# Patient Record
Sex: FEMALE | Race: BLACK OR AFRICAN AMERICAN | NOT HISPANIC OR LATINO | Employment: FULL TIME | URBAN - METROPOLITAN AREA
[De-identification: names, ages, dates, MRNs, and addresses within clinical notes are randomized per-mention and may not be internally consistent; named-entity substitution may affect disease eponyms.]

---

## 2018-07-24 ENCOUNTER — OFFICE VISIT (OUTPATIENT)
Dept: UROLOGY | Facility: CLINIC | Age: 58
End: 2018-07-24
Payer: COMMERCIAL

## 2018-07-24 VITALS — HEIGHT: 65 IN | RESPIRATION RATE: 19 BRPM | OXYGEN SATURATION: 99 % | WEIGHT: 281 LBS | BODY MASS INDEX: 46.82 KG/M2

## 2018-07-24 DIAGNOSIS — N23 RENAL COLIC: Primary | ICD-10-CM

## 2018-07-24 DIAGNOSIS — N20.0 NEPHROLITHIASIS: ICD-10-CM

## 2018-07-24 DIAGNOSIS — D17.79 MYELOLIPOMA OF RIGHT ADRENAL GLAND: ICD-10-CM

## 2018-07-24 DIAGNOSIS — D17.79 MYELOLIPOMA OF LEFT ADRENAL GLAND: ICD-10-CM

## 2018-07-24 PROCEDURE — 3008F BODY MASS INDEX DOCD: CPT | Mod: CPTII,S$GLB,, | Performed by: UROLOGY

## 2018-07-24 PROCEDURE — 99214 OFFICE O/P EST MOD 30 MIN: CPT | Mod: S$GLB,,, | Performed by: UROLOGY

## 2018-07-24 PROCEDURE — 99999 PR PBB SHADOW E&M-EST. PATIENT-LVL III: CPT | Mod: PBBFAC,,, | Performed by: UROLOGY

## 2018-07-24 RX ORDER — VALACYCLOVIR HYDROCHLORIDE 1 G/1
1000 TABLET, FILM COATED ORAL DAILY
COMMUNITY
Start: 2018-07-24 | End: 2024-03-26

## 2018-07-24 RX ORDER — METFORMIN HYDROCHLORIDE 500 MG/1
1000 TABLET, EXTENDED RELEASE ORAL DAILY
COMMUNITY
Start: 2018-07-10 | End: 2020-10-19 | Stop reason: SDUPTHER

## 2018-07-24 NOTE — PATIENT INSTRUCTIONS
Obtain XRAY.  If stone is found and amenable to ESWL, then Plan ESWL.   If KUB is normal than plan CT Scan

## 2018-07-24 NOTE — PROGRESS NOTES
Subjective:       Patient ID: Aminta Schreiber is a 58 y.o. female.    Chief Complaint: Follow-up    57 yo AAF with history of Nephrolithiasis. Developed Right flank pain 2 mo ago that was severe and it has been waxing and waning since that time. Presents for evaluation. Pt also found to have bilateral myelolipoma in her adrenal glands. Last imaged in 12/17.      Flank Pain   This is a new problem. The current episode started more than 1 month ago. The problem occurs daily. The problem has been waxing and waning since onset. The pain is present in the costovertebral angle. The quality of the pain is described as aching. The pain does not radiate. The pain is at a severity of 3/10. The pain is mild. The pain is worse during the day. The symptoms are aggravated by bending, twisting, standing and position. Pertinent negatives include no abdominal pain, bladder incontinence, bowel incontinence, chest pain, dysuria, fever, headaches, leg pain, numbness, paresis, paresthesias, pelvic pain, perianal numbness, tingling, weakness or weight loss. Risk factors include renal stones, lack of exercise, obesity and sedentary lifestyle. She has tried nothing for the symptoms. The treatment provided significant relief.     Review of Systems   Constitutional: Negative for activity change, appetite change, chills, fatigue, fever and weight loss.   HENT: Negative for congestion, ear pain, hearing loss, nosebleeds, sinus pressure, sore throat and trouble swallowing.    Eyes: Negative for pain and visual disturbance.   Respiratory: Negative for apnea, cough and shortness of breath.    Cardiovascular: Negative for chest pain and leg swelling.   Gastrointestinal: Negative for abdominal distention, abdominal pain, anal bleeding, blood in stool, bowel incontinence, constipation, diarrhea, nausea, rectal pain and vomiting.   Endocrine: Negative for cold intolerance, heat intolerance, polydipsia, polyphagia and polyuria.   Genitourinary: Positive for  flank pain. Negative for bladder incontinence, decreased urine volume, difficulty urinating, dyspareunia, dysuria, enuresis, frequency, genital sores, hematuria, menstrual problem, pelvic pain, urgency, vaginal bleeding, vaginal discharge and vaginal pain.   Musculoskeletal: Negative for arthralgias and back pain.   Skin: Negative for color change, pallor and rash.   Allergic/Immunologic: Negative for environmental allergies, food allergies and immunocompromised state.   Neurological: Negative for dizziness, tingling, speech difficulty, weakness, numbness, headaches and paresthesias.   Hematological: Negative for adenopathy. Does not bruise/bleed easily.   Psychiatric/Behavioral: Negative.        Objective:      Physical Exam   Nursing note and vitals reviewed.  Constitutional: She is oriented to person, place, and time. She appears well-developed and well-nourished.   HENT:   Head: Normocephalic.   Nose: Nose normal.   Mouth/Throat: Oropharynx is clear and moist.   Eyes: Conjunctivae and EOM are normal. Pupils are equal, round, and reactive to light.   Neck: Normal range of motion. Neck supple.   Cardiovascular: Normal rate, regular rhythm, normal heart sounds and intact distal pulses.    Pulmonary/Chest: Effort normal and breath sounds normal.   Abdominal: Soft. Bowel sounds are normal.   Genitourinary: Vagina normal.   Genitourinary Comments: Right CVAT   Musculoskeletal: Normal range of motion.   Neurological: She is alert and oriented to person, place, and time. She has normal reflexes.   Skin: Skin is warm and dry.     Psychiatric: She has a normal mood and affect. Her behavior is normal. Judgment and thought content normal.       Assessment:       1. Renal colic    2. Myelolipoma of left adrenal gland    3. Myelolipoma of right adrenal gland    4. Nephrolithiasis        Plan:       Patient Instructions   Obtain XRAY.  If stone is found and amenable to ESWL, then Plan ESWL.   If KUB is normal than plan CT  Scan

## 2018-07-26 ENCOUNTER — PATIENT MESSAGE (OUTPATIENT)
Dept: UROLOGY | Facility: CLINIC | Age: 58
End: 2018-07-26

## 2018-07-27 DIAGNOSIS — N20.0 RENAL CALCULUS, RIGHT: Primary | ICD-10-CM

## 2018-07-27 RX ORDER — SODIUM CHLORIDE 9 MG/ML
INJECTION, SOLUTION INTRAVENOUS CONTINUOUS
Status: CANCELLED | OUTPATIENT
Start: 2018-07-27

## 2018-07-27 RX ORDER — CIPROFLOXACIN 2 MG/ML
400 INJECTION, SOLUTION INTRAVENOUS
Status: CANCELLED | OUTPATIENT
Start: 2018-07-27

## 2018-08-01 ENCOUNTER — TELEPHONE (OUTPATIENT)
Dept: UROLOGY | Facility: CLINIC | Age: 58
End: 2018-08-01

## 2018-08-01 RX ORDER — OXYCODONE AND ACETAMINOPHEN 10; 325 MG/1; MG/1
1 TABLET ORAL EVERY 6 HOURS PRN
Qty: 28 TABLET | Refills: 0 | Status: SHIPPED | OUTPATIENT
Start: 2018-08-01 | End: 2018-08-08

## 2018-08-01 NOTE — TELEPHONE ENCOUNTER
----- Message from Dominga Abel sent at 8/1/2018  3:52 PM CDT -----  Contact: [phone]/self  Patient requesting to speak with you regarding medication. Please advise.

## 2018-08-06 ENCOUNTER — TELEPHONE (OUTPATIENT)
Dept: UROLOGY | Facility: CLINIC | Age: 58
End: 2018-08-06

## 2018-08-06 NOTE — TELEPHONE ENCOUNTER
----- Message from Megan Webb sent at 8/6/2018  3:24 PM CDT -----  Contact: [place] ([facility]) [phone]  [place] would like to speak with you about the patient's urine culture results. Please advise

## 2018-08-07 ENCOUNTER — CLINICAL SUPPORT (OUTPATIENT)
Dept: UROLOGY | Facility: CLINIC | Age: 58
End: 2018-08-07
Payer: COMMERCIAL

## 2018-08-07 VITALS — BODY MASS INDEX: 45.98 KG/M2 | WEIGHT: 276 LBS | HEIGHT: 65 IN

## 2018-08-07 DIAGNOSIS — N39.0 URINARY TRACT INFECTION WITHOUT HEMATURIA, SITE UNSPECIFIED: Primary | ICD-10-CM

## 2018-08-07 PROCEDURE — 87086 URINE CULTURE/COLONY COUNT: CPT

## 2018-08-07 PROCEDURE — 99999 PR PBB SHADOW E&M-EST. PATIENT-LVL I: CPT | Mod: PBBFAC,,,

## 2018-08-08 LAB
BACTERIA UR CULT: NORMAL
BACTERIA UR CULT: NORMAL

## 2018-08-09 DIAGNOSIS — N20.0 KIDNEY STONE: Primary | ICD-10-CM

## 2018-08-14 ENCOUNTER — PATIENT MESSAGE (OUTPATIENT)
Dept: UROLOGY | Facility: CLINIC | Age: 58
End: 2018-08-14

## 2018-08-20 ENCOUNTER — PATIENT MESSAGE (OUTPATIENT)
Dept: UROLOGY | Facility: CLINIC | Age: 58
End: 2018-08-20

## 2018-08-21 DIAGNOSIS — N20.0 KIDNEY STONE: Primary | ICD-10-CM

## 2018-08-23 ENCOUNTER — PATIENT MESSAGE (OUTPATIENT)
Dept: UROLOGY | Facility: CLINIC | Age: 58
End: 2018-08-23

## 2018-08-25 ENCOUNTER — PATIENT MESSAGE (OUTPATIENT)
Dept: UROLOGY | Facility: CLINIC | Age: 58
End: 2018-08-25

## 2018-08-29 ENCOUNTER — OFFICE VISIT (OUTPATIENT)
Dept: UROLOGY | Facility: CLINIC | Age: 58
End: 2018-08-29
Payer: COMMERCIAL

## 2018-08-29 VITALS — WEIGHT: 276 LBS | BODY MASS INDEX: 45.98 KG/M2 | HEIGHT: 65 IN

## 2018-08-29 DIAGNOSIS — N23 RENAL COLIC: ICD-10-CM

## 2018-08-29 DIAGNOSIS — Z98.890 POST-OPERATIVE STATE: Primary | ICD-10-CM

## 2018-08-29 DIAGNOSIS — N32.81 OVERACTIVE BLADDER: ICD-10-CM

## 2018-08-29 DIAGNOSIS — R35.0 URINARY FREQUENCY: ICD-10-CM

## 2018-08-29 DIAGNOSIS — N20.0 RENAL CALCULUS, RIGHT: ICD-10-CM

## 2018-08-29 PROCEDURE — 99024 POSTOP FOLLOW-UP VISIT: CPT | Mod: S$GLB,,, | Performed by: UROLOGY

## 2018-08-29 PROCEDURE — 99999 PR PBB SHADOW E&M-EST. PATIENT-LVL III: CPT | Mod: PBBFAC,,, | Performed by: UROLOGY

## 2018-08-29 RX ORDER — ROSUVASTATIN CALCIUM 10 MG/1
TABLET, COATED ORAL
COMMUNITY
Start: 2017-03-13 | End: 2021-03-29 | Stop reason: SDUPTHER

## 2018-08-29 RX ORDER — LOSARTAN POTASSIUM AND HYDROCHLOROTHIAZIDE 12.5; 5 MG/1; MG/1
TABLET ORAL
COMMUNITY
Start: 2017-03-13 | End: 2021-02-26 | Stop reason: SDUPTHER

## 2018-08-29 RX ORDER — OXYCODONE AND ACETAMINOPHEN 5; 325 MG/1; MG/1
TABLET ORAL
COMMUNITY
End: 2019-04-29

## 2018-08-29 NOTE — PROGRESS NOTES
"Aminta Schreiber is a 58 y.o. female patient.   1. Post-operative state    2. Renal colic    3. Renal calculus, right    4. Urinary frequency    5. Overactive bladder      Past Medical History:   Diagnosis Date    Diabetes mellitus, type 2     GERD (gastroesophageal reflux disease)     H/O left breast biopsy     Hyperlipidemia     Hypertension     Kidney stone     Migraines     Urinary tract infection     Vaginal infection      Past Surgical History Pertinent Negatives:   Procedure Date Noted    CYSTOSCOPY 07/24/2018     Scheduled Meds:  Continuous Infusions:  PRN Meds:    Review of patient's allergies indicates:  No Known Allergies  There are no hospital problems to display for this patient.    Height 5' 5" (1.651 m), weight 125.2 kg (276 lb).    Subjective:   Diet: Adequate intake.  Patient reports no nausea or vomiting.    Activity level: Normal.    Pain control: Well controlled.      Objective:  Vital signs (most recent): Height 5' 5" (1.651 m), weight 125.2 kg (276 lb).  General appearance: Comfortable, well-appearing, in no acute distress and not in pain.    Lungs:  Normal respiratory rate and normal effort.  Breath sounds normal.    Heart: Normal rate.  Regular rhythm.  S1 normal.    Chest: Symmetric chest wall expansion.    Abdomen: Abdomen is soft.    Bowel sounds:  Bowel sounds are normal.    Tenderness: There is no abdominal tenderness tenderness.    Extremities: There is normal range of motion.    Neurological: Pupils are equal, round, and reactive to light.       Assessment & Plan       Milton Garland MD  8/29/2018  "

## 2018-08-29 NOTE — PATIENT INSTRUCTIONS
Continue Toradol while working or driving  Hydrocodone when at home as needed.  Strain urine  F/U 3 weeks with KUB  Myrbetriq

## 2018-09-21 ENCOUNTER — OFFICE VISIT (OUTPATIENT)
Dept: UROLOGY | Facility: CLINIC | Age: 58
End: 2018-09-21
Payer: COMMERCIAL

## 2018-09-21 VITALS — HEIGHT: 65 IN | BODY MASS INDEX: 45.98 KG/M2 | WEIGHT: 276 LBS

## 2018-09-21 DIAGNOSIS — Z53.20 PROCEDURE NOT CARRIED OUT BECAUSE OF PATIENT'S DECISION: Primary | ICD-10-CM

## 2018-09-21 PROCEDURE — 99499 UNLISTED E&M SERVICE: CPT | Mod: S$GLB,,, | Performed by: UROLOGY

## 2018-09-21 PROCEDURE — 99999 PR PBB SHADOW E&M-EST. PATIENT-LVL III: CPT | Mod: PBBFAC,,, | Performed by: UROLOGY

## 2018-09-21 RX ORDER — KETOROLAC TROMETHAMINE 10 MG/1
TABLET, FILM COATED ORAL
COMMUNITY
Start: 2018-08-30 | End: 2019-04-30 | Stop reason: ALTCHOICE

## 2018-09-24 ENCOUNTER — OFFICE VISIT (OUTPATIENT)
Dept: UROLOGY | Facility: CLINIC | Age: 58
End: 2018-09-24
Payer: COMMERCIAL

## 2018-09-24 VITALS
HEART RATE: 73 BPM | SYSTOLIC BLOOD PRESSURE: 117 MMHG | WEIGHT: 276 LBS | HEIGHT: 65 IN | OXYGEN SATURATION: 98 % | DIASTOLIC BLOOD PRESSURE: 65 MMHG | RESPIRATION RATE: 19 BRPM | BODY MASS INDEX: 45.98 KG/M2

## 2018-09-24 DIAGNOSIS — N20.0 RENAL CALCULUS, RIGHT: ICD-10-CM

## 2018-09-24 DIAGNOSIS — Z98.890 POST-OPERATIVE STATE: Primary | ICD-10-CM

## 2018-09-24 DIAGNOSIS — N23 RENAL COLIC: ICD-10-CM

## 2018-09-24 PROCEDURE — 99024 POSTOP FOLLOW-UP VISIT: CPT | Mod: S$GLB,,, | Performed by: NURSE PRACTITIONER

## 2018-09-24 PROCEDURE — 99999 PR PBB SHADOW E&M-EST. PATIENT-LVL V: CPT | Mod: PBBFAC,,, | Performed by: NURSE PRACTITIONER

## 2018-09-24 NOTE — PROGRESS NOTES
Subjective:       Patient ID: Aminta Schreiber is a 58 y.o. female.    Chief Complaint: Post-op Evaluation    Patient is new to me. She is a 57 yo AAF who is here today for post-op evaluation. She is S/P right lithotripsy on 8/9/2018 by Dr. Garland. Patient's post-op KUB was done last week on 9/17/2018. Results discussed with patient. She reports her right flank pain has greatly improved. Denies pain at this time or passing any stone particles. She is currently taking mirabegron 25 mg daily and tamsulosin 0.4 mg daily.       Other   Chronicity: S/P right lithotripsy. Episode onset: 8/9/2018. Associated symptoms include fatigue, headaches and nausea. Pertinent negatives include no abdominal pain, anorexia, arthralgias, change in bowel habit, chest pain, chills, congestion, coughing, diaphoresis, fever, joint swelling, myalgias, neck pain, numbness, rash, sore throat, swollen glands, urinary symptoms, vertigo, visual change, vomiting or weakness. Nothing aggravates the symptoms. She has tried NSAIDs for the symptoms. The treatment provided moderate relief.     Review of Systems   Constitutional: Positive for fatigue. Negative for appetite change, chills, diaphoresis and fever.   HENT: Negative for congestion and sore throat.    Respiratory: Negative for cough.    Cardiovascular: Negative for chest pain.   Gastrointestinal: Positive for constipation (taking colace) and nausea. Negative for abdominal pain, anorexia, change in bowel habit, diarrhea and vomiting.   Genitourinary: Positive for flank pain. Negative for decreased urine volume, difficulty urinating, dysuria, frequency, hematuria, urgency, vaginal bleeding, vaginal discharge and vaginal pain. Genital sores: right side sometimes.        Urinary stress and urge incontinence.   Musculoskeletal: Negative for arthralgias, joint swelling, myalgias and neck pain.   Skin: Negative for rash.   Neurological: Positive for headaches. Negative for dizziness, vertigo, weakness and  numbness.   Psychiatric/Behavioral: Negative.        Objective:      Physical Exam   Constitutional: She is oriented to person, place, and time. She appears well-developed and well-nourished.   HENT:   Head: Normocephalic and atraumatic.   Eyes: EOM are normal. Pupils are equal, round, and reactive to light.   Neck: Normal range of motion.   Cardiovascular: Normal rate.   Pulmonary/Chest: Effort normal. No respiratory distress.   Abdominal: Soft. There is no tenderness.   Musculoskeletal: Normal range of motion.   No bilateral CVAT   Neurological: She is alert and oriented to person, place, and time. Coordination normal.   Skin: Skin is warm and dry.   Psychiatric: She has a normal mood and affect. Her behavior is normal. Judgment and thought content normal.   Nursing note and vitals reviewed.      Assessment:       1. Post-operative state    2. Renal calculus, right    3. Renal colic        Plan:       Aminta was seen today for post-op evaluation.    Diagnoses and all orders for this visit:    Post-operative state    Renal calculus, right    Renal colic    Other orders  1. Continue mirabegron 25 mg for urinary leakage  2. Continue tamsulosin 0.4 mg until done.     Follow-up in 1 year and as needed.     Kendra Batista NP

## 2018-09-24 NOTE — PATIENT INSTRUCTIONS
Continue mirabegron 25 mg for urinary leakage  Continue tamsulosin 0.4 mg until done.   Follow-up in 1 year and as needed.

## 2018-09-24 NOTE — PROGRESS NOTES
The patient left the office before the visit was finished.  The patient left the office before the visit was finished.

## 2018-09-25 RX ORDER — TAMSULOSIN HYDROCHLORIDE 0.4 MG/1
0.4 CAPSULE ORAL NIGHTLY
Qty: 30 CAPSULE | Refills: 1 | Status: SHIPPED | OUTPATIENT
Start: 2018-09-25 | End: 2019-04-30

## 2019-01-18 ENCOUNTER — TELEPHONE (OUTPATIENT)
Dept: UROLOGY | Facility: CLINIC | Age: 59
End: 2019-01-18

## 2019-01-21 ENCOUNTER — OFFICE VISIT (OUTPATIENT)
Dept: UROLOGY | Facility: CLINIC | Age: 59
End: 2019-01-21
Payer: COMMERCIAL

## 2019-01-21 VITALS
HEIGHT: 65 IN | SYSTOLIC BLOOD PRESSURE: 128 MMHG | BODY MASS INDEX: 47.83 KG/M2 | WEIGHT: 287.06 LBS | DIASTOLIC BLOOD PRESSURE: 76 MMHG | HEART RATE: 80 BPM

## 2019-01-21 DIAGNOSIS — N32.81 OVERACTIVE BLADDER: Primary | ICD-10-CM

## 2019-01-21 DIAGNOSIS — R35.1 NOCTURIA: ICD-10-CM

## 2019-01-21 PROCEDURE — 3008F BODY MASS INDEX DOCD: CPT | Mod: CPTII,S$GLB,, | Performed by: NURSE PRACTITIONER

## 2019-01-21 PROCEDURE — 99999 PR PBB SHADOW E&M-EST. PATIENT-LVL IV: CPT | Mod: PBBFAC,,, | Performed by: NURSE PRACTITIONER

## 2019-01-21 PROCEDURE — 3078F PR MOST RECENT DIASTOLIC BLOOD PRESSURE < 80 MM HG: ICD-10-PCS | Mod: CPTII,S$GLB,, | Performed by: NURSE PRACTITIONER

## 2019-01-21 PROCEDURE — 99999 PR PBB SHADOW E&M-EST. PATIENT-LVL IV: ICD-10-PCS | Mod: PBBFAC,,, | Performed by: NURSE PRACTITIONER

## 2019-01-21 PROCEDURE — 3078F DIAST BP <80 MM HG: CPT | Mod: CPTII,S$GLB,, | Performed by: NURSE PRACTITIONER

## 2019-01-21 PROCEDURE — 99213 PR OFFICE/OUTPT VISIT, EST, LEVL III, 20-29 MIN: ICD-10-PCS | Mod: S$GLB,,, | Performed by: NURSE PRACTITIONER

## 2019-01-21 PROCEDURE — 3074F PR MOST RECENT SYSTOLIC BLOOD PRESSURE < 130 MM HG: ICD-10-PCS | Mod: CPTII,S$GLB,, | Performed by: NURSE PRACTITIONER

## 2019-01-21 PROCEDURE — 3008F PR BODY MASS INDEX (BMI) DOCUMENTED: ICD-10-PCS | Mod: CPTII,S$GLB,, | Performed by: NURSE PRACTITIONER

## 2019-01-21 PROCEDURE — 99213 OFFICE O/P EST LOW 20 MIN: CPT | Mod: S$GLB,,, | Performed by: NURSE PRACTITIONER

## 2019-01-21 PROCEDURE — 3074F SYST BP LT 130 MM HG: CPT | Mod: CPTII,S$GLB,, | Performed by: NURSE PRACTITIONER

## 2019-01-21 RX ORDER — ALBUTEROL SULFATE 90 UG/1
AEROSOL, METERED RESPIRATORY (INHALATION)
Refills: 0 | COMMUNITY
Start: 2019-01-19 | End: 2019-04-30

## 2019-01-21 RX ORDER — AMOXICILLIN AND CLAVULANATE POTASSIUM 875; 125 MG/1; MG/1
1 TABLET, FILM COATED ORAL 2 TIMES DAILY
Refills: 0 | COMMUNITY
Start: 2019-01-19 | End: 2019-04-30 | Stop reason: ALTCHOICE

## 2019-01-21 RX ORDER — BENZONATATE 100 MG/1
100 CAPSULE ORAL 3 TIMES DAILY
Refills: 0 | COMMUNITY
Start: 2019-01-19 | End: 2019-04-30 | Stop reason: ALTCHOICE

## 2019-01-21 RX ORDER — TROSPIUM CHLORIDE 20 MG/1
20 TABLET, FILM COATED ORAL 2 TIMES DAILY
Qty: 60 TABLET | Refills: 1 | Status: SHIPPED | OUTPATIENT
Start: 2019-01-21 | End: 2019-03-22 | Stop reason: SDUPTHER

## 2019-01-21 NOTE — PROGRESS NOTES
Subjective:       Patient ID: Aminta Schreiber is a 58 y.o. female.    Chief Complaint: Medication Refill    Patient is here today for an alternative medication for her OAB. She was taking mirabegron, but it's no longer covered by her insurance company and she can not afford the medication.       Medication Refill   Chronicity: OAB. Episode onset: Approximately 5 months. The problem occurs daily. The problem has been gradually improving. Associated symptoms include chills, congestion, coughing, fatigue, headaches and urinary symptoms. Pertinent negatives include no abdominal pain, change in bowel habit, fever, nausea, swollen glands or vomiting. Nothing aggravates the symptoms. Treatments tried: Mirabegron. The treatment provided significant relief.     Review of Systems   Constitutional: Positive for chills and fatigue. Negative for appetite change and fever.   HENT: Positive for congestion.    Respiratory: Positive for cough.    Gastrointestinal: Negative for abdominal pain, change in bowel habit, constipation, diarrhea, nausea and vomiting.   Genitourinary: Positive for urgency. Negative for decreased urine volume, difficulty urinating, dysuria, flank pain, frequency, hematuria, vaginal bleeding, vaginal discharge and vaginal pain.        Nocturia x2-3  Urinary urge incontinence   Neurological: Positive for headaches. Negative for dizziness.   Psychiatric/Behavioral: Negative.        Objective:      Physical Exam   Constitutional: She is oriented to person, place, and time. No distress.   Morbid obese   HENT:   Head: Normocephalic and atraumatic.   Eyes: EOM are normal. Pupils are equal, round, and reactive to light.   Neck: Normal range of motion.   Cardiovascular: Normal rate.   Pulmonary/Chest: Effort normal. No respiratory distress.   Abdominal: Soft. There is no tenderness.   Musculoskeletal: Normal range of motion.   Neurological: She is alert and oriented to person, place, and time. Coordination normal.   Skin:  Skin is warm and dry.   Psychiatric: She has a normal mood and affect. Her behavior is normal. Judgment and thought content normal.   Nursing note and vitals reviewed.      Assessment:       1. Overactive bladder    2. Nocturia        Plan:       Aminta was seen today for medication refill.    Diagnoses and all orders for this visit:    Overactive bladder  -     trospium (SANCTURA) 20 mg Tab tablet; Take 1 tablet (20 mg total) by mouth 2 (two) times daily.    Follow-up in 6 weeks and as needed.    Kendra Batista NP

## 2019-01-22 ENCOUNTER — PATIENT MESSAGE (OUTPATIENT)
Dept: UROLOGY | Facility: CLINIC | Age: 59
End: 2019-01-22

## 2019-02-10 ENCOUNTER — PATIENT MESSAGE (OUTPATIENT)
Dept: UROLOGY | Facility: CLINIC | Age: 59
End: 2019-02-10

## 2019-03-22 ENCOUNTER — PATIENT MESSAGE (OUTPATIENT)
Dept: UROLOGY | Facility: CLINIC | Age: 59
End: 2019-03-22

## 2019-03-22 DIAGNOSIS — N32.81 OVERACTIVE BLADDER: ICD-10-CM

## 2019-03-22 RX ORDER — TROSPIUM CHLORIDE 20 MG/1
20 TABLET, FILM COATED ORAL 2 TIMES DAILY
Qty: 60 TABLET | Refills: 5 | Status: SHIPPED | OUTPATIENT
Start: 2019-03-22 | End: 2019-03-25 | Stop reason: SDUPTHER

## 2019-03-25 DIAGNOSIS — N32.81 OVERACTIVE BLADDER: ICD-10-CM

## 2019-03-25 RX ORDER — TROSPIUM CHLORIDE 20 MG/1
20 TABLET, FILM COATED ORAL 2 TIMES DAILY
Qty: 180 TABLET | Refills: 1 | Status: SHIPPED | OUTPATIENT
Start: 2019-03-25 | End: 2020-04-13

## 2019-04-08 LAB
LEFT EYE DM RETINOPATHY: NEGATIVE
RIGHT EYE DM RETINOPATHY: NEGATIVE

## 2019-04-28 ENCOUNTER — PATIENT MESSAGE (OUTPATIENT)
Dept: UROLOGY | Facility: CLINIC | Age: 59
End: 2019-04-28

## 2019-04-29 ENCOUNTER — PATIENT MESSAGE (OUTPATIENT)
Dept: UROLOGY | Facility: CLINIC | Age: 59
End: 2019-04-29

## 2019-04-30 ENCOUNTER — OFFICE VISIT (OUTPATIENT)
Dept: UROLOGY | Facility: CLINIC | Age: 59
End: 2019-04-30
Payer: COMMERCIAL

## 2019-04-30 VITALS — HEIGHT: 65 IN | WEIGHT: 272.25 LBS | BODY MASS INDEX: 45.36 KG/M2

## 2019-04-30 DIAGNOSIS — N20.0 KIDNEY STONE: Primary | ICD-10-CM

## 2019-04-30 DIAGNOSIS — N20.1 RIGHT URETERAL CALCULUS: ICD-10-CM

## 2019-04-30 DIAGNOSIS — R31.0 GROSS HEMATURIA: ICD-10-CM

## 2019-04-30 DIAGNOSIS — R11.2 NON-INTRACTABLE VOMITING WITH NAUSEA, UNSPECIFIED VOMITING TYPE: ICD-10-CM

## 2019-04-30 PROCEDURE — 3008F PR BODY MASS INDEX (BMI) DOCUMENTED: ICD-10-PCS | Mod: CPTII,S$GLB,, | Performed by: UROLOGY

## 2019-04-30 PROCEDURE — 99215 OFFICE O/P EST HI 40 MIN: CPT | Mod: S$GLB,,, | Performed by: UROLOGY

## 2019-04-30 PROCEDURE — 99215 PR OFFICE/OUTPT VISIT, EST, LEVL V, 40-54 MIN: ICD-10-PCS | Mod: S$GLB,,, | Performed by: UROLOGY

## 2019-04-30 PROCEDURE — 99999 PR PBB SHADOW E&M-EST. PATIENT-LVL II: ICD-10-PCS | Mod: PBBFAC,,, | Performed by: UROLOGY

## 2019-04-30 PROCEDURE — 99999 PR PBB SHADOW E&M-EST. PATIENT-LVL II: CPT | Mod: PBBFAC,,, | Performed by: UROLOGY

## 2019-04-30 PROCEDURE — 3008F BODY MASS INDEX DOCD: CPT | Mod: CPTII,S$GLB,, | Performed by: UROLOGY

## 2019-04-30 RX ORDER — SODIUM CHLORIDE 9 MG/ML
INJECTION, SOLUTION INTRAVENOUS CONTINUOUS
Status: CANCELLED | OUTPATIENT
Start: 2019-04-30

## 2019-04-30 RX ORDER — CIPROFLOXACIN 2 MG/ML
200 INJECTION, SOLUTION INTRAVENOUS
Status: CANCELLED | OUTPATIENT
Start: 2019-04-30

## 2019-04-30 NOTE — PROGRESS NOTES
Subjective:       Patient ID: Aminta Schreiber is a 58 y.o. female.    Chief Complaint: Nephrolithiasis    57 yo AAF was on cruise and returned 2 days ago with Right Flank Pain. Went to ED  With N, V and flank pain. CT showed Right Hydroneprosis with 7x7 mm distal ureteral calculus. And an 8x7 mm RLP non-obstructing renal calculus. Here requesting Tx.  Flank Pain   This is a new problem. The current episode started in the past 7 days. The problem occurs constantly. The problem is unchanged. The pain is present in the costovertebral angle. The quality of the pain is described as stabbing and aching. The pain does not radiate. The pain is at a severity of 9/10. The pain is severe. The pain is the same all the time. Pertinent negatives include no abdominal pain, bladder incontinence, bowel incontinence, chest pain, dysuria, fever, headaches, leg pain, numbness, paresis, paresthesias, pelvic pain, perianal numbness, tingling, weakness or weight loss. Risk factors include renal stones, sedentary lifestyle, lack of exercise and obesity. She has tried nothing for the symptoms. The treatment provided significant relief.     Review of Systems   Constitutional: Negative for activity change, appetite change, chills, fatigue, fever and weight loss.   HENT: Negative for congestion, ear pain, hearing loss, nosebleeds, sinus pressure, sore throat and trouble swallowing.    Eyes: Negative for pain and visual disturbance.   Respiratory: Negative for apnea, cough and shortness of breath.    Cardiovascular: Negative for chest pain and leg swelling.   Gastrointestinal: Positive for nausea and vomiting. Negative for abdominal distention, abdominal pain, anal bleeding, blood in stool, bowel incontinence, constipation, diarrhea and rectal pain.   Endocrine: Negative for cold intolerance, heat intolerance, polydipsia, polyphagia and polyuria.   Genitourinary: Positive for flank pain and hematuria. Negative for bladder incontinence, decreased urine  volume, difficulty urinating, dyspareunia, dysuria, enuresis, frequency, genital sores, menstrual problem, pelvic pain, urgency, vaginal bleeding, vaginal discharge and vaginal pain.   Musculoskeletal: Negative for arthralgias and back pain.   Skin: Negative for color change, pallor and rash.   Allergic/Immunologic: Negative for environmental allergies, food allergies and immunocompromised state.   Neurological: Negative for dizziness, tingling, speech difficulty, weakness, numbness, headaches and paresthesias.   Hematological: Negative for adenopathy. Does not bruise/bleed easily.   Psychiatric/Behavioral: Negative.        Objective:      Physical Exam   Nursing note and vitals reviewed.  Constitutional: She is oriented to person, place, and time. She appears well-developed and well-nourished.   HENT:   Head: Normocephalic.   Nose: Nose normal.   Mouth/Throat: Oropharynx is clear and moist.   Eyes: Conjunctivae and EOM are normal. Pupils are equal, round, and reactive to light.   Neck: Normal range of motion. Neck supple.   Cardiovascular: Normal rate, regular rhythm, normal heart sounds and intact distal pulses.    Pulmonary/Chest: Effort normal and breath sounds normal.   Abdominal: Soft. Bowel sounds are normal.   Genitourinary: Vagina normal.   Genitourinary Comments: Right CVAT   Musculoskeletal: Normal range of motion.   Neurological: She is alert and oriented to person, place, and time. She has normal reflexes.   Skin: Skin is warm and dry.     Psychiatric: She has a normal mood and affect. Her behavior is normal. Judgment and thought content normal.       Assessment:       1. Right ureteral calculus    2. Gross hematuria    3. Non-intractable vomiting with nausea, unspecified vomiting type        Plan:       Patient Instructions   Plan Right distal ureteral ESWL 5/2/19 at [facility] at 7 am

## 2019-05-02 PROBLEM — N20.0 KIDNEY STONE: Status: ACTIVE | Noted: 2019-05-02

## 2019-05-22 ENCOUNTER — OFFICE VISIT (OUTPATIENT)
Dept: UROLOGY | Facility: CLINIC | Age: 59
End: 2019-05-22
Payer: COMMERCIAL

## 2019-05-22 VITALS
HEART RATE: 99 BPM | BODY MASS INDEX: 46.82 KG/M2 | SYSTOLIC BLOOD PRESSURE: 135 MMHG | DIASTOLIC BLOOD PRESSURE: 63 MMHG | HEIGHT: 65 IN | RESPIRATION RATE: 18 BRPM | OXYGEN SATURATION: 98 % | WEIGHT: 281 LBS

## 2019-05-22 DIAGNOSIS — N20.0 RENAL CALCULUS, RIGHT: ICD-10-CM

## 2019-05-22 DIAGNOSIS — Z98.890 POST-OPERATIVE STATE: Primary | ICD-10-CM

## 2019-05-22 DIAGNOSIS — R30.0 DYSURIA: ICD-10-CM

## 2019-05-22 DIAGNOSIS — N23 RENAL COLIC ON RIGHT SIDE: ICD-10-CM

## 2019-05-22 DIAGNOSIS — R39.15 URINARY URGENCY: ICD-10-CM

## 2019-05-22 PROCEDURE — 99024 POSTOP FOLLOW-UP VISIT: CPT | Mod: S$GLB,,, | Performed by: NURSE PRACTITIONER

## 2019-05-22 PROCEDURE — 99999 PR PBB SHADOW E&M-EST. PATIENT-LVL V: ICD-10-PCS | Mod: PBBFAC,,, | Performed by: NURSE PRACTITIONER

## 2019-05-22 PROCEDURE — 99024 PR POST-OP FOLLOW-UP VISIT: ICD-10-PCS | Mod: S$GLB,,, | Performed by: NURSE PRACTITIONER

## 2019-05-22 PROCEDURE — 99999 PR PBB SHADOW E&M-EST. PATIENT-LVL V: CPT | Mod: PBBFAC,,, | Performed by: NURSE PRACTITIONER

## 2019-05-22 RX ORDER — ERGOCALCIFEROL 1.25 MG/1
CAPSULE ORAL
Refills: 0 | COMMUNITY
Start: 2019-05-03 | End: 2020-10-19

## 2019-05-22 NOTE — PATIENT INSTRUCTIONS
1. U/A and urine cx (home collect; will bring specimen to clinic).  2. Follow-up in 4 weeks and as needed.

## 2019-05-22 NOTE — PROGRESS NOTES
Subjective:       Patient ID: Aminta Schreiber is a 58 y.o. female.    Chief Complaint: Post-op Evaluation    Patient is here today for her post-op evaluation. She is S/P right lithotripsy by Dr. Garland on 5/2/2019. KUB performed on 5/21/19 and showed a 9 mm right lower pole renal calculus.Results discussed with patient. Patient reports she has intermittent right flank pain. She last experienced the pain 2 days ago and rated it a 6/10 at that time. She denies any pain today. Patient is here today with her .    Other   Chronicity: S/P right lithotripsy. Episode onset: 5/2/2019. The problem has been waxing and waning. Associated symptoms include fatigue and urinary symptoms. Pertinent negatives include no abdominal pain, change in bowel habit, chills, diaphoresis, fever, headaches, nausea, swollen glands, vomiting or weakness. Nothing aggravates the symptoms. Treatments tried: right lithotripsy.   Flank Pain   The problem occurs intermittently. The problem has been waxing and waning since onset. The pain is present in the costovertebral angle (right). The quality of the pain is described as aching (sharp). The pain does not radiate. The pain is at a severity of 0/10 (2 days ago pain level was a 6/10). The patient is experiencing no pain. Associated symptoms include dysuria. Pertinent negatives include no abdominal pain, bladder incontinence, bowel incontinence, fever, headaches, pelvic pain, weakness or weight loss. Risk factors include renal stones, sedentary lifestyle, obesity and lack of exercise. She has tried analgesics for the symptoms. The treatment provided moderate relief.     Review of Systems   Constitutional: Positive for fatigue. Negative for appetite change, chills, diaphoresis, fever and weight loss.   Gastrointestinal: Positive for constipation (stool softener). Negative for abdominal pain, bowel incontinence, change in bowel habit, diarrhea, nausea and vomiting.   Genitourinary: Positive for dysuria,  flank pain (right) and urgency. Negative for bladder incontinence, decreased urine volume, difficulty urinating, frequency, hematuria, pelvic pain, vaginal bleeding, vaginal discharge and vaginal pain.   Neurological: Negative for dizziness, weakness and headaches.   Psychiatric/Behavioral: Negative.        Objective:      Physical Exam   Constitutional: She is oriented to person, place, and time. No distress.   Morbid obese   HENT:   Head: Normocephalic and atraumatic.   Eyes: Pupils are equal, round, and reactive to light. EOM are normal.   Neck: Normal range of motion.   Cardiovascular: Normal rate.   Pulmonary/Chest: Effort normal. No respiratory distress.   Abdominal: Soft. There is no tenderness.   Musculoskeletal: Normal range of motion. She exhibits no edema.   Neurological: She is alert and oriented to person, place, and time. Coordination normal.   Skin: Skin is warm and dry.   Psychiatric: She has a normal mood and affect. Her behavior is normal. Judgment and thought content normal.   Nursing note and vitals reviewed.      Assessment:       1. Post-operative state    2. Renal calculus, right    3. Renal colic on right side    4. Urinary urgency    5. Dysuria        Plan:       Aminta was seen today for post-op evaluation.    Diagnoses and all orders for this visit:    Post-operative state    Renal calculus, right    Renal colic on right side    Urinary urgency    Dysuria    Other order  1. U/A and urine cx (home collect; will bring specimen to clinic).    Follow-up in 4 weeks and as needed.     Kendra Batista NP

## 2019-06-17 ENCOUNTER — OFFICE VISIT (OUTPATIENT)
Dept: UROLOGY | Facility: CLINIC | Age: 59
End: 2019-06-17
Payer: COMMERCIAL

## 2019-06-17 VITALS
BODY MASS INDEX: 46.82 KG/M2 | WEIGHT: 281 LBS | SYSTOLIC BLOOD PRESSURE: 126 MMHG | OXYGEN SATURATION: 98 % | HEART RATE: 77 BPM | DIASTOLIC BLOOD PRESSURE: 68 MMHG | RESPIRATION RATE: 18 BRPM | HEIGHT: 65 IN

## 2019-06-17 DIAGNOSIS — Z09 FOLLOW-UP EXAM: Primary | ICD-10-CM

## 2019-06-17 DIAGNOSIS — N20.0 KIDNEY STONE: ICD-10-CM

## 2019-06-17 DIAGNOSIS — R39.15 URINARY URGENCY: ICD-10-CM

## 2019-06-17 LAB
BILIRUB SERPL-MCNC: NORMAL MG/DL
BLOOD URINE, POC: NORMAL
COLOR, POC UA: YELLOW
GLUCOSE UR QL STRIP: NORMAL
KETONES UR QL STRIP: NORMAL
LEUKOCYTE ESTERASE URINE, POC: NORMAL
NITRITE, POC UA: NORMAL
PH, POC UA: 6.5
PROTEIN, POC: NORMAL
SPECIFIC GRAVITY, POC UA: 1.01
UROBILINOGEN, POC UA: 0.2

## 2019-06-17 PROCEDURE — 3074F PR MOST RECENT SYSTOLIC BLOOD PRESSURE < 130 MM HG: ICD-10-PCS | Mod: CPTII,S$GLB,, | Performed by: NURSE PRACTITIONER

## 2019-06-17 PROCEDURE — 3078F PR MOST RECENT DIASTOLIC BLOOD PRESSURE < 80 MM HG: ICD-10-PCS | Mod: CPTII,S$GLB,, | Performed by: NURSE PRACTITIONER

## 2019-06-17 PROCEDURE — 3008F BODY MASS INDEX DOCD: CPT | Mod: CPTII,S$GLB,, | Performed by: NURSE PRACTITIONER

## 2019-06-17 PROCEDURE — 99024 PR POST-OP FOLLOW-UP VISIT: ICD-10-PCS | Mod: S$GLB,,, | Performed by: NURSE PRACTITIONER

## 2019-06-17 PROCEDURE — 3008F PR BODY MASS INDEX (BMI) DOCUMENTED: ICD-10-PCS | Mod: CPTII,S$GLB,, | Performed by: NURSE PRACTITIONER

## 2019-06-17 PROCEDURE — 99024 POSTOP FOLLOW-UP VISIT: CPT | Mod: S$GLB,,, | Performed by: NURSE PRACTITIONER

## 2019-06-17 PROCEDURE — 87086 URINE CULTURE/COLONY COUNT: CPT

## 2019-06-17 PROCEDURE — 81002 URINALYSIS NONAUTO W/O SCOPE: CPT | Mod: S$GLB,,, | Performed by: NURSE PRACTITIONER

## 2019-06-17 PROCEDURE — 99999 PR PBB SHADOW E&M-EST. PATIENT-LVL V: CPT | Mod: PBBFAC,,, | Performed by: NURSE PRACTITIONER

## 2019-06-17 PROCEDURE — 3074F SYST BP LT 130 MM HG: CPT | Mod: CPTII,S$GLB,, | Performed by: NURSE PRACTITIONER

## 2019-06-17 PROCEDURE — 99999 PR PBB SHADOW E&M-EST. PATIENT-LVL V: ICD-10-PCS | Mod: PBBFAC,,, | Performed by: NURSE PRACTITIONER

## 2019-06-17 PROCEDURE — 3078F DIAST BP <80 MM HG: CPT | Mod: CPTII,S$GLB,, | Performed by: NURSE PRACTITIONER

## 2019-06-17 PROCEDURE — 81002 POCT URINE DIPSTICK WITHOUT MICROSCOPE: ICD-10-PCS | Mod: S$GLB,,, | Performed by: NURSE PRACTITIONER

## 2019-06-17 RX ORDER — POTASSIUM CITRATE 10 MEQ/1
10 TABLET, EXTENDED RELEASE ORAL
Qty: 90 TABLET | Refills: 2 | Status: SHIPPED | OUTPATIENT
Start: 2019-06-17 | End: 2019-09-03 | Stop reason: SDUPTHER

## 2019-06-17 NOTE — PATIENT INSTRUCTIONS
Urine dipstick  Urine cx  Start taking Urocit-K  KUB in 2 months (prior to follow-up).  Follow-up in 2 months and as needed.

## 2019-06-17 NOTE — PROGRESS NOTES
Subjective:       Patient ID: Aminta Schreiber is a 59 y.o. female.    Chief Complaint: Nephrolithiasis and Urinary Urgency    Patient is here today for a follow-up for right kidney stone, right flank pain, dysuria, and urgency. She reports all her symptoms have improved except her urinary urgency.     Other   This is a recurrent (Urinary urgency) problem. The current episode started more than 1 month ago. The problem occurs daily. The problem has been unchanged. Associated symptoms include fatigue, nausea and urinary symptoms. Pertinent negatives include no abdominal pain, anorexia, arthralgias, change in bowel habit, chest pain, chills, congestion, coughing, diaphoresis, fever, headaches, numbness, rash, sore throat, swollen glands, vertigo, visual change, vomiting or weakness. Nothing aggravates the symptoms. She has tried nothing for the symptoms.     Review of Systems   Constitutional: Positive for fatigue. Negative for appetite change, chills, diaphoresis and fever.   HENT: Negative for congestion and sore throat.    Respiratory: Negative for cough.    Cardiovascular: Negative for chest pain.   Gastrointestinal: Positive for nausea. Negative for abdominal pain, anorexia, change in bowel habit, constipation, diarrhea and vomiting.   Genitourinary: Positive for urgency. Negative for decreased urine volume, difficulty urinating, dysuria, flank pain, frequency, hematuria, vaginal bleeding, vaginal discharge and vaginal pain.   Musculoskeletal: Negative for arthralgias.   Skin: Negative for rash.   Neurological: Negative for dizziness, vertigo, weakness, numbness and headaches.   Psychiatric/Behavioral: Negative.        Objective:      Physical Exam   Constitutional: She is oriented to person, place, and time. No distress.   Morbidly obese   HENT:   Head: Normocephalic and atraumatic.   Eyes: Pupils are equal, round, and reactive to light. EOM are normal.   Neck: Normal range of motion.   Cardiovascular: Normal rate.    Pulmonary/Chest: Effort normal. No respiratory distress.   Abdominal: Soft. There is no tenderness.   Musculoskeletal: Normal range of motion. She exhibits no edema.   Neurological: She is alert and oriented to person, place, and time. Coordination normal.   Skin: Skin is warm and dry.   Psychiatric: She has a normal mood and affect. Her behavior is normal. Judgment and thought content normal.   Nursing note and vitals reviewed.      Assessment:       1. Follow-up exam    2. Kidney stone    3. Urinary urgency        Plan:       Aminta was seen today for nephrolithiasis and urinary urgency.    Diagnoses and all orders for this visit:    Follow-up exam  -     POCT URINE DIPSTICK WITHOUT MICROSCOPE  -     Urine culture    Kidney stone  -     POCT URINE DIPSTICK WITHOUT MICROSCOPE  -     Urine culture  -     X-Ray Abdomen AP 1 View; Future  -     potassium citrate (UROCIT-K) 10 mEq (1,080 mg) TbSR; Take 1 tablet (10 mEq total) by mouth 3 (three) times daily with meals.    Urinary urgency  -     POCT URINE DIPSTICK WITHOUT MICROSCOPE  -     Urine culture    Follow-up in 2 months with KUB.     Kendra Batista NP

## 2019-06-19 ENCOUNTER — TELEPHONE (OUTPATIENT)
Dept: UROLOGY | Facility: CLINIC | Age: 59
End: 2019-06-19

## 2019-06-19 LAB
BACTERIA UR CULT: NORMAL
BACTERIA UR CULT: NORMAL

## 2019-06-19 NOTE — TELEPHONE ENCOUNTER
----- Message from Kendra Batista NP sent at 6/19/2019  9:44 AM CDT -----  Please inform patient she does not have a UTI.

## 2019-07-23 ENCOUNTER — PATIENT MESSAGE (OUTPATIENT)
Dept: UROLOGY | Facility: CLINIC | Age: 59
End: 2019-07-23

## 2019-07-23 DIAGNOSIS — N20.0 RENAL CALCULUS, RIGHT: Primary | ICD-10-CM

## 2019-07-23 RX ORDER — SODIUM CHLORIDE 9 MG/ML
INJECTION, SOLUTION INTRAVENOUS CONTINUOUS
Status: CANCELLED | OUTPATIENT
Start: 2019-07-23

## 2019-07-23 RX ORDER — CIPROFLOXACIN 2 MG/ML
400 INJECTION, SOLUTION INTRAVENOUS
Status: CANCELLED | OUTPATIENT
Start: 2019-07-23

## 2019-07-24 ENCOUNTER — PATIENT MESSAGE (OUTPATIENT)
Dept: UROLOGY | Facility: CLINIC | Age: 59
End: 2019-07-24

## 2019-09-03 ENCOUNTER — PATIENT MESSAGE (OUTPATIENT)
Dept: UROLOGY | Facility: CLINIC | Age: 59
End: 2019-09-03

## 2019-09-03 ENCOUNTER — OFFICE VISIT (OUTPATIENT)
Dept: UROLOGY | Facility: CLINIC | Age: 59
End: 2019-09-03
Payer: COMMERCIAL

## 2019-09-03 VITALS
RESPIRATION RATE: 18 BRPM | OXYGEN SATURATION: 97 % | WEIGHT: 274 LBS | BODY MASS INDEX: 45.65 KG/M2 | HEART RATE: 86 BPM | HEIGHT: 65 IN | SYSTOLIC BLOOD PRESSURE: 142 MMHG | DIASTOLIC BLOOD PRESSURE: 80 MMHG

## 2019-09-03 DIAGNOSIS — Z98.890 POST-OPERATIVE STATE: Primary | ICD-10-CM

## 2019-09-03 DIAGNOSIS — N20.0 KIDNEY STONE: Primary | ICD-10-CM

## 2019-09-03 DIAGNOSIS — N20.0 RENAL CALCULUS, RIGHT: ICD-10-CM

## 2019-09-03 DIAGNOSIS — N23 RENAL COLIC ON RIGHT SIDE: ICD-10-CM

## 2019-09-03 PROCEDURE — 99999 PR PBB SHADOW E&M-EST. PATIENT-LVL V: CPT | Mod: PBBFAC,,, | Performed by: NURSE PRACTITIONER

## 2019-09-03 PROCEDURE — 99024 POSTOP FOLLOW-UP VISIT: CPT | Mod: S$GLB,,, | Performed by: NURSE PRACTITIONER

## 2019-09-03 PROCEDURE — 99999 PR PBB SHADOW E&M-EST. PATIENT-LVL V: ICD-10-PCS | Mod: PBBFAC,,, | Performed by: NURSE PRACTITIONER

## 2019-09-03 PROCEDURE — 99024 PR POST-OP FOLLOW-UP VISIT: ICD-10-PCS | Mod: S$GLB,,, | Performed by: NURSE PRACTITIONER

## 2019-09-03 RX ORDER — NYSTATIN 100000 U/G
CREAM TOPICAL
Refills: 0 | COMMUNITY
Start: 2019-08-05 | End: 2020-10-13

## 2019-09-03 RX ORDER — TAMSULOSIN HYDROCHLORIDE 0.4 MG/1
0.4 CAPSULE ORAL NIGHTLY
Qty: 30 CAPSULE | Refills: 2 | Status: SHIPPED | OUTPATIENT
Start: 2019-09-03 | End: 2020-02-13

## 2019-09-03 RX ORDER — POTASSIUM CITRATE 10 MEQ/1
10 TABLET, EXTENDED RELEASE ORAL
Qty: 90 TABLET | Refills: 2 | Status: SHIPPED | OUTPATIENT
Start: 2019-09-03 | End: 2020-07-20

## 2019-09-03 NOTE — PROGRESS NOTES
Subjective:       Patient ID: Aminta Schreiber is a 59 y.o. female.    Chief Complaint: Post-op Evaluation (litho) and Results (kub)    Patient is here today for her post-op evaluation. She is a S/P right renal lithotripsy by Dr. Garland on 7/25/19. Post-op KUB performed today and showed a 5 mm stone overlying the right kidney. KUB results discussed with patient. Patient denies any pain at this time. She is currently taking tamsulosin and Urocit-K.     Other   Chronicity: S/P right renal lithotripsy  Episode onset: 7/25/19. The problem has been resolved. Associated symptoms include headaches and urinary symptoms (urgency). Pertinent negatives include no abdominal pain, anorexia, arthralgias, change in bowel habit, chills, diaphoresis, fatigue, fever, nausea, swollen glands, vomiting or weakness. Nothing aggravates the symptoms. Treatments tried: right renal lithotripsy  The treatment provided significant relief.     Review of Systems   Constitutional: Negative for appetite change, chills, diaphoresis, fatigue and fever.   Gastrointestinal: Negative for abdominal pain, anorexia, change in bowel habit, constipation, diarrhea, nausea and vomiting.   Genitourinary: Positive for urgency. Negative for decreased urine volume, difficulty urinating, dysuria, flank pain, frequency, hematuria, vaginal bleeding, vaginal discharge and vaginal pain.   Musculoskeletal: Negative for arthralgias.   Neurological: Positive for headaches. Negative for dizziness and weakness.   Psychiatric/Behavioral: Negative.        Objective:      Physical Exam   Constitutional: She is oriented to person, place, and time. No distress.   Morbidly obese   HENT:   Head: Normocephalic and atraumatic.   Eyes: Pupils are equal, round, and reactive to light. EOM are normal.   Neck: Normal range of motion.   Cardiovascular: Normal rate.   Pulmonary/Chest: Effort normal. No respiratory distress.   Abdominal: Soft. There is no tenderness.   Musculoskeletal: Normal  range of motion. She exhibits no edema.   No bilateral CVAT.   Neurological: She is alert and oriented to person, place, and time. Coordination normal.   Skin: Skin is warm and dry.   Psychiatric: She has a normal mood and affect. Her behavior is normal. Judgment and thought content normal.   Nursing note and vitals reviewed.      Assessment:       1. Post-operative state    2. Renal calculus, right    3. Renal colic on right side        Plan:       Aminta was seen today for post-op evaluation and results.    Diagnoses and all orders for this visit:    Post-operative state    Renal calculus, right  -     tamsulosin (FLOMAX) 0.4 mg Cap; Take 1 capsule (0.4 mg total) by mouth every evening.  -     potassium citrate (UROCIT-K) 10 mEq (1,080 mg) TbSR; Take 1 tablet (10 mEq total) by mouth 3 (three) times daily with meals.  -     X-Ray Abdomen AP 1 View; Future    Renal colic on right side    Other orders  1. Continue tamsulosin 0.4 mg daily to help pass stone. REFILLED  2. Continue Urocit-K to help dissolve stone or stop stone from growing. REFILLED  3. Strain urine at home and bring in any collected stone particles to clinic for analysis.  4. Drink at least 2 liters of water daily.     Follow-up in 3 months with KUB (prior to F/U appt).     Kendra Batista NP

## 2019-09-03 NOTE — PATIENT INSTRUCTIONS
1. Continue tamsulosin 0.4 mg daily to help pass stone. REFILLED  2. Continue Urocit-K to help dissolve stone or stop stone from growing. REFILLED  3. Strain urine at home and bring in any collected stone particles to clinic for analysis.  4. Drink at least 2 liters of water daily.   5. Follow-up in 3 months with KUB (prior to F/U appt).

## 2019-09-08 ENCOUNTER — PATIENT MESSAGE (OUTPATIENT)
Dept: UROLOGY | Facility: CLINIC | Age: 59
End: 2019-09-08

## 2019-09-09 ENCOUNTER — TELEPHONE (OUTPATIENT)
Dept: UROLOGY | Facility: CLINIC | Age: 59
End: 2019-09-09

## 2019-09-12 ENCOUNTER — TELEPHONE (OUTPATIENT)
Dept: UROLOGY | Facility: CLINIC | Age: 59
End: 2019-09-12

## 2019-09-12 ENCOUNTER — PATIENT MESSAGE (OUTPATIENT)
Dept: UROLOGY | Facility: CLINIC | Age: 59
End: 2019-09-12

## 2019-09-12 NOTE — TELEPHONE ENCOUNTER
----- Message from Milton Garland MD sent at 9/12/2019  2:42 PM CDT -----  The ultrasound does not show any nephrolithiasis or obstruction or hydronephrosis.  If the patient still is having pain then recommend CT scan the abdomen and pelvis to see if there is any other causes of her pain.  Please notify patient thank you

## 2019-12-04 ENCOUNTER — OFFICE VISIT (OUTPATIENT)
Dept: UROLOGY | Facility: CLINIC | Age: 59
End: 2019-12-04
Payer: COMMERCIAL

## 2019-12-04 VITALS
DIASTOLIC BLOOD PRESSURE: 75 MMHG | SYSTOLIC BLOOD PRESSURE: 132 MMHG | WEIGHT: 274.06 LBS | TEMPERATURE: 98 F | BODY MASS INDEX: 45.66 KG/M2 | HEART RATE: 78 BPM | HEIGHT: 65 IN

## 2019-12-04 DIAGNOSIS — N20.0 NEPHROLITHIASIS: Primary | ICD-10-CM

## 2019-12-04 PROCEDURE — 3008F PR BODY MASS INDEX (BMI) DOCUMENTED: ICD-10-PCS | Mod: CPTII,S$GLB,, | Performed by: UROLOGY

## 2019-12-04 PROCEDURE — 3078F PR MOST RECENT DIASTOLIC BLOOD PRESSURE < 80 MM HG: ICD-10-PCS | Mod: CPTII,S$GLB,, | Performed by: UROLOGY

## 2019-12-04 PROCEDURE — 3078F DIAST BP <80 MM HG: CPT | Mod: CPTII,S$GLB,, | Performed by: UROLOGY

## 2019-12-04 PROCEDURE — 3008F BODY MASS INDEX DOCD: CPT | Mod: CPTII,S$GLB,, | Performed by: UROLOGY

## 2019-12-04 PROCEDURE — 3075F PR MOST RECENT SYSTOLIC BLOOD PRESS GE 130-139MM HG: ICD-10-PCS | Mod: CPTII,S$GLB,, | Performed by: UROLOGY

## 2019-12-04 PROCEDURE — 99999 PR PBB SHADOW E&M-EST. PATIENT-LVL III: ICD-10-PCS | Mod: PBBFAC,,, | Performed by: UROLOGY

## 2019-12-04 PROCEDURE — 3075F SYST BP GE 130 - 139MM HG: CPT | Mod: CPTII,S$GLB,, | Performed by: UROLOGY

## 2019-12-04 PROCEDURE — 99999 PR PBB SHADOW E&M-EST. PATIENT-LVL III: CPT | Mod: PBBFAC,,, | Performed by: UROLOGY

## 2019-12-04 PROCEDURE — 99214 OFFICE O/P EST MOD 30 MIN: CPT | Mod: S$GLB,,, | Performed by: UROLOGY

## 2019-12-04 PROCEDURE — 99214 PR OFFICE/OUTPT VISIT, EST, LEVL IV, 30-39 MIN: ICD-10-PCS | Mod: S$GLB,,, | Performed by: UROLOGY

## 2019-12-04 RX ORDER — FOLIC ACID 1 MG/1
1000 TABLET ORAL DAILY
Refills: 2 | COMMUNITY
Start: 2019-11-13 | End: 2020-10-19 | Stop reason: SDUPTHER

## 2019-12-04 RX ORDER — HYDROCHLOROTHIAZIDE 12.5 MG/1
12.5 TABLET ORAL 2 TIMES DAILY
Refills: 0 | COMMUNITY
Start: 2019-11-05 | End: 2020-07-20

## 2019-12-04 RX ORDER — LOSARTAN POTASSIUM 50 MG/1
50 TABLET ORAL 2 TIMES DAILY
Refills: 0 | COMMUNITY
Start: 2019-11-05 | End: 2020-07-20

## 2019-12-04 RX ORDER — FERROUS SULFATE 325(65) MG
1 TABLET ORAL DAILY
Refills: 2 | COMMUNITY
Start: 2019-11-13 | End: 2020-12-28 | Stop reason: SDUPTHER

## 2019-12-04 NOTE — PROGRESS NOTES
Subjective:       Patient ID: Aminta Schreiber is a 59 y.o. female.    Chief Complaint: Follow-up    59 y.o. AAF with history of nephrolithiasis presents for f/u. U/S 9/19 shows no residual calculi. No complaints. KUB with 3 mm lateral, mid pole Right kidney.          Other   This is a recurrent (Nephrolithiasis) problem. The current episode started more than 1 year ago. The problem occurs constantly. The problem has been unchanged. Pertinent negatives include no abdominal pain, anorexia, arthralgias, change in bowel habit, chest pain, chills, congestion, coughing, diaphoresis, fatigue, fever, headaches, joint swelling, myalgias, nausea, neck pain, numbness, rash, sore throat, swollen glands, urinary symptoms, vertigo, visual change, vomiting or weakness. Nothing aggravates the symptoms. Treatments tried: ESWL. The treatment provided significant relief.     Review of Systems   Constitutional: Negative for activity change, appetite change, chills, diaphoresis, fatigue and fever.   HENT: Negative for congestion, ear pain, hearing loss, nosebleeds, sinus pressure, sore throat and trouble swallowing.    Eyes: Negative for pain and visual disturbance.   Respiratory: Negative for apnea, cough and shortness of breath.    Cardiovascular: Negative for chest pain and leg swelling.   Gastrointestinal: Negative for abdominal distention, abdominal pain, anal bleeding, anorexia, blood in stool, change in bowel habit, constipation, diarrhea, nausea, rectal pain and vomiting.   Endocrine: Negative for cold intolerance, heat intolerance, polydipsia, polyphagia and polyuria.   Genitourinary: Negative for decreased urine volume, difficulty urinating, dyspareunia, dysuria, enuresis, flank pain, frequency, genital sores, hematuria, menstrual problem, pelvic pain, urgency, vaginal bleeding, vaginal discharge and vaginal pain.   Musculoskeletal: Negative for arthralgias, back pain, joint swelling, myalgias and neck pain.   Skin: Negative for  color change, pallor and rash.   Allergic/Immunologic: Negative for environmental allergies, food allergies and immunocompromised state.   Neurological: Negative for dizziness, vertigo, speech difficulty, weakness, numbness and headaches.   Hematological: Negative for adenopathy. Does not bruise/bleed easily.   Psychiatric/Behavioral: Negative.        Objective:      Physical Exam   Nursing note and vitals reviewed.  Constitutional: She is oriented to person, place, and time. She appears well-developed and well-nourished.   HENT:   Head: Normocephalic.   Nose: Nose normal.   Mouth/Throat: Oropharynx is clear and moist.   Eyes: Conjunctivae and EOM are normal. Pupils are equal, round, and reactive to light.   Neck: Normal range of motion. Neck supple.   Cardiovascular: Normal rate, regular rhythm, normal heart sounds and intact distal pulses.    Pulmonary/Chest: Effort normal and breath sounds normal.   Abdominal: Soft. Bowel sounds are normal.   Genitourinary: Vagina normal.   Musculoskeletal: Normal range of motion.   Neurological: She is alert and oriented to person, place, and time. She has normal reflexes.   Skin: Skin is warm and dry.     Psychiatric: She has a normal mood and affect. Her behavior is normal. Judgment and thought content normal.       Assessment:       1. Nephrolithiasis        Plan:       Patient Instructions   Continue IVF's and HCTZ.

## 2020-02-04 ENCOUNTER — PATIENT MESSAGE (OUTPATIENT)
Dept: UROLOGY | Facility: CLINIC | Age: 60
End: 2020-02-04

## 2020-02-04 DIAGNOSIS — N32.81 OVERACTIVE BLADDER: ICD-10-CM

## 2020-02-04 RX ORDER — TROSPIUM CHLORIDE 20 MG/1
20 TABLET, FILM COATED ORAL 2 TIMES DAILY
Qty: 180 TABLET | Refills: 1 | Status: CANCELLED | OUTPATIENT
Start: 2020-02-04 | End: 2020-08-02

## 2020-02-13 DIAGNOSIS — N20.0 RENAL CALCULUS, RIGHT: ICD-10-CM

## 2020-02-13 RX ORDER — TAMSULOSIN HYDROCHLORIDE 0.4 MG/1
0.4 CAPSULE ORAL DAILY
Qty: 30 CAPSULE | Refills: 2 | Status: SHIPPED | OUTPATIENT
Start: 2020-02-13 | End: 2020-07-20

## 2020-02-24 ENCOUNTER — PATIENT MESSAGE (OUTPATIENT)
Dept: UROLOGY | Facility: CLINIC | Age: 60
End: 2020-02-24

## 2020-03-11 ENCOUNTER — OFFICE VISIT (OUTPATIENT)
Dept: UROLOGY | Facility: CLINIC | Age: 60
End: 2020-03-11
Payer: COMMERCIAL

## 2020-03-11 VITALS
TEMPERATURE: 98 F | HEART RATE: 82 BPM | OXYGEN SATURATION: 98 % | RESPIRATION RATE: 17 BRPM | WEIGHT: 274 LBS | HEIGHT: 65 IN | DIASTOLIC BLOOD PRESSURE: 74 MMHG | BODY MASS INDEX: 45.65 KG/M2 | SYSTOLIC BLOOD PRESSURE: 126 MMHG

## 2020-03-11 DIAGNOSIS — M54.50 CHRONIC MIDLINE LOW BACK PAIN WITHOUT SCIATICA: ICD-10-CM

## 2020-03-11 DIAGNOSIS — G89.29 CHRONIC MIDLINE LOW BACK PAIN WITHOUT SCIATICA: ICD-10-CM

## 2020-03-11 DIAGNOSIS — N20.0 NEPHROLITHIASIS: Primary | ICD-10-CM

## 2020-03-11 PROCEDURE — 3078F DIAST BP <80 MM HG: CPT | Mod: CPTII,S$GLB,, | Performed by: UROLOGY

## 2020-03-11 PROCEDURE — 3008F BODY MASS INDEX DOCD: CPT | Mod: CPTII,S$GLB,, | Performed by: UROLOGY

## 2020-03-11 PROCEDURE — 3074F SYST BP LT 130 MM HG: CPT | Mod: CPTII,S$GLB,, | Performed by: UROLOGY

## 2020-03-11 PROCEDURE — 3008F PR BODY MASS INDEX (BMI) DOCUMENTED: ICD-10-PCS | Mod: CPTII,S$GLB,, | Performed by: UROLOGY

## 2020-03-11 PROCEDURE — 99213 PR OFFICE/OUTPT VISIT, EST, LEVL III, 20-29 MIN: ICD-10-PCS | Mod: S$GLB,,, | Performed by: UROLOGY

## 2020-03-11 PROCEDURE — 3074F PR MOST RECENT SYSTOLIC BLOOD PRESSURE < 130 MM HG: ICD-10-PCS | Mod: CPTII,S$GLB,, | Performed by: UROLOGY

## 2020-03-11 PROCEDURE — 3078F PR MOST RECENT DIASTOLIC BLOOD PRESSURE < 80 MM HG: ICD-10-PCS | Mod: CPTII,S$GLB,, | Performed by: UROLOGY

## 2020-03-11 PROCEDURE — 99213 OFFICE O/P EST LOW 20 MIN: CPT | Mod: S$GLB,,, | Performed by: UROLOGY

## 2020-03-11 PROCEDURE — 99999 PR PBB SHADOW E&M-EST. PATIENT-LVL IV: CPT | Mod: PBBFAC,,, | Performed by: UROLOGY

## 2020-03-11 PROCEDURE — 99999 PR PBB SHADOW E&M-EST. PATIENT-LVL IV: ICD-10-PCS | Mod: PBBFAC,,, | Performed by: UROLOGY

## 2020-03-11 RX ORDER — ETODOLAC 300 MG/1
300 CAPSULE ORAL 3 TIMES DAILY
Qty: 90 CAPSULE | Refills: 11 | Status: SHIPPED | OUTPATIENT
Start: 2020-03-11 | End: 2021-03-09

## 2020-03-11 NOTE — PROGRESS NOTES
Subjective:       Patient ID: Aminta Schreiber is a 59 y.o. female.    Chief Complaint: Nephrolithiasis    58 yo AAF with hx of recurrent nephrolithiasis. Recent U/S was negative. C/O of low back pain    Other   This is a chronic problem. The current episode started more than 1 year ago. The problem occurs intermittently. The problem has been resolved. Pertinent negatives include no abdominal pain, anorexia, arthralgias, change in bowel habit, chest pain, chills, congestion, coughing, diaphoresis, fatigue, fever, headaches, joint swelling, myalgias, nausea, neck pain, numbness, rash, sore throat, swollen glands, urinary symptoms, vertigo, visual change, vomiting or weakness. Nothing aggravates the symptoms. She has tried nothing for the symptoms. The treatment provided significant relief.     Review of Systems   Constitutional: Negative for activity change, appetite change, chills, diaphoresis, fatigue and fever.   HENT: Negative for congestion, ear pain, hearing loss, nosebleeds, sinus pressure, sore throat and trouble swallowing.    Eyes: Negative for pain and visual disturbance.   Respiratory: Negative for apnea, cough and shortness of breath.    Cardiovascular: Negative for chest pain and leg swelling.   Gastrointestinal: Negative for abdominal distention, abdominal pain, anal bleeding, anorexia, blood in stool, change in bowel habit, constipation, diarrhea, nausea, rectal pain and vomiting.   Endocrine: Negative for cold intolerance, heat intolerance, polydipsia, polyphagia and polyuria.   Genitourinary: Negative for decreased urine volume, difficulty urinating, dyspareunia, dysuria, enuresis, flank pain, frequency, genital sores, hematuria, menstrual problem, pelvic pain, urgency, vaginal bleeding, vaginal discharge and vaginal pain.   Musculoskeletal: Negative for arthralgias, back pain, joint swelling, myalgias and neck pain.   Skin: Negative for color change, pallor and rash.   Allergic/Immunologic: Negative for  environmental allergies, food allergies and immunocompromised state.   Neurological: Negative for dizziness, vertigo, speech difficulty, weakness, numbness and headaches.   Hematological: Negative for adenopathy. Does not bruise/bleed easily.   Psychiatric/Behavioral: Negative.        Objective:      Physical Exam   Nursing note and vitals reviewed.  Constitutional: She is oriented to person, place, and time. She appears well-developed and well-nourished.   HENT:   Head: Normocephalic.   Nose: Nose normal.   Mouth/Throat: Oropharynx is clear and moist.   Eyes: Conjunctivae and EOM are normal. Pupils are equal, round, and reactive to light.   Neck: Normal range of motion. Neck supple.   Cardiovascular: Normal rate, regular rhythm, normal heart sounds and intact distal pulses.    Pulmonary/Chest: Effort normal and breath sounds normal.   Abdominal: Soft. Bowel sounds are normal.   Musculoskeletal: Normal range of motion.   Neurological: She is alert and oriented to person, place, and time. She has normal reflexes.   Skin: Skin is warm and dry.     Psychiatric: She has a normal mood and affect. Her behavior is normal. Judgment and thought content normal.       Assessment:       1. Nephrolithiasis    2. Chronic midline low back pain without sciatica        Plan:       Patient Instructions   Lodine for back pain  F/U 6 months with Renal U/S

## 2020-04-13 DIAGNOSIS — N32.81 OVERACTIVE BLADDER: ICD-10-CM

## 2020-04-13 RX ORDER — TROSPIUM CHLORIDE 20 MG/1
TABLET, FILM COATED ORAL
Qty: 60 TABLET | Refills: 2 | Status: SHIPPED | OUTPATIENT
Start: 2020-04-13 | End: 2020-07-09

## 2020-05-11 ENCOUNTER — PATIENT MESSAGE (OUTPATIENT)
Dept: UROLOGY | Facility: CLINIC | Age: 60
End: 2020-05-11

## 2020-06-22 ENCOUNTER — PATIENT MESSAGE (OUTPATIENT)
Dept: UROLOGY | Facility: CLINIC | Age: 60
End: 2020-06-22

## 2020-06-23 ENCOUNTER — OFFICE VISIT (OUTPATIENT)
Dept: UROLOGY | Facility: CLINIC | Age: 60
End: 2020-06-23
Payer: COMMERCIAL

## 2020-06-23 VITALS
HEIGHT: 65 IN | WEIGHT: 262.19 LBS | BODY MASS INDEX: 43.68 KG/M2 | SYSTOLIC BLOOD PRESSURE: 109 MMHG | HEART RATE: 80 BPM | TEMPERATURE: 97 F | DIASTOLIC BLOOD PRESSURE: 69 MMHG

## 2020-06-23 DIAGNOSIS — R31.29 MICROSCOPIC HEMATURIA: ICD-10-CM

## 2020-06-23 DIAGNOSIS — N23 RENAL COLIC ON RIGHT SIDE: Primary | ICD-10-CM

## 2020-06-23 DIAGNOSIS — Z87.442 HISTORY OF NEPHROLITHIASIS: ICD-10-CM

## 2020-06-23 LAB
BILIRUB SERPL-MCNC: ABNORMAL MG/DL
BLOOD URINE, POC: ABNORMAL
CLARITY, POC UA: ABNORMAL
COLOR, POC UA: YELLOW
GLUCOSE UR QL STRIP: ABNORMAL
KETONES UR QL STRIP: ABNORMAL
LEUKOCYTE ESTERASE URINE, POC: ABNORMAL
NITRITE, POC UA: ABNORMAL
PH, POC UA: 5
PROTEIN, POC: ABNORMAL
SPECIFIC GRAVITY, POC UA: 1.02
UROBILINOGEN, POC UA: 0.2

## 2020-06-23 PROCEDURE — 3074F SYST BP LT 130 MM HG: CPT | Mod: CPTII,S$GLB,, | Performed by: NURSE PRACTITIONER

## 2020-06-23 PROCEDURE — 99999 PR PBB SHADOW E&M-EST. PATIENT-LVL V: ICD-10-PCS | Mod: PBBFAC,,, | Performed by: NURSE PRACTITIONER

## 2020-06-23 PROCEDURE — 81002 POCT URINE DIPSTICK WITHOUT MICROSCOPE: ICD-10-PCS | Mod: S$GLB,,, | Performed by: NURSE PRACTITIONER

## 2020-06-23 PROCEDURE — 3008F BODY MASS INDEX DOCD: CPT | Mod: CPTII,S$GLB,, | Performed by: NURSE PRACTITIONER

## 2020-06-23 PROCEDURE — 99213 PR OFFICE/OUTPT VISIT, EST, LEVL III, 20-29 MIN: ICD-10-PCS | Mod: 25,S$GLB,, | Performed by: NURSE PRACTITIONER

## 2020-06-23 PROCEDURE — 81002 URINALYSIS NONAUTO W/O SCOPE: CPT | Mod: S$GLB,,, | Performed by: NURSE PRACTITIONER

## 2020-06-23 PROCEDURE — 87086 URINE CULTURE/COLONY COUNT: CPT

## 2020-06-23 PROCEDURE — 99213 OFFICE O/P EST LOW 20 MIN: CPT | Mod: 25,S$GLB,, | Performed by: NURSE PRACTITIONER

## 2020-06-23 PROCEDURE — 3074F PR MOST RECENT SYSTOLIC BLOOD PRESSURE < 130 MM HG: ICD-10-PCS | Mod: CPTII,S$GLB,, | Performed by: NURSE PRACTITIONER

## 2020-06-23 PROCEDURE — 3008F PR BODY MASS INDEX (BMI) DOCUMENTED: ICD-10-PCS | Mod: CPTII,S$GLB,, | Performed by: NURSE PRACTITIONER

## 2020-06-23 PROCEDURE — 3078F PR MOST RECENT DIASTOLIC BLOOD PRESSURE < 80 MM HG: ICD-10-PCS | Mod: CPTII,S$GLB,, | Performed by: NURSE PRACTITIONER

## 2020-06-23 PROCEDURE — 3078F DIAST BP <80 MM HG: CPT | Mod: CPTII,S$GLB,, | Performed by: NURSE PRACTITIONER

## 2020-06-23 PROCEDURE — 99999 PR PBB SHADOW E&M-EST. PATIENT-LVL V: CPT | Mod: PBBFAC,,, | Performed by: NURSE PRACTITIONER

## 2020-06-23 RX ORDER — IBUPROFEN 100 MG/5ML
1000 SUSPENSION, ORAL (FINAL DOSE FORM) ORAL DAILY
COMMUNITY

## 2020-06-23 RX ORDER — DIOSMIN COMPLEX NO.1 630 MG
1 TABLET ORAL DAILY
COMMUNITY
Start: 2020-04-06 | End: 2020-09-27

## 2020-06-23 RX ORDER — UBIDECARENONE 30 MG
30 CAPSULE ORAL 3 TIMES DAILY
COMMUNITY
End: 2021-06-08

## 2020-06-23 NOTE — PROGRESS NOTES
Subjective:       Patient ID: Aminta Schreiber is a 60 y.o. female.    Chief Complaint: Nephrolithiasis (hx) and Flank Pain (right)    Patient is a 61 yo AAF who is here today with complaints of right flank pain. Patient has a hx of nephrolithiasis with last lithotripsy sx in 7/2019.     Flank Pain  This is a new problem. Episode onset: 2 weeks ago. The problem occurs daily. The problem has been gradually worsening (over the last 3 days) since onset. The pain is present in the costovertebral angle (right). Quality: dull. The pain does not radiate. The pain is at a severity of 5/10. The pain is moderate. Pertinent negatives include no abdominal pain, bladder incontinence, bowel incontinence, dysuria, fever, headaches, pelvic pain, weakness or weight loss. Risk factors include obesity, lack of exercise, sedentary lifestyle and renal stones. She has tried nothing for the symptoms.     Review of Systems   Constitutional: Positive for fatigue. Negative for appetite change, chills, fever and weight loss.   Gastrointestinal: Positive for nausea and vomiting (resolved). Negative for abdominal pain, bowel incontinence, change in bowel habit, constipation, diarrhea and change in bowel habit.   Genitourinary: Positive for flank pain (right) and urgency. Negative for bladder incontinence, decreased urine volume, difficulty urinating, dysuria, frequency, hematuria, pelvic pain, vaginal bleeding, vaginal discharge and vaginal pain.   Neurological: Negative for dizziness, weakness and headaches.         Objective:      Physical Exam  Vitals signs and nursing note reviewed.   Constitutional:       General: She is not in acute distress.     Appearance: Normal appearance. She is well-developed. She is obese.   HENT:      Head: Normocephalic and atraumatic.   Eyes:      Pupils: Pupils are equal, round, and reactive to light.   Neck:      Musculoskeletal: Normal range of motion.   Cardiovascular:      Rate and Rhythm: Normal rate.   Pulmonary:       Effort: Pulmonary effort is normal. No respiratory distress.   Abdominal:      Palpations: Abdomen is soft.      Tenderness: There is no abdominal tenderness.   Musculoskeletal: Normal range of motion.      Comments: Right CVAT present.   Skin:     General: Skin is warm and dry.   Neurological:      Mental Status: She is alert and oriented to person, place, and time.      Coordination: Coordination normal.   Psychiatric:         Behavior: Behavior normal.         Thought Content: Thought content normal.         Judgment: Judgment normal.         Assessment:       1. Renal colic on right side    2. Microscopic hematuria    3. History of nephrolithiasis        Plan:        Aminta was seen today for pain, nephrolithiasis and flank pain.    Diagnoses and all orders for this visit:    Renal colic on right side  -     POCT URINE DIPSTICK WITHOUT MICROSCOPE  -     Urine culture  -     CT Renal Stone Study ABD Pelvis WO; Future    Microscopic hematuria  -     POCT URINE DIPSTICK WITHOUT MICROSCOPE  -     Urine culture  -     CT Renal Stone Study ABD Pelvis WO; Future    History of nephrolithiasis  -     POCT URINE DIPSTICK WITHOUT MICROSCOPE  -     Urine culture  -     CT Renal Stone Study ABD Pelvis WO; Future    Other orders  1. Continue taking Urocit-K to help prevent renal stones.  2. Continue to take etodolac as previously prescribed for pain.     Follow-up pending urine cx and CT.    Kendra Batista NP

## 2020-06-23 NOTE — PATIENT INSTRUCTIONS
1. Urine dipstick  2. Urine cx  3. CT RSS  4. Continue taking Urocit-K to help prevent renal stones.  5. Continue to take etodolac as previously prescribed for pain.   6. Follow-up pending urine cx and CT.

## 2020-06-24 ENCOUNTER — TELEPHONE (OUTPATIENT)
Dept: UROLOGY | Facility: CLINIC | Age: 60
End: 2020-06-24

## 2020-06-24 LAB
BACTERIA UR CULT: NORMAL
BACTERIA UR CULT: NORMAL

## 2020-06-24 NOTE — TELEPHONE ENCOUNTER
----- Message from Kendra Batista NP sent at 6/24/2020 10:33 AM CDT -----  Please inform patient her CT RSS did not show any urolithiasis (stones). Urine cx still pending.

## 2020-06-26 ENCOUNTER — TELEPHONE (OUTPATIENT)
Dept: UROLOGY | Facility: CLINIC | Age: 60
End: 2020-06-26

## 2020-06-26 NOTE — TELEPHONE ENCOUNTER
----- Message from Kendra Batista NP sent at 6/25/2020  8:21 AM CDT -----  Please inform patient her urine cx showed some bacteria, but none in predominance to diagnose a UTI. If urinary symptoms still present, repeat urine cx (I&O cath).

## 2020-07-06 ENCOUNTER — PATIENT OUTREACH (OUTPATIENT)
Dept: ADMINISTRATIVE | Facility: HOSPITAL | Age: 60
End: 2020-07-06

## 2020-07-06 NOTE — PROGRESS NOTES
Updated immunizations.   Updated 2020 Mammogram from [facility].   Updated [facility] account.   Updated 2014 & 2017 Colonoscopies from .   Updated 2013 Pap Smear from .   Updated modifiers- pt not on Diabetic Registry.

## 2020-07-17 PROBLEM — N20.1 RIGHT URETERAL CALCULUS: Status: RESOLVED | Noted: 2019-04-30 | Resolved: 2020-07-17

## 2020-07-17 PROBLEM — R30.0 DYSURIA: Status: RESOLVED | Noted: 2019-05-22 | Resolved: 2020-07-17

## 2020-07-17 PROBLEM — R31.0 GROSS HEMATURIA: Status: RESOLVED | Noted: 2019-04-30 | Resolved: 2020-07-17

## 2020-07-17 PROBLEM — R35.1 NOCTURIA: Status: RESOLVED | Noted: 2019-01-21 | Resolved: 2020-07-17

## 2020-07-17 PROBLEM — Z87.442 HISTORY OF NEPHROLITHIASIS: Status: ACTIVE | Noted: 2020-07-17

## 2020-07-17 PROBLEM — R39.15 URINARY URGENCY: Status: RESOLVED | Noted: 2019-05-22 | Resolved: 2020-07-17

## 2020-07-17 PROBLEM — N20.0 KIDNEY STONE: Status: RESOLVED | Noted: 2019-05-02 | Resolved: 2020-07-17

## 2020-07-17 PROBLEM — N20.0 NEPHROLITHIASIS: Status: RESOLVED | Noted: 2018-07-24 | Resolved: 2020-07-17

## 2020-07-17 PROBLEM — R11.2 NAUSEA & VOMITING: Status: RESOLVED | Noted: 2019-04-30 | Resolved: 2020-07-17

## 2020-07-17 NOTE — PROGRESS NOTES
FAMILY MEDICINE  [facility]    Patient Active Problem List   Diagnosis    Diabetes mellitus, type 2    GERD (gastroesophageal reflux disease)    Hyperlipidemia    Hypertension    Raynaud's disease    Myelolipoma of left adrenal gland    Myelolipoma of right adrenal gland    Overactive bladder    Chronic midline low back pain without sciatica    History of nephrolithiasis    Benign lipomatous neoplasm of skin, subcu of right arm    History of benign breast tumor       CC:   Chief Complaint   Patient presents with    Establish Care       HPI: Aminta Schreiber is a 60 y.o. female  Has Obesity, Diabetes mellitus, type 2; GERD (gastroesophageal reflux disease); Hyperlipidemia; Hypertension; Raynaud's disease; Myelolipoma of left adrenal gland; Myelolipoma of right adrenal gland; Overactive bladder; Chronic midline low back pain without sciatica; and History of nephrolithiasis on their problem list.  - presents to establish care. Last PCP NP Rena with Dr. Lance (just saw PCP 1 month ago and got refills)     Urology Brogle (nephrolithiasis)  Rheumatology: Dr. Live WALSH  Endocrine: Dr. Scott Arevalo (monitor adrenals and no issues)  Gynecology Dr. Vero Sanz   CRISTI: signed    1. Diabetes Type 2    Age diagnosed: 50's    Current treatment regimen:   metFORMIN (GLUCOPHAGE-XR) 500 MG 24 hr tablet, Take 1,000 mg by mouth once daily. , Disp: , Rfl:     Side effects from treatment: denies  Complications of diabetes: none    Glucometer: yes  Glucose monitoring: fasting daily or s/p dinner  A. Fastin mg/dL  7day: NA 14 day: NA 30 day: NA    3/2020 (Dr. Lance) 6.5%    Last A1C:   No results found for: HGBA1C    Low dose statin: yes  Last eye exam: Dr. Mallorie Garcia [facility] (pending and reports pushed back due to Covid-19)    Last foot exam: due today 2020     Vaccines:   Influenza: due each fall  Pneumovax: 23 due today 2020  Prevnar 13: NA    2. Hypertension  - DM2, HLD  - BP goal <  140/90    Current medication treatment:   losartan-hydrochlorothiazide 50-12.5 mg (HYZAAR) 50-12.5 mg per tablet, losartan 50 mg-hydrochlorothiazide 12.5 mg tablet  twice daily, Disp: , Rfl:     Medication side effects: denies           HEALTH MAINTENANCE:   Health Maintenance   Topic Date Due    Hepatitis C Screening  1960    TETANUS VACCINE  06/14/1977    Pneumococcal Vaccine (Medium Risk) (1 of 1 - PPSV23) 06/14/1978    Mammogram  03/16/2022    Lipid Panel  11/27/2022     Health Maintenance Topics with due status: Not Due       Topic Last Completion Date    Colorectal Cancer Screening 04/14/2017    Lipid Panel 11/27/2017    Influenza Vaccine 10/21/2018    Mammogram 03/16/2020     Health Maintenance Due   Topic Date Due    Hepatitis C Screening  1960    HIV Screening  06/14/1974    TETANUS VACCINE  06/14/1977    Pneumococcal Vaccine (Medium Risk) (1 of 1 - PPSV23) 06/14/1978    Shingles Vaccine (1 of 2) 06/14/2009       ROS: Review of Systems   Constitutional: Negative.    HENT: Negative.    Eyes: Negative.    Respiratory: Negative.    Cardiovascular: Negative.    Gastrointestinal: Negative.    Endocrine: Negative.    Genitourinary: Negative.    Musculoskeletal: Positive for arthralgias and back pain.        Otherwise negative   Skin: Negative.    Allergic/Immunologic: Negative.    Neurological: Negative.    Hematological: Negative.    Psychiatric/Behavioral: Negative.        ALLERGIES:   Review of patient's allergies indicates:  No Known Allergies    MEDS:     Current Outpatient Medications:     ascorbic acid, vitamin C, (VITAMIN C) 1000 MG tablet, Take 1,000 mg by mouth once daily., Disp: , Rfl:     co-enzyme Q-10 30 mg capsule, Take 30 mg by mouth 3 (three) times daily., Disp: , Rfl:     cyanocobalamin (VITAMIN B-12) 1000 MCG tablet, Take 100 mcg by mouth once daily., Disp: , Rfl:     ferrous sulfate (FEOSOL) 325 mg (65 mg iron) Tab tablet, Take 1 tablet by mouth once daily., Disp: ,  Rfl: 2    folic acid (FOLVITE) 1 MG tablet, Take 1,000 mcg by mouth once daily., Disp: , Rfl: 2    losartan-hydrochlorothiazide 50-12.5 mg (HYZAAR) 50-12.5 mg per tablet, losartan 50 mg-hydrochlorothiazide 12.5 mg tablet   twice daily, Disp: , Rfl:     metFORMIN (GLUCOPHAGE-XR) 500 MG 24 hr tablet, Take 1,000 mg by mouth once daily. , Disp: , Rfl:     nystatin (MYCOSTATIN) cream, APPLY CREAM TOPICALLY TO AFFECTED AREA THREE TIMES DAILY, Disp: , Rfl: 0    omeprazole (PRILOSEC) 40 MG capsule, Take 1 capsule (40 mg total) by mouth every morning., Disp: 30 capsule, Rfl: 6    rosuvastatin (CRESTOR) 10 MG tablet, rosuvastatin 10 mg tablet   1 tablet every day by oral route., Disp: , Rfl:     trospium (SANCTURA) 20 mg Tab tablet, TAKE 1 TABLET BY MOUTH TWICE A DAY, Disp: 180 tablet, Rfl: 0    valACYclovir (VALTREX) 1000 MG tablet, Take 1,000 mg by mouth once daily. , Disp: , Rfl:     VASCULERA 630 mg Tab, Take 1 tablet by mouth once daily., Disp: , Rfl:     VITAMIN D2 50,000 unit capsule, , Disp: , Rfl: 0    acetaminophen (TYLENOL) 325 MG tablet, Take 1 tablet (325 mg total) by mouth every 6 (six) hours as needed for Pain., Disp: , Rfl:     etodolac (LODINE) 300 MG Cap, Take 1 capsule (300 mg total) by mouth 3 (three) times daily., Disp: 90 capsule, Rfl: 11  No current facility-administered medications for this visit.     Past Medical History:   Diagnosis Date    Diabetes mellitus, type 2     GERD (gastroesophageal reflux disease)     Gross hematuria 4/30/2019    H/O left breast biopsy     Hyperlipidemia     Hypertension     Kidney stone     Migraines     Nephrolithiasis 7/24/2018    Raynaud's disease     Renal calculus, right 11/23/2015    Renal colic on right side 11/23/2015    Right ureteral calculus 4/30/2019    Urinary tract infection     Vaginal infection        Past Surgical History:   Procedure Laterality Date    EXTRACORPOREAL SHOCK WAVE LITHOTRIPSY Right 8/9/2018    Procedure:  "LITHOTRIPSY, ESWL;  Surgeon: Milton Garland MD;  Location: [facility] OR;  Service: Urology;  Laterality: Right;    EXTRACORPOREAL SHOCK WAVE LITHOTRIPSY Right 5/2/2019    Procedure: LITHOTRIPSY, ESWL;  Surgeon: Milton Garland MD;  Location: [facility] OR;  Service: Urology;  Laterality: Right;    EXTRACORPOREAL SHOCK WAVE LITHOTRIPSY Right 7/25/2019    Procedure: LITHOTRIPSY, ESWL;  Surgeon: Milton Garland MD;  Location: [facility] OR;  Service: Urology;  Laterality: Right;    HYSTERECTOMY      Partial    LITHOTRIPSY      x 3 or 4 per pt       Family History   Problem Relation Age of Onset    Diabetes Mother     Hypertension Mother     Cancer Father     No Known Problems Brother     Migraines Daughter     No Known Problems Son     Hypertension Son     Kidney disease Neg Hx        Social History     Tobacco Use    Smoking status: Current Every Day Smoker     Packs/day: 0.00     Types: Cigarettes    Smokeless tobacco: Never Used    Tobacco comment: 4-5 cigarettes a day   Substance Use Topics    Alcohol use: Yes     Alcohol/week: 1.0 standard drinks     Types: 1 Standard drinks or equivalent per week     Comment: 'on occasion"    Drug use: No       Social History     Social History Narrative    Not on file       OBJECTIVE:   Vitals:    07/20/20 1303   BP: 128/82   BP Location: Left arm   Patient Position: Sitting   BP Method: Large (Manual)   Pulse: 84   Temp: 98.4 °F (36.9 °C)   TempSrc: Oral   SpO2: 97%   Weight: 120.2 kg (265 lb 1.6 oz)   Height: 5' 5" (1.651 m)     Body mass index is 44.11 kg/m².    Physical Exam  Constitutional:       General: She is not in acute distress.  Eyes:      General: No scleral icterus.     Extraocular Movements: Extraocular movements intact.      Conjunctiva/sclera: Conjunctivae normal.      Pupils: Pupils are equal, round, and reactive to light.   Neck:      Musculoskeletal: Neck supple.      Thyroid: No thyromegaly.      Vascular: No carotid bruit.   Cardiovascular:      Rate " and Rhythm: Normal rate and regular rhythm.      Pulses: Normal pulses.           Dorsalis pedis pulses are 2+ on the right side and 2+ on the left side.        Posterior tibial pulses are 2+ on the right side and 2+ on the left side.      Heart sounds: Normal heart sounds. No murmur. No friction rub. No gallop.    Pulmonary:      Effort: Pulmonary effort is normal.      Breath sounds: Normal breath sounds. No wheezing, rhonchi or rales.   Abdominal:      General: Bowel sounds are normal. There is no distension.      Palpations: Abdomen is soft.      Tenderness: There is no abdominal tenderness.   Musculoskeletal:        Arms:       Right lower leg: No edema.      Left lower leg: No edema.   Feet:      Right foot:      Protective Sensation: 5 sites tested. 5 sites sensed.      Skin integrity: No ulcer, blister or callus.      Left foot:      Protective Sensation: 5 sites tested. 5 sites sensed.      Skin integrity: No ulcer or callus.   Neurological:      Mental Status: She is alert.           Depression Patient Health Questionnaire 7/20/2020 3/11/2020 9/3/2019 6/17/2019 5/22/2019 9/24/2018 9/21/2018   Over the last two weeks how often have you been bothered by little interest or pleasure in doing things 0 0 0 0 0 0 0   Over the last two weeks how often have you been bothered by feeling down, depressed or hopeless 0 0 0 0 0 0 0   PHQ-2 Total Score 0 0 0 0 0 0 0       PERTINENT RESULTS:   BMP  Lab Results   Component Value Date     07/24/2019    K 4.0 07/24/2019     07/24/2019    CO2 32 (H) 07/24/2019    BUN 8 07/24/2019    CREATININE 0.74 07/24/2019    CALCIUM 9.5 07/24/2019    ANIONGAP 7 (L) 07/24/2019    ESTGFRAFRICA >60.0 07/24/2019    EGFRNONAA >60.0 07/24/2019     Lab Results   Component Value Date    ALT 16 04/28/2019    AST 16 04/28/2019    ALKPHOS 104 04/28/2019    BILITOT 0.1 04/28/2019     No results found for: CHOL  No results found for: HDL  No results found for: LDLCALC  No results found  for: TRIG  No results found for: CHOLHDL  Lab Results   Component Value Date    WBC 6.09 07/24/2019    HGB 12.0 07/24/2019    HCT 38.1 07/24/2019    MCV 85 07/24/2019     07/24/2019       No results found for: TSH, V1RWLUU, I2PQCWG, THYROIDAB, FREET4      ASSESSMENT/PLAN:  Problem List Items Addressed This Visit        Cardiac/Vascular    Hyperlipidemia    Current Assessment & Plan     No results found for: LDLCALC  - check lipids  - continue statin at this time         Hypertension    Current Assessment & Plan     - well controlled  - continue current medications            Renal/    History of benign breast tumor    Overview     - reports followed at [facility]  - s/p benign lesion removed left breast 2019         History of nephrolithiasis       Oncology    Benign lipomatous neoplasm of skin, subcu of right arm    Overview     - followed by Dr. Lacey  - US done in the past         Myelolipoma of left adrenal gland    Overview     - benign and followed by Dr. Arevalo         Myelolipoma of right adrenal gland    Overview     - benign and followed by Dr. Arevalo            Endocrine    Diabetes mellitus, type 2 - Primary    Current Assessment & Plan     - reports last A1c 6.5% 3/2020  - well controlled  - continue current medication         Relevant Orders    Comprehensive metabolic panel    Lipid Panel    Hemoglobin A1C    Microalbumin/creatinine urine ratio     DIABETES FOOT EXAM (Completed)    Hepatitis C Antibody    HIV 1/2 Ag/Ab (4th Gen)          ORDERS:   Orders Placed This Encounter    Pneumococcal Polysaccharide Vaccine (23 Valent) (SQ/IM)    Comprehensive metabolic panel    Lipid Panel    Hemoglobin A1C    Microalbumin/creatinine urine ratio    Hepatitis C Antibody    HIV 1/2 Ag/Ab (4th Gen)     DIABETES FOOT EXAM     HIV screening: discussed recommendations for HIV screening. Pt agreeable  Vaccines recommended: PPSV 23 today   - Tdap and Shingles pt reports that she will wait    Follow-up  in 2 months with labs or sooner if any concerns.    Dr. Verenice Chase D.O.   [facility]

## 2020-07-20 ENCOUNTER — OFFICE VISIT (OUTPATIENT)
Dept: FAMILY MEDICINE | Facility: CLINIC | Age: 60
End: 2020-07-20
Payer: COMMERCIAL

## 2020-07-20 VITALS
WEIGHT: 265.13 LBS | HEART RATE: 84 BPM | HEIGHT: 65 IN | DIASTOLIC BLOOD PRESSURE: 82 MMHG | BODY MASS INDEX: 44.17 KG/M2 | SYSTOLIC BLOOD PRESSURE: 128 MMHG | TEMPERATURE: 98 F | OXYGEN SATURATION: 97 %

## 2020-07-20 DIAGNOSIS — Z87.442 HISTORY OF NEPHROLITHIASIS: ICD-10-CM

## 2020-07-20 DIAGNOSIS — D17.21 BENIGN LIPOMATOUS NEOPLASM OF SKIN, SUBCU OF RIGHT ARM: ICD-10-CM

## 2020-07-20 DIAGNOSIS — D17.79 MYELOLIPOMA OF LEFT ADRENAL GLAND: ICD-10-CM

## 2020-07-20 DIAGNOSIS — I10 ESSENTIAL HYPERTENSION: ICD-10-CM

## 2020-07-20 DIAGNOSIS — D17.79 MYELOLIPOMA OF RIGHT ADRENAL GLAND: ICD-10-CM

## 2020-07-20 DIAGNOSIS — Z86.018 HISTORY OF BENIGN BREAST TUMOR: ICD-10-CM

## 2020-07-20 DIAGNOSIS — E11.9 TYPE 2 DIABETES MELLITUS WITHOUT COMPLICATION, WITHOUT LONG-TERM CURRENT USE OF INSULIN: Primary | ICD-10-CM

## 2020-07-20 DIAGNOSIS — E78.00 PURE HYPERCHOLESTEROLEMIA: ICD-10-CM

## 2020-07-20 PROCEDURE — 90471 IMMUNIZATION ADMIN: CPT | Mod: S$GLB,,, | Performed by: FAMILY MEDICINE

## 2020-07-20 PROCEDURE — 3079F PR MOST RECENT DIASTOLIC BLOOD PRESSURE 80-89 MM HG: ICD-10-PCS | Mod: CPTII,S$GLB,, | Performed by: FAMILY MEDICINE

## 2020-07-20 PROCEDURE — 3074F PR MOST RECENT SYSTOLIC BLOOD PRESSURE < 130 MM HG: ICD-10-PCS | Mod: CPTII,S$GLB,, | Performed by: FAMILY MEDICINE

## 2020-07-20 PROCEDURE — 99999 PR PBB SHADOW E&M-EST. PATIENT-LVL V: CPT | Mod: PBBFAC,,, | Performed by: FAMILY MEDICINE

## 2020-07-20 PROCEDURE — 99203 PR OFFICE/OUTPT VISIT, NEW, LEVL III, 30-44 MIN: ICD-10-PCS | Mod: 25,S$GLB,, | Performed by: FAMILY MEDICINE

## 2020-07-20 PROCEDURE — 90471 PNEUMOCOCCAL POLYSACCHARIDE VACCINE 23-VALENT =>2YO SQ IM: ICD-10-PCS | Mod: S$GLB,,, | Performed by: FAMILY MEDICINE

## 2020-07-20 PROCEDURE — 99203 OFFICE O/P NEW LOW 30 MIN: CPT | Mod: 25,S$GLB,, | Performed by: FAMILY MEDICINE

## 2020-07-20 PROCEDURE — 3074F SYST BP LT 130 MM HG: CPT | Mod: CPTII,S$GLB,, | Performed by: FAMILY MEDICINE

## 2020-07-20 PROCEDURE — 3008F BODY MASS INDEX DOCD: CPT | Mod: CPTII,S$GLB,, | Performed by: FAMILY MEDICINE

## 2020-07-20 PROCEDURE — 90732 PNEUMOCOCCAL POLYSACCHARIDE VACCINE 23-VALENT =>2YO SQ IM: ICD-10-PCS | Mod: S$GLB,,, | Performed by: FAMILY MEDICINE

## 2020-07-20 PROCEDURE — 90732 PPSV23 VACC 2 YRS+ SUBQ/IM: CPT | Mod: S$GLB,,, | Performed by: FAMILY MEDICINE

## 2020-07-20 PROCEDURE — 3079F DIAST BP 80-89 MM HG: CPT | Mod: CPTII,S$GLB,, | Performed by: FAMILY MEDICINE

## 2020-07-20 PROCEDURE — 99999 PR PBB SHADOW E&M-EST. PATIENT-LVL V: ICD-10-PCS | Mod: PBBFAC,,, | Performed by: FAMILY MEDICINE

## 2020-07-20 PROCEDURE — 3008F PR BODY MASS INDEX (BMI) DOCUMENTED: ICD-10-PCS | Mod: CPTII,S$GLB,, | Performed by: FAMILY MEDICINE

## 2020-07-20 RX ORDER — LANOLIN ALCOHOL/MO/W.PET/CERES
100 CREAM (GRAM) TOPICAL DAILY
COMMUNITY

## 2020-07-20 RX ORDER — ZOSTER VACCINE RECOMBINANT, ADJUVANTED 50 MCG/0.5
KIT INTRAMUSCULAR
Qty: 0.5 ML | Refills: 1 | Status: CANCELLED | OUTPATIENT
Start: 2020-07-20

## 2020-07-20 NOTE — PATIENT INSTRUCTIONS
1. I recommend the following vaccines that are given by your pharmacist:   - Shingles and Tetanus  - please ask for vaccine the next time your are at your pharmacy

## 2020-07-21 ENCOUNTER — PATIENT OUTREACH (OUTPATIENT)
Dept: ADMINISTRATIVE | Facility: HOSPITAL | Age: 60
End: 2020-07-21

## 2020-07-21 NOTE — LETTER
AUTHORIZATION FOR RELEASE OF   CONFIDENTIAL INFORMATION    Dear Dr. Garcia,    We are seeing Aminta Schreiber, date of birth 1960, in the clinic at [facility]. Verenice Chase DO is the patient's PCP. Aminta Schreiber has an outstanding lab/procedure at the time we reviewed her chart. In order to help keep her health information updated, she has authorized us to request the following medical record(s):              ( X )  EYE EXAM- Diabetic               Please fax records to us at [phone].     Attention: Sarah Alejandro     If you have any questions, please contact me at [phone].          Patient Name: Aminta Schreiber  : 1960  Patient Phone #: [phone]

## 2020-07-22 ENCOUNTER — PATIENT OUTREACH (OUTPATIENT)
Dept: ADMINISTRATIVE | Facility: HOSPITAL | Age: 60
End: 2020-07-22

## 2020-09-01 ENCOUNTER — TELEPHONE (OUTPATIENT)
Dept: UROLOGY | Facility: CLINIC | Age: 60
End: 2020-09-01

## 2020-09-01 NOTE — TELEPHONE ENCOUNTER
----- Message from Milton Garland MD sent at 8/31/2020  8:39 PM CDT -----  Ultrasound is normal reveals no stones.  Right adrenal myelolipoma appears to be stable.  Will repeat ultrasound 1 year.

## 2020-09-09 ENCOUNTER — OFFICE VISIT (OUTPATIENT)
Dept: UROLOGY | Facility: CLINIC | Age: 60
End: 2020-09-09
Payer: COMMERCIAL

## 2020-09-09 VITALS
TEMPERATURE: 98 F | DIASTOLIC BLOOD PRESSURE: 82 MMHG | SYSTOLIC BLOOD PRESSURE: 128 MMHG | OXYGEN SATURATION: 98 % | HEART RATE: 74 BPM | HEIGHT: 65 IN | BODY MASS INDEX: 44.15 KG/M2 | WEIGHT: 265 LBS

## 2020-09-09 DIAGNOSIS — S39.012A STRAIN OF LUMBAR REGION, INITIAL ENCOUNTER: ICD-10-CM

## 2020-09-09 DIAGNOSIS — M54.50 ACUTE BILATERAL LOW BACK PAIN WITHOUT SCIATICA: ICD-10-CM

## 2020-09-09 DIAGNOSIS — M47.817 SPONDYLOSIS WITHOUT MYELOPATHY OR RADICULOPATHY, LUMBOSACRAL REGION: ICD-10-CM

## 2020-09-09 PROCEDURE — 99999 PR PBB SHADOW E&M-EST. PATIENT-LVL III: ICD-10-PCS | Mod: PBBFAC,,, | Performed by: UROLOGY

## 2020-09-09 PROCEDURE — 3008F BODY MASS INDEX DOCD: CPT | Mod: CPTII,S$GLB,, | Performed by: UROLOGY

## 2020-09-09 PROCEDURE — 99214 PR OFFICE/OUTPT VISIT, EST, LEVL IV, 30-39 MIN: ICD-10-PCS | Mod: S$GLB,,, | Performed by: UROLOGY

## 2020-09-09 PROCEDURE — 3008F PR BODY MASS INDEX (BMI) DOCUMENTED: ICD-10-PCS | Mod: CPTII,S$GLB,, | Performed by: UROLOGY

## 2020-09-09 PROCEDURE — 99999 PR PBB SHADOW E&M-EST. PATIENT-LVL III: CPT | Mod: PBBFAC,,, | Performed by: UROLOGY

## 2020-09-09 PROCEDURE — 99214 OFFICE O/P EST MOD 30 MIN: CPT | Mod: S$GLB,,, | Performed by: UROLOGY

## 2020-09-09 RX ORDER — CYCLOBENZAPRINE HCL 10 MG
10 TABLET ORAL 3 TIMES DAILY PRN
Qty: 30 TABLET | Refills: 2 | Status: SHIPPED | OUTPATIENT
Start: 2020-09-09 | End: 2020-09-19

## 2020-09-09 NOTE — PROGRESS NOTES
Subjective:       Patient ID: Aminta Schreiber is a 60 y.o. female.    Chief Complaint: No chief complaint on file.    61 yo AAF with 1-2 week  History of Bilateral Lumbar Pain in low back.    Other  This is a new (Back Pain) problem. The current episode started 1 to 4 weeks ago. The problem occurs constantly. The problem has been waxing and waning. Pertinent negatives include no abdominal pain, anorexia, arthralgias, change in bowel habit, chest pain, chills, congestion, coughing, diaphoresis, fatigue, fever, headaches, joint swelling, myalgias, nausea, neck pain, numbness, rash, sore throat, swollen glands, urinary symptoms, vertigo, visual change, vomiting or weakness. Nothing aggravates the symptoms. She has tried nothing for the symptoms. The treatment provided significant relief.     Review of Systems   Constitutional: Negative for activity change, appetite change, chills, diaphoresis, fatigue and fever.   HENT: Negative for congestion, ear pain, hearing loss, nosebleeds, sinus pressure, sore throat and trouble swallowing.    Eyes: Negative for pain and visual disturbance.   Respiratory: Negative for apnea, cough and shortness of breath.    Cardiovascular: Negative for chest pain and leg swelling.   Gastrointestinal: Negative for abdominal distention, abdominal pain, anal bleeding, anorexia, blood in stool, change in bowel habit, constipation, diarrhea, nausea, rectal pain and vomiting.   Endocrine: Negative for cold intolerance, heat intolerance, polydipsia, polyphagia and polyuria.   Genitourinary: Negative for decreased urine volume, difficulty urinating, dyspareunia, dysuria, enuresis, flank pain, frequency, genital sores, hematuria, menstrual problem, pelvic pain, urgency, vaginal bleeding, vaginal discharge and vaginal pain.   Musculoskeletal: Negative for arthralgias, back pain, joint swelling, myalgias and neck pain.   Skin: Negative for color change, pallor and rash.   Allergic/Immunologic: Negative for  environmental allergies, food allergies and immunocompromised state.   Neurological: Negative for dizziness, vertigo, speech difficulty, weakness, numbness and headaches.   Hematological: Negative for adenopathy. Does not bruise/bleed easily.   Psychiatric/Behavioral: Negative.        Objective:      Physical Exam   Nursing note and vitals reviewed.  Constitutional: She is oriented to person, place, and time. She appears well-developed.  Non-toxic appearance. She does not appear ill. No distress.   HENT:   Head: Normocephalic and atraumatic.   Right Ear: External ear normal.   Left Ear: External ear normal.   Nose: Nose normal. No rhinorrhea or congestion.   Mouth/Throat: Oropharynx is clear.   Eyes: Conjunctivae are normal. Pupils are equal, round, and reactive to light. Right eye exhibits no discharge. Left eye exhibits no discharge. No scleral icterus.   Neck: Normal range of motion. Neck supple. No muscular tenderness present. Carotid bruit is not present. No neck rigidity.   Cardiovascular: Normal rate, regular rhythm, normal heart sounds and normal pulses.    Pulmonary/Chest: Effort normal and breath sounds normal. No stridor. No respiratory distress. She has no wheezes. She has no rhonchi. She has no rales. She exhibits no tenderness.   Abdominal: Soft. Normal appearance and bowel sounds are normal. She exhibits no distension and no mass. There is no abdominal tenderness. There is no rebound and no guarding. No hernia.   Musculoskeletal: Normal range of motion. No swelling, tenderness, deformity or signs of injury.      Right lower leg: No edema.      Left lower leg: No edema.   Lymphadenopathy:     She has no cervical adenopathy.   Neurological: She is alert and oriented to person, place, and time. She has normal reflexes. She displays no weakness and normal reflexes. No cranial nerve deficit or sensory deficit. Coordination and gait normal.   Skin: Skin is warm and dry. Capillary refill takes less than 2  seconds. No bruising, no lesion and no rash noted. She is not diaphoretic. No erythema. No jaundice or pallor.     Psychiatric: Her behavior is normal. Mood, judgment and thought content normal.       Assessment:       1. Spondylosis without myelopathy or radiculopathy, lumbosacral region        Plan:       Flexeril 2-3 times daily prn  Lodine 300 mg  Three times daily  Lumbar Series

## 2020-09-23 ENCOUNTER — PATIENT MESSAGE (OUTPATIENT)
Dept: UROLOGY | Facility: CLINIC | Age: 60
End: 2020-09-23

## 2020-09-24 ENCOUNTER — PATIENT MESSAGE (OUTPATIENT)
Dept: UROLOGY | Facility: CLINIC | Age: 60
End: 2020-09-24

## 2020-09-24 ENCOUNTER — TELEPHONE (OUTPATIENT)
Dept: UROLOGY | Facility: CLINIC | Age: 60
End: 2020-09-24

## 2020-09-24 NOTE — TELEPHONE ENCOUNTER
----- Message from Milton Garland MD sent at 9/24/2020  1:07 PM CDT -----  Regarding: RE: lumbar xray results  Five lumbar type vertebral bodies.  Multilevel marginal spurring.  Mild uniform loss of disc height L3-4 through L5-S1 levels.  Prominent facet arthropathy mildly accentuated secondary rotation on the lateral view.  Numerous calcifications identified in the left paraspinous location lower L-spine.  No acute fracture or subluxation.  No pars defects.  No subluxation or fracture.  Pedicles and SI joints intact.  Multiple calcifications within the lower pelvis.    Importantly there are no kidney stones or ureteral stones noted.  There is loss of disc height  ----- Message -----  From: Sofiya Melton MA  Sent: 9/24/2020  11:10 AM CDT  To: Milton Garland MD  Subject: lumbar xray results                              Patient would like to get the results of her lumbar disc xray.

## 2020-09-25 ENCOUNTER — PATIENT MESSAGE (OUTPATIENT)
Dept: UROLOGY | Facility: CLINIC | Age: 60
End: 2020-09-25

## 2020-09-25 DIAGNOSIS — M54.50 ACUTE BILATERAL LOW BACK PAIN WITHOUT SCIATICA: Primary | ICD-10-CM

## 2020-09-27 ENCOUNTER — PATIENT MESSAGE (OUTPATIENT)
Dept: FAMILY MEDICINE | Facility: CLINIC | Age: 60
End: 2020-09-27

## 2020-09-27 PROBLEM — S39.012A LUMBAR STRAIN: Status: RESOLVED | Noted: 2020-09-09 | Resolved: 2020-09-27

## 2020-09-27 PROBLEM — M47.816 LUMBAR SPONDYLOSIS: Status: ACTIVE | Noted: 2020-09-27

## 2020-09-28 ENCOUNTER — PATIENT MESSAGE (OUTPATIENT)
Dept: UROLOGY | Facility: CLINIC | Age: 60
End: 2020-09-28

## 2020-09-28 NOTE — TELEPHONE ENCOUNTER
Did she want to wait that long? If so, yes okay to use any 10:30 AM spots  Dr. Verenice Chase D.O.   Family Medicine

## 2020-10-13 ENCOUNTER — OFFICE VISIT (OUTPATIENT)
Dept: PAIN MEDICINE | Facility: CLINIC | Age: 60
End: 2020-10-13
Payer: COMMERCIAL

## 2020-10-13 VITALS
BODY MASS INDEX: 44.15 KG/M2 | HEIGHT: 65 IN | SYSTOLIC BLOOD PRESSURE: 133 MMHG | WEIGHT: 265 LBS | DIASTOLIC BLOOD PRESSURE: 73 MMHG | HEART RATE: 84 BPM

## 2020-10-13 DIAGNOSIS — M47.816 LUMBAR SPONDYLOSIS: ICD-10-CM

## 2020-10-13 DIAGNOSIS — M54.50 MIDLINE LOW BACK PAIN WITHOUT SCIATICA, UNSPECIFIED CHRONICITY: Primary | ICD-10-CM

## 2020-10-13 DIAGNOSIS — M51.36 DDD (DEGENERATIVE DISC DISEASE), LUMBAR: ICD-10-CM

## 2020-10-13 PROCEDURE — 99243 PR OFFICE CONSULTATION,LEVEL III: ICD-10-PCS | Mod: S$GLB,,, | Performed by: ANESTHESIOLOGY

## 2020-10-13 PROCEDURE — 99243 OFF/OP CNSLTJ NEW/EST LOW 30: CPT | Mod: S$GLB,,, | Performed by: ANESTHESIOLOGY

## 2020-10-13 PROCEDURE — 99999 PR PBB SHADOW E&M-EST. PATIENT-LVL V: CPT | Mod: PBBFAC,,, | Performed by: ANESTHESIOLOGY

## 2020-10-13 PROCEDURE — 99999 PR PBB SHADOW E&M-EST. PATIENT-LVL V: ICD-10-PCS | Mod: PBBFAC,,, | Performed by: ANESTHESIOLOGY

## 2020-10-13 NOTE — LETTER
October 13, 2020      Milton Garland MD  [address] [place] [address]  [address]  [facility] [address]           [facility]  [address]  [facility] [address]  Phone: [phone]  Fax: [phone]          Patient: Aminta Schreiber   MR Number: [id number]   YOB: 1960   Date of Visit: 10/13/2020       Dear Dr. Milton Garland:    Thank you for referring Aminta Schreiber to me for evaluation. Attached you will find relevant portions of my assessment and plan of care.    If you have questions, please do not hesitate to call me. I look forward to following Aminta Schreiber along with you.    Sincerely,    Jey Pina III, MD    Enclosure  CC:  No Recipients    If you would like to receive this communication electronically, please contact [email or web address] or [phone] to request more information on [facility] access.    For providers and/or their staff who would like to refer a patient to [facility], please contact us through our one-stop-shop provider referral line, [facility], [place], at [phone].    If you feel you have received this communication in error or would no longer like to receive these types of communications, please e-mail [email or web address]

## 2020-10-13 NOTE — PROGRESS NOTES
Chronic Pain - New Consult    Referring Physician: Milton Garland MD       Chief Complaint   Patient presents with    Back Pain        SUBJECTIVE:    Aminta Schreiber is a 61 y/o female with hx of kidney stones, HTN and diabetes who presents to the clinic for the evaluation of low back pain. The pain started approximately 7-8 weeks ago and symptoms have been persistent. No recent trauma or inciting event. The pain is located in the lower lumbar spine at the midline. There is no radiation.  The pain is described as aching, dull and throbbing and is rated as 5/10.  Symptoms interfere with daily activity and work. The pain is exacerbated by prolonged standing.  The pain is mitigated by massage and stretching. She has never tried physical therapy, chiropractic care or acupuncture.     Patient denies urinary incontinence, bowel incontinence, significant motor weakness and loss of sensations.    Physical Therapy/Home Exercise: no      Pain Disability Index Review:  No flowsheet data found.    Pain Medications:    - Lodine 300 mg as needed     report: Reviewed    Imaging:     XR LUMBAR SPINE COMPLETE 5 VIEW (9/17/2020):     CLINICAL HISTORY:  Lumbosacral osteoarthritis;Spondylosis without myelopathy or radiculopathy, lumbosacral region     TECHNIQUE:  AP, lateral, spot and bilateral oblique views of the lumbar spine were performed.     COMPARISON:  February 12 2008     FINDINGS:  Five lumbar type vertebral bodies.  Multilevel marginal spurring.  Mild uniform loss of disc height L3-4 through L5-S1 levels.  Prominent facet arthropathy mildly accentuated secondary rotation on the lateral view.  Numerous calcifications identified in the left paraspinous location lower L-spine.  No acute fracture or subluxation.  No pars defects.  No subluxation or fracture.  Pedicles and SI joints intact.  Multiple calcifications within the lower pelvis.    Past Medical History:   Diagnosis Date    Diabetes mellitus, type 2     GERD  "(gastroesophageal reflux disease)     Gross hematuria 4/30/2019    H/O left breast biopsy     Hyperlipidemia     Hypertension     Kidney stone     Migraines     Nephrolithiasis 7/24/2018    Raynaud's disease     Renal calculus, right 11/23/2015    Renal colic on right side 11/23/2015    Right ureteral calculus 4/30/2019    Urinary tract infection     Vaginal infection      Past Surgical History:   Procedure Laterality Date    EXTRACORPOREAL SHOCK WAVE LITHOTRIPSY Right 8/9/2018    Procedure: LITHOTRIPSY, ESWL;  Surgeon: Milton Garland MD;  Location: [facility];  Service: Urology;  Laterality: Right;    EXTRACORPOREAL SHOCK WAVE LITHOTRIPSY Right 5/2/2019    Procedure: LITHOTRIPSY, ESWL;  Surgeon: Milton Garland MD;  Location: [facility] OR;  Service: Urology;  Laterality: Right;    EXTRACORPOREAL SHOCK WAVE LITHOTRIPSY Right 7/25/2019    Procedure: LITHOTRIPSY, ESWL;  Surgeon: Milton Graland MD;  Location: [facility] OR;  Service: Urology;  Laterality: Right;    HYSTERECTOMY      Partial    LITHOTRIPSY      x 3 or 4 per pt     Social History     Socioeconomic History    Marital status:      Spouse name: Not on file    Number of children: 3    Years of education: Not on file    Highest education level: Not on file   Occupational History    Not on file   Social Needs    Financial resource strain: Not on file    Food insecurity     Worry: Not on file     Inability: Not on file    Transportation needs     Medical: Not on file     Non-medical: Not on file   Tobacco Use    Smoking status: Current Every Day Smoker     Packs/day: 0.00     Types: Cigarettes    Smokeless tobacco: Never Used    Tobacco comment: 4-5 cigarettes a day   Substance and Sexual Activity    Alcohol use: Yes     Alcohol/week: 1.0 standard drinks     Types: 1 Standard drinks or equivalent per week     Comment: 'on occasion"    Drug use: No    Sexual activity: Yes     Partners: Male     Birth control/protection: Surgical "   Lifestyle    Physical activity     Days per week: Not on file     Minutes per session: Not on file    Stress: Not on file   Relationships    Social connections     Talks on phone: Not on file     Gets together: Not on file     Attends Tenriism service: Not on file     Active member of club or organization: Not on file     Attends meetings of clubs or organizations: Not on file     Relationship status: Not on file   Other Topics Concern    Not on file   Social History Narrative    Not on file     Family History   Problem Relation Age of Onset    Diabetes Mother     Hypertension Mother     Cancer Father     No Known Problems Brother     Migraines Daughter     No Known Problems Son     Hypertension Son     Kidney disease Neg Hx        Review of patient's allergies indicates:  No Known Allergies    Current Outpatient Medications   Medication Sig    ascorbic acid, vitamin C, (VITAMIN C) 1000 MG tablet Take 1,000 mg by mouth once daily.    co-enzyme Q-10 30 mg capsule Take 30 mg by mouth 3 (three) times daily.    cyanocobalamin (VITAMIN B-12) 1000 MCG tablet Take 100 mcg by mouth once daily.    etodolac (LODINE) 300 MG Cap Take 1 capsule (300 mg total) by mouth 3 (three) times daily.    ferrous sulfate (FEOSOL) 325 mg (65 mg iron) Tab tablet Take 1 tablet by mouth once daily.    folic acid (FOLVITE) 1 MG tablet Take 1,000 mcg by mouth once daily.    losartan-hydrochlorothiazide 50-12.5 mg (HYZAAR) 50-12.5 mg per tablet losartan 50 mg-hydrochlorothiazide 12.5 mg tablet    twice daily    metFORMIN (GLUCOPHAGE-XR) 500 MG 24 hr tablet Take 1,000 mg by mouth once daily.     omeprazole (PRILOSEC) 40 MG capsule Take 1 capsule (40 mg total) by mouth every morning.    rosuvastatin (CRESTOR) 10 MG tablet rosuvastatin 10 mg tablet    1 tablet every day by oral route.    trospium (SANCTURA) 20 mg Tab tablet TAKE 1 TABLET BY MOUTH TWICE A DAY    valACYclovir (VALTREX) 1000 MG tablet Take 1,000 mg by mouth  "once daily.     VITAMIN D2 50,000 unit capsule      No current facility-administered medications for this visit.        REVIEW OF SYSTEMS:  GENERAL: No weight loss, malaise or fevers.  HEENT: Negative for frequent or significant headaches.  RESPIRATORY: Negative for wheezing or shortness of breath.  CARDIOVASCULAR: Negative for chest pain or palpitations.  GI: No blood in stools or black stools or change in bowel habits.  : Negative for urinary tract infections or incontinence. + hx of kidney stones.  MUSCULOSKELETAL: See HPI  SKIN: Negative for rash or itching.  PSYCH: Negative for sleep disturbance, mood disorder and recent psychosocial stressors.    HEMATOLOGY/LYMPHOLOGY Negative for prolonged bleeding, bruising easily or swollen nodes.  NEURO:  No history of seizures or tremors.    OBJECTIVE:    /73   Pulse 84   Ht 5' 5" (1.651 m)   Wt 120.2 kg (265 lb)   BMI 44.10 kg/m²     PHYSICAL EXAMINATION:  GENERAL: Well appearing, in no acute distress.  PSYCH:  Mood and affect is appropriate.  Awake, alert, and oriented x 3.  SKIN: Skin color, texture, turgor normal, no rashes or lesions  HEENT: Normocephalic, atraumatic.  EOM intact.  CV: Radial pulses are 2+.  RESP:  Respirations are unlabored.  GI: Abdomen soft and non-tender.  MSK:  No atrophy or tone abnormalities are noted.      Neck: No pain with neck flexion, extension, or lateral rotation.  No obvious deformity or signs of trauma.  Normal cervical spine range of motion.    Back: ROM is mildly limited on flexion and extension. Straight leg raising is negative for radicular pain. Tenderness to palpation over the lumbar spine at the midline.  Negative for pain with facet loading and back extension/rotation.     Buttocks:  No pain to palpation over the PSIS. Sacroiliac joint maneuvers are negative for pain.     Extremities:  Peripheral joint ROM is full and pain free without obvious instability or laxity in all four extremities. No edema or skin " discolorations noted.     Gait:  Gait is normal    NEUR:  Bilateral lower extremity coordination and muscle stretch reflexes are physiologic and symmetric.  No loss of sensation is noted.     Strength testing:    Right hip flexion: 5/5  Left hip flexion: 5/5  Right knee extension: 5/5  Left knee extension: 5/5  Right knee flexion: 5/5  Left knee flexion: 5/5  Right ankle dorsiflexion: 5/5  Left ankle dorsiflexion: 5/5        ASSESSMENT:     1. Midline low back pain without sciatica, unspecified chronicity    2. Lumbar spondylosis    3. DDD (degenerative disc disease), lumbar          PLAN:     - I have stressed the importance of physical activity and a home exercise plan to help with pain and improve health.  - Referral to Physical therapy for lumbar stabilization, core strengthening, and a home exercise program.  - Continue Lodine as needed for pain.  - Will obtain MRI of lumbar spine if no improvement with above.  - Counseled patient regarding the importance of smoking cessation.  - RTC in 5 weeks.      The above plan and management options were discussed at length with patient. Patient is in agreement with the above and verbalized understanding. It will be communicated with the referring physician via electronic record, fax, or mail.    Jey Pina III  10/13/2020

## 2020-10-15 NOTE — TELEPHONE ENCOUNTER
----- Message from Chiqui Bee sent at 1/18/2019 12:47 PM CST -----  Contact: [phone]/ self  Patient requesting to speak with you as she has questions regarding out of pocket cost for new medication that was prescribed. Pt stated insurance company provider 2 alternatives. Please call to further discuss and advise.     1. trospium er 60MG  2. tolterodine er 4MG    Chief complaint:   Chief Complaint   Patient presents with   • Diabetes       Vitals:  Visit Vitals  /70   Pulse 64   Resp 18   Ht 5' 8\" (1.727 m)   Wt (!) 155 kg   SpO2 98%   BMI 51.96 kg/m²       HISTORY OF PRESENT ILLNESS     HPI  Very pleasant patient comes in for follow-up recently diagnosed with lung nodule status post robotic surgery found to have localized nonsquamous cell cancer stage I nodes negative per reports.  He has my monitored closely by his hematologist oncologist.  Overall doing well has follow-up visit scheduled.    With his days well controlled will check blood pressure looks good continue to monitor cholesterol will check with his weight diet exercise courage he is not yet at goal strongly recommend exercise and weight loss.  Other significant problems:  Patient Active Problem List    Diagnosis Date Noted   • S/P robotic right upper lobectomy of lung 09/22/2020     Priority: Medium   • Anticoagulation management encounter 05/04/2019     Priority: Medium   • Non-small cell cancer of right lung (CMS/HCC) 09/01/2020     Priority: Low     Indication for Admission:  Patient underwent CT lung screening on 7/27/2020 which revealed a growing lobulated 14 mm right upper lobe nodule as compared to previous 6 mm. Moderate coronary calcification. Follow up PET scan on 8/11/2020 demonstrated an enlarging right upper lobe pulmonary nodule is hypermetabolic, highly suspicious for primary pulmonary malignancy. No hypermetabolic lymphadenopathy or evidence for distant FDG avid disease. Patient went on to have an CT guided needle biopsy on 8/25/2020 which confirmed squamous cell carcinoma of the right upper lobe. PFTs adequate for resection.  PET also demonstrated 1.8 cm right renal lesion with density greater than would be expected for simple cyst and with possible uptake versus misregistered uptake from adjacent excreted renal activity. Renal US completed showing no acute renal abnormality with 2.0 cm  right renal cyst.         • Chest pain 06/10/2020     Priority: Low   • Morbid obesity with BMI of 45.0-49.9, adult (CMS/[facility]) 11/19/2019     Priority: Low   • History of cardiovascular stress test 11/19/2019     Priority: Low     2019: [place]  Stress Test 2/18/2019:  CONCLUSION:  This stress test is within normal limits and is negative for ischemia.  At the time of this dictation, the Cardiolite portion is still pending.  Correlation with nuclear scan is recommended.  Nuclear Portion:  CONCLUSION:  1.  No evidence of Regadenoson-induced ischemia.  2.  No evidence of previous myocardial injury pattern.  3.  Normal left ventricular size with satisfactory resting systolic performance.     • Kidney stones 11/19/2019     Priority: Low     Gaitan 2019, [email or web address]     • Acute gastric ulcer with hemorrhage 08/31/2017     Priority: Low   • History of tobacco use 08/02/2017     Priority: Low     Quit 2016       • Bilateral pulmonary embolism (CMS/[facility]) 09/22/2016     Priority: Low     2016:  Dyspnea worsened and he returned to the emergency room on September 22, 2016. That, he had a CT angiogram of the chest per PE protocol.   imaging studies with CT angiogram per PE protocol noted extensive acute bilateral pulmonary emboli, thrombus burden is moderate to high most notable within the right upper lobe. Small foci of ischemia suspected within the right upper lobe with tiny groundglass attenuation. Wedge-shaped infiltrate extending to the pleura.  2019 see [email or web address]     • Pain of right heel 08/09/2016     Priority: Low   • Acute back pain 11/03/2015     Priority: Low   • Lump in armpit 11/03/2015     Priority: Low   • Type II or unspecified type diabetes mellitus without mention of complication, not stated as uncontrolled 02/11/2015     Priority: Low   • Left knee pain 06/26/2014     Priority: Low   • Sleep apnea syndrome      Priority: Low   • Hyperlipidemia      Priority: Low   • Bronchial asthma      Priority: Low       PAST MEDICAL,  FAMILY AND SOCIAL HISTORY     Medications:  Current Outpatient Medications   Medication   • pravastatin (PRAVACHOL) 20 MG tablet   • pantoprazole (PROTONIX) 40 MG tablet   • Cholecalciferol (D3 ADULT PO)   • acetaminophen (TYLENOL) 500 MG tablet   • senna (Senokot) 8.6 MG tablet   • tiotropium (SPIRIVA HANDIHALER) 18 MCG capsule for inhaler   • glycerin Liquid   • polyethylene glycol (MIRALAX) 17 g packet   • metFORMIN (GLUCOPHAGE) 500 MG tablet   • valsartan-hydroCHLOROthiazide (DIOVAN-HCT) 160-12.5 MG per tablet   • blood glucose test strip   • amLODIPine (NORVASC) 10 MG tablet   • Blood Glucose Monitoring Suppl w/Device Kit   • rivaroxaban (XARELTO) 10 MG Tab   • sucralfate (CARAFATE) 1 g tablet   • beclomethasone diprop HFA (QVAR REDIHALER) 80 MCG/ACT inhaler   • albuterol (VENTOLIN) (2.5 MG/3ML) 0.083% nebulizer solution   • docusate sodium (COLACE) 100 MG capsule   • Respiratory Therapy Supplies (NEBULIZER) Device   • PROAIR  (90 BASE) MCG/ACT inhaler   • Multiple Vitamins-Minerals (MULTIVITAMIN ADULT PO)   • Blood Pressure Monitor KIT   • tiotropium (SPIRIVA HANDIHALER) 18 MCG capsule for inhaler     No current facility-administered medications for this visit.        Allergies:  ALLERGIES:  No Known Allergies    Past Medical  History/Surgeries:  Past Medical History:   Diagnosis Date   • Arthritis    • Blood clot associated with vein wall inflammation    • Bronchial asthma    • Chronic kidney disease     cyst    • Diabetes mellitus (CMS/HCC)    • Essential (primary) hypertension    • Gastroesophageal reflux disease    • History of stomach ulcers    • Hyperlipidemia    • Malignant neoplasm (CMS/HCC)     Small cell Lung cancer   • Obesity    • S/P robotic right upper lobectomy of lung 9/22/2020   • Sinusitis, chronic    • Sleep apnea syndrome    • Wears glasses        Past Surgical History:   Procedure Laterality Date   • Esophagogastroduodenoscopy     • [place] lung biopsy Right 08/25/2020   • Lung lobectomy  Right 2020    s/p robotic right upper lobectomy   • [place] tooth extraction         Family History:  Family History   Problem Relation Age of Onset   • Heart disease Father    • COPD Brother    • COPD Brother    • Cancer Mother        Social History:  Social History     Tobacco Use   • Smoking status: Former Smoker     Packs/day: 1.00     Years: 30.00     Pack years: 30.00     Types: Cigars     Quit date: 2016     Years since quittin.0   • Smokeless tobacco: Never Used   • Tobacco comment: Quit smoking 16   Substance Use Topics   • Alcohol use: Yes     Alcohol/week: 0.0 standard drinks     Frequency: Never     Drinks per session: 1 or 2     Binge frequency: Never     Comment: socially       REVIEW OF SYSTEMS     Review of Systems   Constitutional: Negative for chills, fever and unexpected weight change.   Respiratory: Negative for shortness of breath.    Cardiovascular: Negative for chest pain.   Gastrointestinal: Negative for abdominal pain.       PHYSICAL EXAM     Physical Exam  Constitutional:       Appearance: He is well-developed.   Cardiovascular:      Rate and Rhythm: Normal rate and regular rhythm.      Heart sounds: Normal heart sounds. No murmur. No friction rub. No gallop.    Pulmonary:      Effort: Pulmonary effort is normal. No respiratory distress.      Breath sounds: Normal breath sounds. No wheezing or rales.   Chest:      Chest wall: No tenderness.   Abdominal:      General: Bowel sounds are normal. There is no distension.      Palpations: Abdomen is soft.      Tenderness: There is no abdominal tenderness. There is no guarding or rebound.         ASSESSMENT/PLAN     Please note that all pertinent pmh/psh/allergies/meds/and family history were reviewed as pertinent to this visit.  Also, reviewed patient's last labs and prior notes.    Health maintenance was also updated and reviewed, with the following items listed as DUE and offered to the patient:    Health Maintenance Due    Topic Date Due   • Shingles Vaccine (1 of 2) 05/07/2008   • Diabetes Eye Exam  05/01/2020   • Influenza Vaccine (1) 09/01/2020   • Diabetes A1C  09/06/2020   • Diabetes Foot Exam  11/19/2020       Information for our patients: Pertinent diagnosis that effect your health, are being monitored, and/or were reviewed or addressed today are listed below. (These are not necessarily listed in order of importance) Any active issues are usually listed with updates/monitoring. Often chronic issues that didn't need to be fully discussed may also listed to assist in appropriate monitoring and follow up. Dr. Valdes carefully follows all your medical conditions and reviews notes and communications from your specialists!    1. Non-small cell cancer of right lung (CMS/HCC)  Close follow-up with specialist recommended will monitor    2. Type 2 diabetes mellitus without complication, without long-term current use of insulin (CMS/HCC)  Check labs adjust as appropriate  - CBC WITH DIFFERENTIAL; Future  - COMPREHENSIVE METABOLIC PANEL; Future  - GLYCOHEMOGLOBIN; Future  - LIPID PANEL WITH REFLEX; Future  - MICROALBUMIN URINE RANDOM; Future  - CBC WITH DIFFERENTIAL; Future  - COMPREHENSIVE METABOLIC PANEL; Future  - GLYCOHEMOGLOBIN; Future  - LIPID PANEL WITH REFLEX; Future  - MICROALBUMIN URINE RANDOM; Future    3. Influenza vaccine needed  Check labs  - INFLUENZA QUADRIVALENT SPLIT PRES FREE 0.5 ML VACC, IM (FLULAVAL,FLUARIX,FLUZONE)    4. S/P robotic right upper lobectomy of lung  Continue to monitor lungs sound good    5. Anticoagulation management encounter  Remains anticoagulated on long-term medications    6. Bilateral pulmonary embolism (CMS/HCC)  History of bilateral PE again on long-term anticoagulation does complains of some mild lower extremity edema offered lower extremity Dopplers he declines and again is anticoagulated at this time    Recommended verify Xarelto dosing with his hematologist oncologist looks like he is on  10 mg a day typical dosing would be 20 mg a day.    7. Morbid obesity with BMI of 45.0-49.9, adult (CMS/[facility])  Exercise and weight loss encouraged  - CBC WITH DIFFERENTIAL; Future  - COMPREHENSIVE METABOLIC PANEL; Future  - GLYCOHEMOGLOBIN; Future  - LIPID PANEL WITH REFLEX; Future  - MICROALBUMIN URINE RANDOM; Future  - CBC WITH DIFFERENTIAL; Future  - COMPREHENSIVE METABOLIC PANEL; Future  - GLYCOHEMOGLOBIN; Future  - LIPID PANEL WITH REFLEX; Future  - MICROALBUMIN URINE RANDOM; Future    8. Hyperlipidemia, unspecified hyperlipidemia type  History of high cholesterol check  - CBC WITH DIFFERENTIAL; Future  - COMPREHENSIVE METABOLIC PANEL; Future  - GLYCOHEMOGLOBIN; Future  - LIPID PANEL WITH REFLEX; Future  - MICROALBUMIN URINE RANDOM; Future  - CBC WITH DIFFERENTIAL; Future  - COMPREHENSIVE METABOLIC PANEL; Future  - GLYCOHEMOGLOBIN; Future  - LIPID PANEL WITH REFLEX; Future  - MICROALBUMIN URINE RANDOM; Future    9. Obstructive sleep apnea syndrome  Exercise and weight loss encouraged      Follow-up in 6 months

## 2020-10-16 PROBLEM — Z78.9 IMPAIRED MOBILITY AND ACTIVITIES OF DAILY LIVING: Status: ACTIVE | Noted: 2020-10-16

## 2020-10-16 PROBLEM — Z74.09 IMPAIRED MOBILITY AND ACTIVITIES OF DAILY LIVING: Status: ACTIVE | Noted: 2020-10-16

## 2020-10-19 ENCOUNTER — OFFICE VISIT (OUTPATIENT)
Dept: FAMILY MEDICINE | Facility: CLINIC | Age: 60
End: 2020-10-19
Payer: COMMERCIAL

## 2020-10-19 VITALS
HEART RATE: 84 BPM | DIASTOLIC BLOOD PRESSURE: 80 MMHG | HEIGHT: 65 IN | TEMPERATURE: 98 F | SYSTOLIC BLOOD PRESSURE: 122 MMHG | WEIGHT: 272.38 LBS | RESPIRATION RATE: 18 BRPM | BODY MASS INDEX: 45.38 KG/M2 | OXYGEN SATURATION: 98 %

## 2020-10-19 DIAGNOSIS — E11.9 TYPE 2 DIABETES MELLITUS WITHOUT COMPLICATION, WITHOUT LONG-TERM CURRENT USE OF INSULIN: Primary | ICD-10-CM

## 2020-10-19 DIAGNOSIS — I10 ESSENTIAL HYPERTENSION: ICD-10-CM

## 2020-10-19 DIAGNOSIS — E55.9 VITAMIN D DEFICIENCY: ICD-10-CM

## 2020-10-19 DIAGNOSIS — E78.00 PURE HYPERCHOLESTEROLEMIA: ICD-10-CM

## 2020-10-19 DIAGNOSIS — B34.9 VIRAL ILLNESS: ICD-10-CM

## 2020-10-19 PROBLEM — M54.50 ACUTE BILATERAL LOW BACK PAIN WITHOUT SCIATICA: Status: RESOLVED | Noted: 2020-09-09 | Resolved: 2020-10-19

## 2020-10-19 PROCEDURE — 3008F PR BODY MASS INDEX (BMI) DOCUMENTED: ICD-10-PCS | Mod: CPTII,S$GLB,, | Performed by: FAMILY MEDICINE

## 2020-10-19 PROCEDURE — 99214 PR OFFICE/OUTPT VISIT, EST, LEVL IV, 30-39 MIN: ICD-10-PCS | Mod: S$GLB,,, | Performed by: FAMILY MEDICINE

## 2020-10-19 PROCEDURE — 99999 PR PBB SHADOW E&M-EST. PATIENT-LVL V: ICD-10-PCS | Mod: PBBFAC,,, | Performed by: FAMILY MEDICINE

## 2020-10-19 PROCEDURE — 3044F PR MOST RECENT HEMOGLOBIN A1C LEVEL <7.0%: ICD-10-PCS | Mod: CPTII,S$GLB,, | Performed by: FAMILY MEDICINE

## 2020-10-19 PROCEDURE — 3079F PR MOST RECENT DIASTOLIC BLOOD PRESSURE 80-89 MM HG: ICD-10-PCS | Mod: CPTII,S$GLB,, | Performed by: FAMILY MEDICINE

## 2020-10-19 PROCEDURE — U0003 INFECTIOUS AGENT DETECTION BY NUCLEIC ACID (DNA OR RNA); SEVERE ACUTE RESPIRATORY SYNDROME CORONAVIRUS 2 (SARS-COV-2) (CORONAVIRUS DISEASE [COVID-19]), AMPLIFIED PROBE TECHNIQUE, MAKING USE OF HIGH THROUGHPUT TECHNOLOGIES AS DESCRIBED BY CMS-2020-01-R: HCPCS

## 2020-10-19 PROCEDURE — 3044F HG A1C LEVEL LT 7.0%: CPT | Mod: CPTII,S$GLB,, | Performed by: FAMILY MEDICINE

## 2020-10-19 PROCEDURE — 3074F PR MOST RECENT SYSTOLIC BLOOD PRESSURE < 130 MM HG: ICD-10-PCS | Mod: CPTII,S$GLB,, | Performed by: FAMILY MEDICINE

## 2020-10-19 PROCEDURE — 3079F DIAST BP 80-89 MM HG: CPT | Mod: CPTII,S$GLB,, | Performed by: FAMILY MEDICINE

## 2020-10-19 PROCEDURE — 3074F SYST BP LT 130 MM HG: CPT | Mod: CPTII,S$GLB,, | Performed by: FAMILY MEDICINE

## 2020-10-19 PROCEDURE — 3008F BODY MASS INDEX DOCD: CPT | Mod: CPTII,S$GLB,, | Performed by: FAMILY MEDICINE

## 2020-10-19 PROCEDURE — 99999 PR PBB SHADOW E&M-EST. PATIENT-LVL V: CPT | Mod: PBBFAC,,, | Performed by: FAMILY MEDICINE

## 2020-10-19 PROCEDURE — 99214 OFFICE O/P EST MOD 30 MIN: CPT | Mod: S$GLB,,, | Performed by: FAMILY MEDICINE

## 2020-10-19 RX ORDER — METFORMIN HYDROCHLORIDE 500 MG/1
1000 TABLET, EXTENDED RELEASE ORAL DAILY
Qty: 180 TABLET | Refills: 2 | Status: SHIPPED | OUTPATIENT
Start: 2020-10-19 | End: 2021-03-29 | Stop reason: SDUPTHER

## 2020-10-19 RX ORDER — VIT C/E/ZN/COPPR/LUTEIN/ZEAXAN 250MG-90MG
1000 CAPSULE ORAL DAILY
Qty: 90 CAPSULE | Refills: 11
Start: 2020-10-19 | End: 2021-03-29

## 2020-10-19 RX ORDER — ZOSTER VACCINE RECOMBINANT, ADJUVANTED 50 MCG/0.5
KIT INTRAMUSCULAR
Qty: 1 EACH | Refills: 1 | Status: SHIPPED | OUTPATIENT
Start: 2020-10-19 | End: 2021-06-09

## 2020-10-19 RX ORDER — FOLIC ACID 1 MG/1
1000 TABLET ORAL DAILY
Qty: 90 TABLET | Refills: 2 | Status: SHIPPED | OUTPATIENT
Start: 2020-10-19 | End: 2021-03-29 | Stop reason: SDUPTHER

## 2020-10-19 NOTE — PROGRESS NOTES
FAMILY MEDICINE  [facility]    Patient Active Problem List   Diagnosis    Diabetes mellitus, type 2    GERD (gastroesophageal reflux disease)    Hyperlipidemia    Hypertension    Raynaud's disease    Myelolipoma of left adrenal gland    Myelolipoma of right adrenal gland    Overactive bladder    Chronic midline low back pain without sciatica    History of nephrolithiasis    Benign lipomatous neoplasm of skin, subcu of right arm    History of benign breast tumor    Lumbar spondylosis    Impaired mobility and activities of daily living    Vitamin D deficiency    Viral illness       CC:   Chief Complaint   Patient presents with    Follow-up    Medication Refill       HPI: Aminta Schreiber is a 60 y.o. female  Has Obesity, Diabetes mellitus, type 2; GERD (gastroesophageal reflux disease); Hyperlipidemia; Hypertension; Raynaud's disease; Myelolipoma of left adrenal gland; Myelolipoma of right adrenal gland; Overactive bladder; Chronic midline low back pain without sciatica; and History of nephrolithiasis on their problem list.  - presents to follow-up labs    Urology Dada (nephrolithiasis)  Rheumatology: Dr. Live Lacey [facility]  Endocrine: Dr. Scott Arevalo (monitor adrenals and no issues)  Gynecology Dr. Vero Sanz     Reports that 10/16/2020 had a fever 100.7F and 1 day of fever and last 10/17/2020 12PM 100 F with some cough and post-nasal drip. She has not been tested for covid-19. She is feeling better and will need excuse to return to work but still reports cough and thinks it is just her sinuses     1. Diabetes Type 2    Age diagnosed: 50's    Current treatment regimen:   metFORMIN (GLUCOPHAGE-XR) 500 MG 24 hr tablet, Take 1,000 mg by mouth once daily. , Disp: , Rfl:     Side effects from treatment: denies  Complications of diabetes: none    Glucometer: yes  Glucose monitoring: fasting daily or s/p dinner  A. Fastin mg/dL  7day: NA 14 day: NA 30 day: NA    Lab Results       Component                 Value               Date                       HGBA1C                   6.2 (H)             09/25/2020              3/2020 (Dr. Lance) 6.5%    Low dose statin: yes  Last eye exam: Dr. Mallorie Garcia [facility] (pending and reports pushed back due to Covid-19)    Last foot exam: 7/20/2020     Vaccines:   Influenza: due each fall and wait until illness resolved  Pneumovax: 23 7/20/2020  Prevnar 13: NA    2. Hypertension  - DM2, HLD  - BP goal < 140/90    Current medication treatment:   losartan-hydrochlorothiazide 50-12.5 mg (HYZAAR) 50-12.5 mg per tablet, losartan 50 mg-hydrochlorothiazide 12.5 mg tablet  twice daily, Disp: , Rfl:     Medication side effects: denies           HEALTH MAINTENANCE:   Health Maintenance   Topic Date Due    TETANUS VACCINE  06[birth year]14/1978    Eye Exam  04/08/2020    Hemoglobin A1c  03/25/2021    Lipid Panel  09/25/2021    Low Dose Statin  10/19/2021    Foot Exam  10/19/2021    Mammogram  03/16/2022    Hepatitis C Screening  Completed    Pneumococcal Vaccine (Medium Risk)  Completed     Health Maintenance Topics with due status: Not Due       Topic Last Completion Date    Colorectal Cancer Screening 04/14/2017    Mammogram 03/16/2020    Lipid Panel 09/25/2020    Hemoglobin A1c 09/25/2020    Low Dose Statin 10/19/2020    Foot Exam 10/19/2020     Health Maintenance Due   Topic Date Due    TETANUS VACCINE  06[birth year]14/1977    Shingles Vaccine (1 of 2) 06/14/2009    Eye Exam  04/08/2020    Influenza Vaccine (1) 08/01/2020       ROS: Review of Systems   Constitutional: Positive for fever.        Otherwise negative   HENT: Positive for congestion and postnasal drip.         Otherwise negative   Eyes: Negative.    Respiratory: Positive for cough.         Otherwise negative   Cardiovascular: Negative.    Gastrointestinal: Negative.    Endocrine: Negative.    Genitourinary: Negative.    Musculoskeletal: Positive for arthralgias.        Otherwise negative   Skin: Negative.     Allergic/Immunologic: Negative.    Neurological: Negative.    Hematological: Negative.    Psychiatric/Behavioral: Negative.        ALLERGIES:   Review of patient's allergies indicates:  No Known Allergies    MEDS:     Current Outpatient Medications:     ascorbic acid, vitamin C, (VITAMIN C) 1000 MG tablet, Take 1,000 mg by mouth once daily., Disp: , Rfl:     co-enzyme Q-10 30 mg capsule, Take 30 mg by mouth 3 (three) times daily., Disp: , Rfl:     cyanocobalamin (VITAMIN B-12) 1000 MCG tablet, Take 100 mcg by mouth once daily., Disp: , Rfl:     etodolac (LODINE) 300 MG Cap, Take 1 capsule (300 mg total) by mouth 3 (three) times daily., Disp: 90 capsule, Rfl: 11    ferrous sulfate (FEOSOL) 325 mg (65 mg iron) Tab tablet, Take 1 tablet by mouth once daily., Disp: , Rfl: 2    folic acid (FOLVITE) 1 MG tablet, Take 1 tablet (1,000 mcg total) by mouth once daily., Disp: 90 tablet, Rfl: 2    losartan-hydrochlorothiazide 50-12.5 mg (HYZAAR) 50-12.5 mg per tablet, losartan 50 mg-hydrochlorothiazide 12.5 mg tablet   twice daily, Disp: , Rfl:     metFORMIN (GLUCOPHAGE-XR) 500 MG ER 24hr tablet, Take 2 tablets (1,000 mg total) by mouth once daily., Disp: 180 tablet, Rfl: 2    omeprazole (PRILOSEC) 40 MG capsule, Take 1 capsule (40 mg total) by mouth every morning., Disp: 30 capsule, Rfl: 6    rosuvastatin (CRESTOR) 10 MG tablet, rosuvastatin 10 mg tablet   1 tablet every day by oral route., Disp: , Rfl:     trospium (SANCTURA) 20 mg Tab tablet, TAKE 1 TABLET BY MOUTH TWICE A DAY, Disp: 180 tablet, Rfl: 0    valACYclovir (VALTREX) 1000 MG tablet, Take 1,000 mg by mouth once daily. , Disp: , Rfl:     cholecalciferol, vitamin D3, (VITAMIN D3) 25 mcg (1,000 unit) capsule, Take 1 capsule (1,000 Units total) by mouth once daily., Disp: 90 capsule, Rfl: 11    DIPH,PERTUSS,ACEL,,TET VAC,PF, ADULT (ADACEL) 2 Lf-(2.5-5-3-5 mcg)-5Lf/0.5 mL Syrg, Inject 0.5 mLs into the muscle once. for 1 dose, Disp: 0.5 mL, Rfl: 0     "varicella-zoster gE-AS01B, PF, (SHINGRIX, PF,) 50 mcg/0.5 mL injection, Inject 0.5mL IM at 0 and 2-6 months, Disp: 1 each, Rfl: 1    Past Medical History:   Diagnosis Date    Diabetes mellitus, type 2     GERD (gastroesophageal reflux disease)     Gross hematuria 4/30/2019    H/O left breast biopsy     Hyperlipidemia     Hypertension     Kidney stone     Migraines     Nephrolithiasis 7/24/2018    Raynaud's disease     Renal calculus, right 11/23/2015    Renal colic on right side 11/23/2015    Right ureteral calculus 4/30/2019    Urinary tract infection     Vaginal infection        Past Surgical History:   Procedure Laterality Date    EXTRACORPOREAL SHOCK WAVE LITHOTRIPSY Right 8/9/2018    Procedure: LITHOTRIPSY, ESWL;  Surgeon: Milton Garland MD;  Location: [facility] OR;  Service: Urology;  Laterality: Right;    EXTRACORPOREAL SHOCK WAVE LITHOTRIPSY Right 5/2/2019    Procedure: LITHOTRIPSY, ESWL;  Surgeon: Milton Garland MD;  Location: [facility] OR;  Service: Urology;  Laterality: Right;    EXTRACORPOREAL SHOCK WAVE LITHOTRIPSY Right 7/25/2019    Procedure: LITHOTRIPSY, ESWL;  Surgeon: Milton Garland MD;  Location: [facility] OR;  Service: Urology;  Laterality: Right;    HYSTERECTOMY      Partial    LITHOTRIPSY      x 3 or 4 per pt       Family History   Problem Relation Age of Onset    Diabetes Mother     Hypertension Mother     Cancer Father     No Known Problems Brother     Migraines Daughter     No Known Problems Son     Hypertension Son     Kidney disease Neg Hx        Social History     Tobacco Use    Smoking status: Current Every Day Smoker     Packs/day: 0.00     Types: Cigarettes    Smokeless tobacco: Never Used    Tobacco comment: 4-5 cigarettes a day   Substance Use Topics    Alcohol use: Yes     Alcohol/week: 1.0 standard drinks     Types: 1 Standard drinks or equivalent per week     Comment: 'on occasion"    Drug use: No       Social History     Social History Narrative    Not on " "file       OBJECTIVE:   Vitals:    10/19/20 1037   BP: 122/80   BP Location: Left arm   Patient Position: Sitting   BP Method: X-Large (Manual)   Pulse: 84   Resp: 18   Temp: 97.5 °F (36.4 °C)   TempSrc: Oral   SpO2: 98%   Weight: 123.6 kg (272 lb 6.4 oz)   Height: 5' 5" (1.651 m)     Body mass index is 45.33 kg/m².    Physical Exam  Constitutional:       General: She is not in acute distress.  HENT:      Right Ear: Tympanic membrane and ear canal normal.      Left Ear: Tympanic membrane and ear canal normal.   Neck:      Musculoskeletal: Neck supple.   Cardiovascular:      Rate and Rhythm: Normal rate and regular rhythm.      Pulses: Normal pulses.      Heart sounds: Normal heart sounds. No murmur. No friction rub. No gallop.    Pulmonary:      Effort: Pulmonary effort is normal.      Breath sounds: Normal breath sounds. No wheezing, rhonchi or rales.   Musculoskeletal:      Right lower leg: No edema.      Left lower leg: No edema.   Neurological:      Mental Status: She is alert.           Depression Patient Health Questionnaire 10/19/2020 7/20/2020 3/11/2020 9/3/2019 6/17/2019 5/22/2019 9/24/2018   Over the last two weeks how often have you been bothered by little interest or pleasure in doing things 0 0 0 0 0 0 0   Over the last two weeks how often have you been bothered by feeling down, depressed or hopeless 0 0 0 0 0 0 0   PHQ-2 Total Score 0 0 0 0 0 0 0       PERTINENT RESULTS:   Lab Visit on 09/25/2020   Component Date Value Ref Range Status    Microalbum.,U,Random 09/25/2020 11.0  ug/mL Final    Creatinine, Random Ur 09/25/2020 214.0  15.0 - 325.0 mg/dL Final    Comment: The random urine reference ranges provided were established   for 24 hour urine collections.  No reference ranges exist for  random urine specimens.  Correlate clinically.      Microalb Creat Ratio 09/25/2020 5.1  0.0 - 30.0 ug/mg Final   Lab Visit on 09/25/2020   Component Date Value Ref Range Status    Sodium 09/25/2020 146* 136 - 145 " mmol/L Final    Potassium 09/25/2020 4.0  3.5 - 5.1 mmol/L Final    Chloride 09/25/2020 104  95 - 110 mmol/L Final    CO2 09/25/2020 34* 23 - 29 mmol/L Final    Glucose 09/25/2020 123* 70 - 110 mg/dL Final    BUN, Bld 09/25/2020 11  7 - 17 mg/dL Final    Creatinine 09/25/2020 0.80  0.50 - 1.40 mg/dL Final    Calcium 09/25/2020 9.2  8.7 - 10.5 mg/dL Final    Total Protein 09/25/2020 7.2  6.0 - 8.4 g/dL Final    Albumin 09/25/2020 4.3  3.5 - 5.2 g/dL Final    Total Bilirubin 09/25/2020 0.5  0.1 - 1.0 mg/dL Final    Comment: For infants and newborns, interpretation of results should be based  on gestational age, weight and in agreement with clinical  observations.  Premature Infant recommended reference ranges:  Up to 24 hours.............<8.0 mg/dL  Up to 48 hours............<12.0 mg/dL  3-5 days..................<15.0 mg/dL  6-29 days.................<15.0 mg/dL      Alkaline Phosphatase 09/25/2020 85  38 - 126 U/L Final    AST 09/25/2020 20  15 - 46 U/L Final    ALT 09/25/2020 14  10 - 44 U/L Final    Anion Gap 09/25/2020 8  8 - 16 mmol/L Final    eGFR if African American 09/25/2020 >60.0  >60 mL/min/1.73 m^2 Final    eGFR if non African American 09/25/2020 >60.0  >60 mL/min/1.73 m^2 Final    Comment: Calculation used to obtain the estimated glomerular filtration  rate (eGFR) is the CKD-EPI equation.       Cholesterol 09/25/2020 136  120 - 199 mg/dL Final    Comment: The National Cholesterol Education Program (NCEP) has set the  following guidelines (reference ranges) for Cholesterol:  Optimal.....................<200 mg/dL  Borderline High.............200-239 mg/dL  High........................> or = 240 mg/dL      Triglycerides 09/25/2020 46  30 - 150 mg/dL Final    Comment: The National Cholesterol Education Program (NCEP) has set the  following guidelines (reference values) for triglycerides:  Normal......................<150 mg/dL  Borderline High.............150-199  mg/dL  High........................200-499 mg/dL      HDL 09/25/2020 47  40 - 75 mg/dL Final    Comment: The National Cholesterol Education Program (NCEP) has set the  following guidelines (reference values) for HDL Cholesterol:  Low...............<40 mg/dL  Optimal...........>60 mg/dL      LDL Cholesterol 09/25/2020 79.8  63.0 - 159.0 mg/dL Final    Comment: The National Cholesterol Education Program (NCEP) has set the  following guidelines (reference values) for LDL Cholesterol:  Optimal.......................<130 mg/dL  Borderline High...............130-159 mg/dL  High..........................160-189 mg/dL  Very High.....................>190 mg/dL      Hdl/Cholesterol Ratio 09/25/2020 34.6  20.0 - 50.0 % Final    Total Cholesterol/HDL Ratio 09/25/2020 2.9  2.0 - 5.0 Final    Non-HDL Cholesterol 09/25/2020 89  mg/dL Final    Comment: Risk category and Non-HDL cholesterol goals:  Coronary heart disease (CHD)or equivalent (10-year risk of CHD >20%):  Non-HDL cholesterol goal     <130 mg/dL  Two or more CHD risk factors and 10-year risk of CHD <= 20%:  Non-HDL cholesterol goal     <160 mg/dL  0 to 1 CHD risk factor:  Non-HDL cholesterol goal     <190 mg/dL      Hemoglobin A1C 09/25/2020 6.2* 4.0 - 5.6 % Final    Comment: ADA Screening Guidelines:  5.7-6.4%  Consistent with prediabetes  >or=6.5%  Consistent with diabetes  High levels of fetal hemoglobin interfere with the HbA1C  assay. Heterozygous hemoglobin variants (HbS, HgC, etc)do  not significantly interfere with this assay.   However, presence of multiple variants may affect accuracy.      Estimated Avg Glucose 09/25/2020 131  68 - 131 mg/dL Final    Hepatitis C Ab 09/25/2020 Negative  Negative Final    HIV 1/2 Ag/Ab 09/25/2020 Negative  Negative Final       ASSESSMENT/PLAN:  Problem List Items Addressed This Visit        Cardiac/Vascular    Hyperlipidemia    Overview     Lab Results   Component Value Date    LDLCALC 79.8 09/25/2020     - well  controlled  - continue current medication         Relevant Medications    folic acid (FOLVITE) 1 MG tablet    Hypertension    Overview     - well controlled  - continue current medications         Relevant Medications    folic acid (FOLVITE) 1 MG tablet    Other Relevant Orders    Basic Metabolic Panel       ID    Viral illness    Overview     - recommend Covid-19 PCR testing  - if negative, okay to return to work on 10/20-21/2020 since >72  hours since fever and symptoms improved  - discussed with patient         Relevant Orders    COVID-19 Routine Screening       Endocrine    Diabetes mellitus, type 2 - Primary    Overview     Lab Results   Component Value Date    HGBA1C 6.2 (H) 09/25/2020     - well controlled  - continue current medication  - discussed recommendation for diet, exercise and weight loss  - counseling on lifestyle modifications for risk factor reduction         Relevant Medications    metFORMIN (GLUCOPHAGE-XR) 500 MG ER 24hr tablet    Other Relevant Orders    Basic Metabolic Panel    Hemoglobin A1C    Vitamin D deficiency    Overview     - completed D2 50,000 units weekly  - currently on D3 1000 units daily  - check D next labs         Relevant Medications    cholecalciferol, vitamin D3, (VITAMIN D3) 25 mcg (1,000 unit) capsule    Other Relevant Orders    Vitamin D          ORDERS:   Orders Placed This Encounter    Basic Metabolic Panel    Hemoglobin A1C    Vitamin D    COVID-19 Routine Screening    DIPH,PERTUSS,ACEL,,TET VAC,PF, ADULT (ADACEL) 2 Lf-(2.5-5-3-5 mcg)-5Lf/0.5 mL Syrg    varicella-zoster gE-AS01B, PF, (SHINGRIX, PF,) 50 mcg/0.5 mL injection    metFORMIN (GLUCOPHAGE-XR) 500 MG ER 24hr tablet    folic acid (FOLVITE) 1 MG tablet    cholecalciferol, vitamin D3, (VITAMIN D3) 25 mcg (1,000 unit) capsule     Vaccines recommended: flu vaccine once symptoms resolved  - Tdap and Shingles     Follow-up in 5 months with labs or sooner if any concerns.    Dr. Verenice Chase D.O.   Family  Medicine

## 2020-10-20 ENCOUNTER — PATIENT MESSAGE (OUTPATIENT)
Dept: FAMILY MEDICINE | Facility: CLINIC | Age: 60
End: 2020-10-20

## 2020-10-20 ENCOUNTER — TELEPHONE (OUTPATIENT)
Dept: FAMILY MEDICINE | Facility: CLINIC | Age: 60
End: 2020-10-20

## 2020-10-20 LAB — SARS-COV-2 RNA RESP QL NAA+PROBE: NOT DETECTED

## 2020-10-20 NOTE — TELEPHONE ENCOUNTER
----- Message from Verenice Chase DO sent at 10/20/2020  2:12 PM CDT -----  Please call patient Covid-19 testing negative. I completed return to work letter

## 2020-11-16 ENCOUNTER — PATIENT OUTREACH (OUTPATIENT)
Dept: ADMINISTRATIVE | Facility: OTHER | Age: 60
End: 2020-11-16

## 2020-11-16 NOTE — PROGRESS NOTES
Health Maintenance Due   Topic Date Due    TETANUS VACCINE  06[birth year]14/1978    Shingles Vaccine (1 of 2) 06/14/2009    Eye Exam  04/08/2020     Updates were requested from care everywhere.  Chart was reviewed for overdue Proactive [facility] Encounters ([facility]) topics (CRS, Breast Cancer Screening, Eye exam)  Health Maintenance has been updated.  [place] immunization registry triggered.  Immunizations were reconciled.

## 2020-11-17 ENCOUNTER — OFFICE VISIT (OUTPATIENT)
Dept: PAIN MEDICINE | Facility: CLINIC | Age: 60
End: 2020-11-17
Payer: COMMERCIAL

## 2020-11-17 VITALS
DIASTOLIC BLOOD PRESSURE: 73 MMHG | HEIGHT: 65 IN | BODY MASS INDEX: 45.89 KG/M2 | WEIGHT: 275.44 LBS | SYSTOLIC BLOOD PRESSURE: 144 MMHG | HEART RATE: 79 BPM

## 2020-11-17 DIAGNOSIS — M47.816 LUMBAR SPONDYLOSIS: Primary | ICD-10-CM

## 2020-11-17 DIAGNOSIS — M54.50 MIDLINE LOW BACK PAIN WITHOUT SCIATICA, UNSPECIFIED CHRONICITY: ICD-10-CM

## 2020-11-17 PROCEDURE — 3077F PR MOST RECENT SYSTOLIC BLOOD PRESSURE >= 140 MM HG: ICD-10-PCS | Mod: CPTII,S$GLB,, | Performed by: ANESTHESIOLOGY

## 2020-11-17 PROCEDURE — 3078F DIAST BP <80 MM HG: CPT | Mod: CPTII,S$GLB,, | Performed by: ANESTHESIOLOGY

## 2020-11-17 PROCEDURE — 99213 OFFICE O/P EST LOW 20 MIN: CPT | Mod: S$GLB,,, | Performed by: ANESTHESIOLOGY

## 2020-11-17 PROCEDURE — 1125F AMNT PAIN NOTED PAIN PRSNT: CPT | Mod: S$GLB,,, | Performed by: ANESTHESIOLOGY

## 2020-11-17 PROCEDURE — 1125F PR PAIN SEVERITY QUANTIFIED, PAIN PRESENT: ICD-10-PCS | Mod: S$GLB,,, | Performed by: ANESTHESIOLOGY

## 2020-11-17 PROCEDURE — 99999 PR PBB SHADOW E&M-EST. PATIENT-LVL IV: CPT | Mod: PBBFAC,,, | Performed by: ANESTHESIOLOGY

## 2020-11-17 PROCEDURE — 3008F PR BODY MASS INDEX (BMI) DOCUMENTED: ICD-10-PCS | Mod: CPTII,S$GLB,, | Performed by: ANESTHESIOLOGY

## 2020-11-17 PROCEDURE — 3008F BODY MASS INDEX DOCD: CPT | Mod: CPTII,S$GLB,, | Performed by: ANESTHESIOLOGY

## 2020-11-17 PROCEDURE — 3078F PR MOST RECENT DIASTOLIC BLOOD PRESSURE < 80 MM HG: ICD-10-PCS | Mod: CPTII,S$GLB,, | Performed by: ANESTHESIOLOGY

## 2020-11-17 PROCEDURE — 99999 PR PBB SHADOW E&M-EST. PATIENT-LVL IV: ICD-10-PCS | Mod: PBBFAC,,, | Performed by: ANESTHESIOLOGY

## 2020-11-17 PROCEDURE — 3077F SYST BP >= 140 MM HG: CPT | Mod: CPTII,S$GLB,, | Performed by: ANESTHESIOLOGY

## 2020-11-17 PROCEDURE — 99213 PR OFFICE/OUTPT VISIT, EST, LEVL III, 20-29 MIN: ICD-10-PCS | Mod: S$GLB,,, | Performed by: ANESTHESIOLOGY

## 2020-11-17 NOTE — PROGRESS NOTES
Chronic Pain - Established    INTERVAL HISTORY (11/17/2020):    Aminta Schreiber presents to the clinic today for a follow-up appointment for low back pain. Since the last visit, the back pain has been improving. Aminta reports significant improvement of her back pain after completing trial of physical therapy. She also notes improvement in her flexibility.  Current pain intensity is 2/10. She will only experience pain in her back when standing for long periods of time while at work. Overall, she is pleased with the progress she has made up until this point.      SUBJECTIVE:    Aminta Schreiber is a 61 y/o female with hx of kidney stones, HTN and diabetes who presents to the clinic for the evaluation of low back pain. The pain started approximately 7-8 weeks ago and symptoms have been persistent. No recent trauma or inciting event. The pain is located in the lower lumbar spine at the midline. There is no radiation.  The pain is described as aching, dull and throbbing and is rated as 5/10.  Symptoms interfere with daily activity and work. The pain is exacerbated by prolonged standing.  The pain is mitigated by massage and stretching. She has never tried physical therapy, chiropractic care or acupuncture.     Patient denies urinary incontinence, bowel incontinence, significant motor weakness and loss of sensations.    Physical Therapy/Home Exercise: no      Pain Disability Index Review:  No flowsheet data found.    Pain Medications:    - Lodine 300 mg as needed     report: Reviewed    Imaging:     XR LUMBAR SPINE COMPLETE 5 VIEW (9/17/2020):     CLINICAL HISTORY:  Lumbosacral osteoarthritis;Spondylosis without myelopathy or radiculopathy, lumbosacral region     TECHNIQUE:  AP, lateral, spot and bilateral oblique views of the lumbar spine were performed.     COMPARISON:  February 12 2008     FINDINGS:  Five lumbar type vertebral bodies.  Multilevel marginal spurring.  Mild uniform loss of disc height L3-4 through L5-S1 levels.   Prominent facet arthropathy mildly accentuated secondary rotation on the lateral view.  Numerous calcifications identified in the left paraspinous location lower L-spine.  No acute fracture or subluxation.  No pars defects.  No subluxation or fracture.  Pedicles and SI joints intact.  Multiple calcifications within the lower pelvis.    Past Medical History:   Diagnosis Date    Diabetes mellitus, type 2     GERD (gastroesophageal reflux disease)     Gross hematuria 4/30/2019    H/O left breast biopsy     Hyperlipidemia     Hypertension     Kidney stone     Migraines     Nephrolithiasis 7/24/2018    Raynaud's disease     Renal calculus, right 11/23/2015    Renal colic on right side 11/23/2015    Right ureteral calculus 4/30/2019    Urinary tract infection     Vaginal infection      Past Surgical History:   Procedure Laterality Date    EXTRACORPOREAL SHOCK WAVE LITHOTRIPSY Right 8/9/2018    Procedure: LITHOTRIPSY, ESWL;  Surgeon: Milton Garland MD;  Location: [facility] OR;  Service: Urology;  Laterality: Right;    EXTRACORPOREAL SHOCK WAVE LITHOTRIPSY Right 5/2/2019    Procedure: LITHOTRIPSY, ESWL;  Surgeon: Milton Garland MD;  Location: [facility] OR;  Service: Urology;  Laterality: Right;    EXTRACORPOREAL SHOCK WAVE LITHOTRIPSY Right 7/25/2019    Procedure: LITHOTRIPSY, ESWL;  Surgeon: Milton Garland MD;  Location: [facility] OR;  Service: Urology;  Laterality: Right;    HYSTERECTOMY      Partial    LITHOTRIPSY      x 3 or 4 per pt     Social History     Socioeconomic History    Marital status:      Spouse name: Not on file    Number of children: 3    Years of education: Not on file    Highest education level: Not on file   Occupational History    Not on file   Social Needs    Financial resource strain: Not on file    Food insecurity     Worry: Not on file     Inability: Not on file    Transportation needs     Medical: Not on file     Non-medical: Not on file   Tobacco Use    Smoking status:  "Current Every Day Smoker     Packs/day: 0.00     Types: Cigarettes    Smokeless tobacco: Never Used    Tobacco comment: 4-5 cigarettes a day   Substance and Sexual Activity    Alcohol use: Yes     Alcohol/week: 1.0 standard drinks     Types: 1 Standard drinks or equivalent per week     Comment: 'on occasion"    Drug use: No    Sexual activity: Yes     Partners: Male     Birth control/protection: Surgical   Lifestyle    Physical activity     Days per week: Not on file     Minutes per session: Not on file    Stress: Not on file   Relationships    Social connections     Talks on phone: Not on file     Gets together: Not on file     Attends Buddhist service: Not on file     Active member of club or organization: Not on file     Attends meetings of clubs or organizations: Not on file     Relationship status: Not on file   Other Topics Concern    Not on file   Social History Narrative    Not on file     Family History   Problem Relation Age of Onset    Diabetes Mother     Hypertension Mother     Cancer Father     No Known Problems Brother     Migraines Daughter     No Known Problems Son     Hypertension Son     Kidney disease Neg Hx        Review of patient's allergies indicates:  No Known Allergies    Current Outpatient Medications   Medication Sig    ascorbic acid, vitamin C, (VITAMIN C) 1000 MG tablet Take 1,000 mg by mouth once daily.    cholecalciferol, vitamin D3, (VITAMIN D3) 25 mcg (1,000 unit) capsule Take 1 capsule (1,000 Units total) by mouth once daily.    co-enzyme Q-10 30 mg capsule Take 30 mg by mouth 3 (three) times daily.    cyanocobalamin (VITAMIN B-12) 1000 MCG tablet Take 100 mcg by mouth once daily.    etodolac (LODINE) 300 MG Cap Take 1 capsule (300 mg total) by mouth 3 (three) times daily.    ferrous sulfate (FEOSOL) 325 mg (65 mg iron) Tab tablet Take 1 tablet by mouth once daily.    folic acid (FOLVITE) 1 MG tablet Take 1 tablet (1,000 mcg total) by mouth once daily.    " "losartan-hydrochlorothiazide 50-12.5 mg (HYZAAR) 50-12.5 mg per tablet losartan 50 mg-hydrochlorothiazide 12.5 mg tablet    twice daily    metFORMIN (GLUCOPHAGE-XR) 500 MG ER 24hr tablet Take 2 tablets (1,000 mg total) by mouth once daily.    omeprazole (PRILOSEC) 40 MG capsule Take 1 capsule (40 mg total) by mouth every morning.    rosuvastatin (CRESTOR) 10 MG tablet rosuvastatin 10 mg tablet    1 tablet every day by oral route.    trospium (SANCTURA) 20 mg Tab tablet TAKE 1 TABLET BY MOUTH TWICE A DAY    valACYclovir (VALTREX) 1000 MG tablet Take 1,000 mg by mouth once daily.     varicella-zoster [id number], PF, (SHINGRIX, PF,) 50 mcg/0.5 mL injection Inject 0.5mL IM at 0 and 2-6 months     No current facility-administered medications for this visit.        REVIEW OF SYSTEMS:  GENERAL: No weight loss, malaise or fevers.  HEENT: Negative for frequent or significant headaches.  RESPIRATORY: Negative for wheezing or shortness of breath.  CARDIOVASCULAR: Negative for chest pain or palpitations.  GI: No blood in stools or black stools or change in bowel habits.  : Negative for urinary tract infections or incontinence. + hx of kidney stones.  MUSCULOSKELETAL: See HPI  SKIN: Negative for rash or itching.  PSYCH: Negative for sleep disturbance, mood disorder and recent psychosocial stressors.    HEMATOLOGY/LYMPHOLOGY Negative for prolonged bleeding, bruising easily or swollen nodes.  NEURO:  No history of seizures or tremors.    OBJECTIVE:    BP (!) 144/73   Pulse 79   Ht 5' 5" (1.651 m)   Wt 125 kg (275 lb 7.4 oz)   BMI 45.84 kg/m²     PHYSICAL EXAMINATION:  GENERAL: Well appearing, in no acute distress.  PSYCH:  Mood and affect is appropriate.  Awake, alert, and oriented x 3.  SKIN: Skin color, texture, turgor normal, no rashes or lesions  HEENT: Normocephalic, atraumatic.  EOM intact.  CV: Radial pulses are 2+.  RESP:  Respirations are unlabored.  GI: Abdomen soft and non-tender.  MSK:  No atrophy or tone " abnormalities are noted.      Neck: No pain with neck flexion, extension, or lateral rotation.  No obvious deformity or signs of trauma.  Normal cervical spine range of motion.    Back: ROM is mildly limited on flexion and extension. Straight leg raising is negative for radicular pain. No tenderness to palpation over the lumbar spine.  Negative for pain with facet loading and back extension/rotation.     Buttocks:  No pain to palpation over the PSIS. Sacroiliac joint maneuvers are negative for pain.     Extremities:  Peripheral joint ROM is full and pain free without obvious instability or laxity in all four extremities. No edema or skin discolorations noted.     Gait:  Gait is normal    NEUR:  Bilateral lower extremity coordination and muscle stretch reflexes are physiologic and symmetric.  No loss of sensation is noted.     Strength testing:    Right hip flexion: 5/5  Left hip flexion: 5/5  Right knee extension: 5/5  Left knee extension: 5/5  Right knee flexion: 5/5  Left knee flexion: 5/5  Right ankle dorsiflexion: 5/5  Left ankle dorsiflexion: 5/5        ASSESSMENT: 61 y/o female with axial low back pain which is improved following trial of PT.    1. Lumbar spondylosis    2. Midline low back pain without sciatica, unspecified chronicity          PLAN:     - I have stressed the importance of physical activity and a home exercise plan to help with pain and improve health.  - Continue home exercise plan learned in therapy.  - Continue Lodine as needed for pain.  - Return to clinic with any new or worsening symptoms, or if symptoms persist.        The above plan and management options were discussed at length with patient. Patient is in agreement with the above and verbalized understanding. It will be communicated with the referring physician via electronic record, fax, or mail.    Jey Pina III  11/17/2020

## 2020-11-19 PROBLEM — Z74.09 IMPAIRED MOBILITY AND ACTIVITIES OF DAILY LIVING: Status: RESOLVED | Noted: 2020-10-16 | Resolved: 2020-11-19

## 2020-11-19 PROBLEM — Z78.9 IMPAIRED MOBILITY AND ACTIVITIES OF DAILY LIVING: Status: RESOLVED | Noted: 2020-10-16 | Resolved: 2020-11-19

## 2020-11-27 ENCOUNTER — PATIENT MESSAGE (OUTPATIENT)
Dept: FAMILY MEDICINE | Facility: CLINIC | Age: 60
End: 2020-11-27

## 2020-11-27 RX ORDER — OMEPRAZOLE 40 MG/1
40 CAPSULE, DELAYED RELEASE ORAL EVERY MORNING
Qty: 30 CAPSULE | Refills: 6 | Status: SHIPPED | OUTPATIENT
Start: 2020-11-27 | End: 2021-03-29 | Stop reason: SDUPTHER

## 2020-12-28 ENCOUNTER — PATIENT MESSAGE (OUTPATIENT)
Dept: FAMILY MEDICINE | Facility: CLINIC | Age: 60
End: 2020-12-28

## 2020-12-28 RX ORDER — FERROUS SULFATE 325(65) MG
1 TABLET ORAL DAILY
Qty: 90 TABLET | Refills: 1 | Status: SHIPPED | OUTPATIENT
Start: 2020-12-28 | End: 2021-03-29 | Stop reason: SDUPTHER

## 2021-01-04 ENCOUNTER — PATIENT MESSAGE (OUTPATIENT)
Dept: ADMINISTRATIVE | Facility: HOSPITAL | Age: 61
End: 2021-01-04

## 2021-01-14 ENCOUNTER — PATIENT MESSAGE (OUTPATIENT)
Dept: UROLOGY | Facility: CLINIC | Age: 61
End: 2021-01-14

## 2021-01-14 DIAGNOSIS — N32.81 OVERACTIVE BLADDER: ICD-10-CM

## 2021-01-14 RX ORDER — TROSPIUM CHLORIDE 20 MG/1
20 TABLET, FILM COATED ORAL 2 TIMES DAILY
Qty: 180 TABLET | Refills: 0 | Status: SHIPPED | OUTPATIENT
Start: 2021-01-14 | End: 2021-02-26 | Stop reason: SDUPTHER

## 2021-01-26 ENCOUNTER — PATIENT MESSAGE (OUTPATIENT)
Dept: UROLOGY | Facility: CLINIC | Age: 61
End: 2021-01-26

## 2021-02-26 ENCOUNTER — PATIENT MESSAGE (OUTPATIENT)
Dept: FAMILY MEDICINE | Facility: CLINIC | Age: 61
End: 2021-02-26

## 2021-02-26 ENCOUNTER — PATIENT MESSAGE (OUTPATIENT)
Dept: UROLOGY | Facility: CLINIC | Age: 61
End: 2021-02-26

## 2021-02-26 DIAGNOSIS — N32.81 OVERACTIVE BLADDER: ICD-10-CM

## 2021-02-26 RX ORDER — TROSPIUM CHLORIDE 20 MG/1
20 TABLET, FILM COATED ORAL 2 TIMES DAILY
Qty: 180 TABLET | Refills: 0 | Status: SHIPPED | OUTPATIENT
Start: 2021-02-26 | End: 2021-03-09

## 2021-02-26 RX ORDER — LOSARTAN POTASSIUM AND HYDROCHLOROTHIAZIDE 12.5; 5 MG/1; MG/1
1 TABLET ORAL DAILY
Qty: 90 TABLET | Refills: 1 | Status: SHIPPED | OUTPATIENT
Start: 2021-02-26 | End: 2021-03-02 | Stop reason: SDUPTHER

## 2021-03-01 ENCOUNTER — PATIENT MESSAGE (OUTPATIENT)
Dept: FAMILY MEDICINE | Facility: CLINIC | Age: 61
End: 2021-03-01

## 2021-03-01 DIAGNOSIS — I10 ESSENTIAL HYPERTENSION: Primary | ICD-10-CM

## 2021-03-02 RX ORDER — LOSARTAN POTASSIUM AND HYDROCHLOROTHIAZIDE 12.5; 5 MG/1; MG/1
1 TABLET ORAL 2 TIMES DAILY
Qty: 180 TABLET | Refills: 3 | Status: SHIPPED | OUTPATIENT
Start: 2021-03-02 | End: 2021-03-29

## 2021-03-05 ENCOUNTER — PATIENT OUTREACH (OUTPATIENT)
Dept: ADMINISTRATIVE | Facility: OTHER | Age: 61
End: 2021-03-05

## 2021-03-09 ENCOUNTER — OFFICE VISIT (OUTPATIENT)
Dept: UROLOGY | Facility: CLINIC | Age: 61
End: 2021-03-09
Payer: COMMERCIAL

## 2021-03-09 VITALS
HEIGHT: 65 IN | WEIGHT: 277.75 LBS | DIASTOLIC BLOOD PRESSURE: 68 MMHG | SYSTOLIC BLOOD PRESSURE: 108 MMHG | RESPIRATION RATE: 18 BRPM | OXYGEN SATURATION: 99 % | TEMPERATURE: 98 F | HEART RATE: 74 BPM | BODY MASS INDEX: 46.28 KG/M2

## 2021-03-09 DIAGNOSIS — N39.3 SUI (STRESS URINARY INCONTINENCE, FEMALE): ICD-10-CM

## 2021-03-09 DIAGNOSIS — R35.0 URINARY FREQUENCY: ICD-10-CM

## 2021-03-09 DIAGNOSIS — R35.1 NOCTURIA: ICD-10-CM

## 2021-03-09 DIAGNOSIS — N39.41 URGE URINARY INCONTINENCE: Primary | ICD-10-CM

## 2021-03-09 DIAGNOSIS — N32.81 OVERACTIVE BLADDER: ICD-10-CM

## 2021-03-09 DIAGNOSIS — R39.15 URINARY URGENCY: ICD-10-CM

## 2021-03-09 LAB
BILIRUB UR QL STRIP: NEGATIVE
CLARITY UR REFRACT.AUTO: CLEAR
COLOR UR AUTO: YELLOW
GLUCOSE UR QL STRIP: NEGATIVE
HGB UR QL STRIP: NEGATIVE
KETONES UR QL STRIP: NEGATIVE
LEUKOCYTE ESTERASE UR QL STRIP: NEGATIVE
NITRITE UR QL STRIP: NEGATIVE
PH UR STRIP: 7 [PH] (ref 5–8)
PROT UR QL STRIP: NEGATIVE
SP GR UR STRIP: 1.02 (ref 1–1.03)
URN SPEC COLLECT METH UR: NORMAL

## 2021-03-09 PROCEDURE — 3074F SYST BP LT 130 MM HG: CPT | Mod: CPTII,S$GLB,, | Performed by: UROLOGY

## 2021-03-09 PROCEDURE — 99999 PR PBB SHADOW E&M-EST. PATIENT-LVL IV: ICD-10-PCS | Mod: PBBFAC,,, | Performed by: UROLOGY

## 2021-03-09 PROCEDURE — 99214 OFFICE O/P EST MOD 30 MIN: CPT | Mod: S$GLB,,, | Performed by: UROLOGY

## 2021-03-09 PROCEDURE — 3078F DIAST BP <80 MM HG: CPT | Mod: CPTII,S$GLB,, | Performed by: UROLOGY

## 2021-03-09 PROCEDURE — 1126F PR PAIN SEVERITY QUANTIFIED, NO PAIN PRESENT: ICD-10-PCS | Mod: S$GLB,,, | Performed by: UROLOGY

## 2021-03-09 PROCEDURE — 3074F PR MOST RECENT SYSTOLIC BLOOD PRESSURE < 130 MM HG: ICD-10-PCS | Mod: CPTII,S$GLB,, | Performed by: UROLOGY

## 2021-03-09 PROCEDURE — 1126F AMNT PAIN NOTED NONE PRSNT: CPT | Mod: S$GLB,,, | Performed by: UROLOGY

## 2021-03-09 PROCEDURE — 81003 URINALYSIS AUTO W/O SCOPE: CPT | Performed by: UROLOGY

## 2021-03-09 PROCEDURE — 99214 PR OFFICE/OUTPT VISIT, EST, LEVL IV, 30-39 MIN: ICD-10-PCS | Mod: S$GLB,,, | Performed by: UROLOGY

## 2021-03-09 PROCEDURE — 87086 URINE CULTURE/COLONY COUNT: CPT | Performed by: UROLOGY

## 2021-03-09 PROCEDURE — 3008F PR BODY MASS INDEX (BMI) DOCUMENTED: ICD-10-PCS | Mod: CPTII,S$GLB,, | Performed by: UROLOGY

## 2021-03-09 PROCEDURE — 3008F BODY MASS INDEX DOCD: CPT | Mod: CPTII,S$GLB,, | Performed by: UROLOGY

## 2021-03-09 PROCEDURE — 3078F PR MOST RECENT DIASTOLIC BLOOD PRESSURE < 80 MM HG: ICD-10-PCS | Mod: CPTII,S$GLB,, | Performed by: UROLOGY

## 2021-03-09 PROCEDURE — 99999 PR PBB SHADOW E&M-EST. PATIENT-LVL IV: CPT | Mod: PBBFAC,,, | Performed by: UROLOGY

## 2021-03-09 RX ORDER — MIRABEGRON 50 MG/1
50 TABLET, FILM COATED, EXTENDED RELEASE ORAL DAILY
Qty: 30 TABLET | Refills: 11 | Status: SHIPPED | OUTPATIENT
Start: 2021-03-09 | End: 2022-03-21 | Stop reason: SDUPTHER

## 2021-03-10 DIAGNOSIS — E11.9 TYPE 2 DIABETES MELLITUS WITHOUT COMPLICATION, UNSPECIFIED WHETHER LONG TERM INSULIN USE: ICD-10-CM

## 2021-03-11 LAB — BACTERIA UR CULT: NO GROWTH

## 2021-03-22 LAB
LEFT EYE DM RETINOPATHY: NEGATIVE
RIGHT EYE DM RETINOPATHY: NEGATIVE

## 2021-03-24 DIAGNOSIS — Z12.31 OTHER SCREENING MAMMOGRAM: ICD-10-CM

## 2021-03-25 PROBLEM — B34.9 VIRAL ILLNESS: Status: RESOLVED | Noted: 2020-10-19 | Resolved: 2021-03-25

## 2021-03-27 ENCOUNTER — HOSPITAL ENCOUNTER (OUTPATIENT)
Dept: RADIOLOGY | Facility: HOSPITAL | Age: 61
Discharge: HOME | End: 2021-03-27
Attending: FAMILY MEDICINE
Payer: COMMERCIAL

## 2021-03-27 DIAGNOSIS — Z12.31 OTHER SCREENING MAMMOGRAM: ICD-10-CM

## 2021-03-27 PROCEDURE — 77067 MAMMO DIGITAL SCREENING BILAT WITH TOMO: ICD-10-PCS | Mod: 26,,, | Performed by: RADIOLOGY

## 2021-03-27 PROCEDURE — 77063 MAMMO DIGITAL SCREENING BILAT WITH TOMO: ICD-10-PCS | Mod: 26,,, | Performed by: RADIOLOGY

## 2021-03-27 PROCEDURE — 77067 SCR MAMMO BI INCL CAD: CPT | Mod: 26,,, | Performed by: RADIOLOGY

## 2021-03-27 PROCEDURE — 77063 BREAST TOMOSYNTHESIS BI: CPT | Mod: 26,,, | Performed by: RADIOLOGY

## 2021-03-27 PROCEDURE — 77067 SCR MAMMO BI INCL CAD: CPT | Mod: TC

## 2021-03-29 ENCOUNTER — PATIENT OUTREACH (OUTPATIENT)
Dept: ADMINISTRATIVE | Facility: HOSPITAL | Age: 61
End: 2021-03-29

## 2021-03-29 ENCOUNTER — OFFICE VISIT (OUTPATIENT)
Dept: FAMILY MEDICINE | Facility: CLINIC | Age: 61
End: 2021-03-29
Payer: COMMERCIAL

## 2021-03-29 VITALS
HEART RATE: 86 BPM | SYSTOLIC BLOOD PRESSURE: 130 MMHG | WEIGHT: 285 LBS | BODY MASS INDEX: 47.48 KG/M2 | HEIGHT: 65 IN | RESPIRATION RATE: 18 BRPM | OXYGEN SATURATION: 98 % | DIASTOLIC BLOOD PRESSURE: 70 MMHG

## 2021-03-29 DIAGNOSIS — D17.79 MYELOLIPOMA OF RIGHT ADRENAL GLAND: ICD-10-CM

## 2021-03-29 DIAGNOSIS — D17.79 MYELOLIPOMA OF LEFT ADRENAL GLAND: ICD-10-CM

## 2021-03-29 DIAGNOSIS — K21.9 GASTROESOPHAGEAL REFLUX DISEASE WITHOUT ESOPHAGITIS: ICD-10-CM

## 2021-03-29 DIAGNOSIS — E78.00 PURE HYPERCHOLESTEROLEMIA: ICD-10-CM

## 2021-03-29 DIAGNOSIS — E55.9 VITAMIN D DEFICIENCY: ICD-10-CM

## 2021-03-29 DIAGNOSIS — E11.9 TYPE 2 DIABETES MELLITUS WITHOUT COMPLICATION, WITHOUT LONG-TERM CURRENT USE OF INSULIN: ICD-10-CM

## 2021-03-29 DIAGNOSIS — I10 ESSENTIAL HYPERTENSION: Primary | ICD-10-CM

## 2021-03-29 PROBLEM — Z86.010 HISTORY OF COLON POLYPS: Status: ACTIVE | Noted: 2021-03-29

## 2021-03-29 PROBLEM — M72.2 PLANTAR FASCIITIS: Status: ACTIVE | Noted: 2021-03-29

## 2021-03-29 PROBLEM — Z86.0100 HISTORY OF COLON POLYPS: Status: ACTIVE | Noted: 2021-03-29

## 2021-03-29 PROCEDURE — 3078F DIAST BP <80 MM HG: CPT | Mod: CPTII,S$GLB,, | Performed by: FAMILY MEDICINE

## 2021-03-29 PROCEDURE — 3078F PR MOST RECENT DIASTOLIC BLOOD PRESSURE < 80 MM HG: ICD-10-PCS | Mod: CPTII,S$GLB,, | Performed by: FAMILY MEDICINE

## 2021-03-29 PROCEDURE — 3008F PR BODY MASS INDEX (BMI) DOCUMENTED: ICD-10-PCS | Mod: CPTII,S$GLB,, | Performed by: FAMILY MEDICINE

## 2021-03-29 PROCEDURE — 1125F PR PAIN SEVERITY QUANTIFIED, PAIN PRESENT: ICD-10-PCS | Mod: S$GLB,,, | Performed by: FAMILY MEDICINE

## 2021-03-29 PROCEDURE — 3044F PR MOST RECENT HEMOGLOBIN A1C LEVEL <7.0%: ICD-10-PCS | Mod: CPTII,S$GLB,, | Performed by: FAMILY MEDICINE

## 2021-03-29 PROCEDURE — 3008F BODY MASS INDEX DOCD: CPT | Mod: CPTII,S$GLB,, | Performed by: FAMILY MEDICINE

## 2021-03-29 PROCEDURE — 3075F SYST BP GE 130 - 139MM HG: CPT | Mod: CPTII,S$GLB,, | Performed by: FAMILY MEDICINE

## 2021-03-29 PROCEDURE — 99214 PR OFFICE/OUTPT VISIT, EST, LEVL IV, 30-39 MIN: ICD-10-PCS | Mod: S$GLB,,, | Performed by: FAMILY MEDICINE

## 2021-03-29 PROCEDURE — 1125F AMNT PAIN NOTED PAIN PRSNT: CPT | Mod: S$GLB,,, | Performed by: FAMILY MEDICINE

## 2021-03-29 PROCEDURE — 99999 PR PBB SHADOW E&M-EST. PATIENT-LVL IV: CPT | Mod: PBBFAC,,, | Performed by: FAMILY MEDICINE

## 2021-03-29 PROCEDURE — 3044F HG A1C LEVEL LT 7.0%: CPT | Mod: CPTII,S$GLB,, | Performed by: FAMILY MEDICINE

## 2021-03-29 PROCEDURE — 99999 PR PBB SHADOW E&M-EST. PATIENT-LVL IV: ICD-10-PCS | Mod: PBBFAC,,, | Performed by: FAMILY MEDICINE

## 2021-03-29 PROCEDURE — 99214 OFFICE O/P EST MOD 30 MIN: CPT | Mod: S$GLB,,, | Performed by: FAMILY MEDICINE

## 2021-03-29 PROCEDURE — 3075F PR MOST RECENT SYSTOLIC BLOOD PRESS GE 130-139MM HG: ICD-10-PCS | Mod: CPTII,S$GLB,, | Performed by: FAMILY MEDICINE

## 2021-03-29 RX ORDER — ACETAMINOPHEN 500 MG
1 TABLET ORAL DAILY
Qty: 90 CAPSULE | Refills: 2 | Status: SHIPPED | OUTPATIENT
Start: 2021-03-29

## 2021-03-29 RX ORDER — METFORMIN HYDROCHLORIDE 500 MG/1
1000 TABLET, EXTENDED RELEASE ORAL DAILY
Qty: 180 TABLET | Refills: 2 | Status: SHIPPED | OUTPATIENT
Start: 2021-03-29 | End: 2021-07-23 | Stop reason: SDUPTHER

## 2021-03-29 RX ORDER — OMEPRAZOLE 40 MG/1
40 CAPSULE, DELAYED RELEASE ORAL EVERY MORNING
Qty: 90 CAPSULE | Refills: 3 | Status: SHIPPED | OUTPATIENT
Start: 2021-03-29 | End: 2021-12-29 | Stop reason: SDUPTHER

## 2021-03-29 RX ORDER — LOSARTAN POTASSIUM AND HYDROCHLOROTHIAZIDE 25; 100 MG/1; MG/1
1 TABLET ORAL DAILY
Qty: 90 TABLET | Refills: 3 | Status: SHIPPED | OUTPATIENT
Start: 2021-03-29 | End: 2021-12-29 | Stop reason: SDUPTHER

## 2021-03-29 RX ORDER — FERROUS SULFATE 325(65) MG
1 TABLET ORAL DAILY
Qty: 90 TABLET | Refills: 1 | Status: SHIPPED | OUTPATIENT
Start: 2021-03-29 | End: 2021-12-29 | Stop reason: SDUPTHER

## 2021-03-29 RX ORDER — LOSARTAN POTASSIUM AND HYDROCHLOROTHIAZIDE 12.5; 5 MG/1; MG/1
1 TABLET ORAL 2 TIMES DAILY
Qty: 180 TABLET | Refills: 3 | Status: CANCELLED | OUTPATIENT
Start: 2021-03-29

## 2021-03-29 RX ORDER — ROSUVASTATIN CALCIUM 10 MG/1
10 TABLET, COATED ORAL NIGHTLY
Qty: 90 TABLET | Refills: 2 | Status: SHIPPED | OUTPATIENT
Start: 2021-03-29 | End: 2021-12-29 | Stop reason: SDUPTHER

## 2021-03-29 RX ORDER — FOLIC ACID 1 MG/1
1000 TABLET ORAL DAILY
Qty: 90 TABLET | Refills: 2 | Status: SHIPPED | OUTPATIENT
Start: 2021-03-29 | End: 2021-12-29 | Stop reason: SDUPTHER

## 2021-03-30 ENCOUNTER — PATIENT MESSAGE (OUTPATIENT)
Dept: FAMILY MEDICINE | Facility: CLINIC | Age: 61
End: 2021-03-30

## 2021-04-01 ENCOUNTER — PATIENT OUTREACH (OUTPATIENT)
Dept: ADMINISTRATIVE | Facility: HOSPITAL | Age: 61
End: 2021-04-01

## 2021-04-20 ENCOUNTER — HOSPITAL ENCOUNTER (OUTPATIENT)
Dept: RADIOLOGY | Facility: HOSPITAL | Age: 61
Discharge: HOME | End: 2021-04-20
Attending: FAMILY MEDICINE
Payer: COMMERCIAL

## 2021-04-20 DIAGNOSIS — R92.8 ABNORMAL MAMMOGRAM: ICD-10-CM

## 2021-04-20 PROCEDURE — 77065 DX MAMMO INCL CAD UNI: CPT | Mod: 26,LT,, | Performed by: RADIOLOGY

## 2021-04-20 PROCEDURE — 77061 BREAST TOMOSYNTHESIS UNI: CPT | Mod: TC,LT

## 2021-04-20 PROCEDURE — 77065 MAMMO DIGITAL DIAGNOSTIC LEFT WITH TOMO: ICD-10-PCS | Mod: 26,LT,, | Performed by: RADIOLOGY

## 2021-04-20 PROCEDURE — 77061 BREAST TOMOSYNTHESIS UNI: CPT | Mod: 26,LT,, | Performed by: RADIOLOGY

## 2021-04-20 PROCEDURE — 77061 MAMMO DIGITAL DIAGNOSTIC LEFT WITH TOMO: ICD-10-PCS | Mod: 26,LT,, | Performed by: RADIOLOGY

## 2021-04-22 ENCOUNTER — TELEPHONE (OUTPATIENT)
Dept: RADIOLOGY | Facility: HOSPITAL | Age: 61
End: 2021-04-22

## 2021-04-23 ENCOUNTER — TELEPHONE (OUTPATIENT)
Dept: RADIOLOGY | Facility: HOSPITAL | Age: 61
End: 2021-04-23

## 2021-04-30 ENCOUNTER — TELEPHONE (OUTPATIENT)
Dept: RADIOLOGY | Facility: HOSPITAL | Age: 61
End: 2021-04-30

## 2021-04-30 ENCOUNTER — TELEPHONE (OUTPATIENT)
Dept: FAMILY MEDICINE | Facility: CLINIC | Age: 61
End: 2021-04-30

## 2021-05-04 ENCOUNTER — PATIENT MESSAGE (OUTPATIENT)
Dept: RESEARCH | Facility: HOSPITAL | Age: 61
End: 2021-05-04

## 2021-05-05 ENCOUNTER — PATIENT MESSAGE (OUTPATIENT)
Dept: FAMILY MEDICINE | Facility: CLINIC | Age: 61
End: 2021-05-05

## 2021-05-05 DIAGNOSIS — F41.9 ANXIETY: Primary | ICD-10-CM

## 2021-05-05 RX ORDER — LORAZEPAM 1 MG/1
1 TABLET ORAL 2 TIMES DAILY PRN
Qty: 2 TABLET | Refills: 0 | Status: SHIPPED | OUTPATIENT
Start: 2021-05-05 | End: 2021-06-08

## 2021-05-07 ENCOUNTER — HOSPITAL ENCOUNTER (OUTPATIENT)
Dept: RADIOLOGY | Facility: HOSPITAL | Age: 61
Discharge: HOME | End: 2021-05-07
Attending: FAMILY MEDICINE
Payer: COMMERCIAL

## 2021-05-07 DIAGNOSIS — R92.8 ABNORMAL MAMMOGRAM: Primary | ICD-10-CM

## 2021-05-07 PROCEDURE — 27200939 MAMMO BREAST STEREOTACTIC BREAST BIOPSY LEFT

## 2021-05-07 PROCEDURE — 25000003 PHARM REV CODE 250: Performed by: FAMILY MEDICINE

## 2021-05-07 PROCEDURE — 77065 DX MAMMO INCL CAD UNI: CPT | Mod: 26,LT,, | Performed by: RADIOLOGY

## 2021-05-07 PROCEDURE — 77065 DX MAMMO INCL CAD UNI: CPT | Mod: TC,LT

## 2021-05-07 PROCEDURE — 88305 TISSUE EXAM BY PATHOLOGIST: ICD-10-PCS | Mod: 26,,, | Performed by: STUDENT IN AN ORGANIZED HEALTH CARE EDUCATION/TRAINING PROGRAM

## 2021-05-07 PROCEDURE — 88305 TISSUE EXAM BY PATHOLOGIST: CPT | Mod: 26,,, | Performed by: STUDENT IN AN ORGANIZED HEALTH CARE EDUCATION/TRAINING PROGRAM

## 2021-05-07 PROCEDURE — 19081 MAMMO BREAST STEREOTACTIC BREAST BIOPSY LEFT: ICD-10-PCS | Mod: LT,,, | Performed by: RADIOLOGY

## 2021-05-07 PROCEDURE — 19081 BX BREAST 1ST LESION STRTCTC: CPT | Mod: LT

## 2021-05-07 PROCEDURE — 88305 TISSUE EXAM BY PATHOLOGIST: CPT | Performed by: STUDENT IN AN ORGANIZED HEALTH CARE EDUCATION/TRAINING PROGRAM

## 2021-05-07 PROCEDURE — 77065 MAMMO DIGITAL DIAGNOSTIC LEFT: ICD-10-PCS | Mod: 26,LT,, | Performed by: RADIOLOGY

## 2021-05-07 PROCEDURE — 19081 BX BREAST 1ST LESION STRTCTC: CPT | Mod: LT,,, | Performed by: RADIOLOGY

## 2021-05-07 RX ORDER — LIDOCAINE HYDROCHLORIDE AND EPINEPHRINE 10; 10 MG/ML; UG/ML
20 INJECTION, SOLUTION INFILTRATION; PERINEURAL ONCE
Status: COMPLETED | OUTPATIENT
Start: 2021-05-07 | End: 2021-05-07

## 2021-05-07 RX ORDER — LIDOCAINE HYDROCHLORIDE 10 MG/ML
5 INJECTION INFILTRATION; PERINEURAL ONCE
Status: COMPLETED | OUTPATIENT
Start: 2021-05-07 | End: 2021-05-07

## 2021-05-07 RX ADMIN — LIDOCAINE HYDROCHLORIDE,EPINEPHRINE BITARTRATE 20 ML: 10; .01 INJECTION, SOLUTION INFILTRATION; PERINEURAL at 11:05

## 2021-05-07 RX ADMIN — LIDOCAINE HYDROCHLORIDE 3 ML: 10 INJECTION, SOLUTION EPIDURAL; INFILTRATION; INTRACAUDAL; PERINEURAL at 12:05

## 2021-05-10 ENCOUNTER — TELEPHONE (OUTPATIENT)
Dept: SURGERY | Facility: CLINIC | Age: 61
End: 2021-05-10

## 2021-05-10 ENCOUNTER — PATIENT MESSAGE (OUTPATIENT)
Dept: RESEARCH | Facility: HOSPITAL | Age: 61
End: 2021-05-10

## 2021-05-10 LAB
COMMENT: NORMAL
FINAL PATHOLOGIC DIAGNOSIS: NORMAL
GROSS: NORMAL
Lab: NORMAL

## 2021-06-04 ENCOUNTER — PATIENT MESSAGE (OUTPATIENT)
Dept: FAMILY MEDICINE | Facility: CLINIC | Age: 61
End: 2021-06-04

## 2021-06-04 ENCOUNTER — PATIENT OUTREACH (OUTPATIENT)
Dept: ADMINISTRATIVE | Facility: OTHER | Age: 61
End: 2021-06-04

## 2021-06-08 ENCOUNTER — OFFICE VISIT (OUTPATIENT)
Dept: UROLOGY | Facility: CLINIC | Age: 61
End: 2021-06-08
Payer: COMMERCIAL

## 2021-06-08 VITALS
RESPIRATION RATE: 18 BRPM | OXYGEN SATURATION: 98 % | HEIGHT: 65 IN | SYSTOLIC BLOOD PRESSURE: 127 MMHG | WEIGHT: 285 LBS | BODY MASS INDEX: 47.48 KG/M2 | HEART RATE: 73 BPM | DIASTOLIC BLOOD PRESSURE: 70 MMHG

## 2021-06-08 DIAGNOSIS — R10.9 RIGHT FLANK PAIN: ICD-10-CM

## 2021-06-08 DIAGNOSIS — Z87.442 HISTORY OF NEPHROLITHIASIS: Primary | ICD-10-CM

## 2021-06-08 PROBLEM — N63.20 LEFT BREAST MASS: Status: ACTIVE | Noted: 2021-06-08

## 2021-06-08 LAB
BILIRUB UR QL STRIP: NEGATIVE
CLARITY UR REFRACT.AUTO: CLEAR
COLOR UR AUTO: YELLOW
GLUCOSE UR QL STRIP: NEGATIVE
HGB UR QL STRIP: NEGATIVE
KETONES UR QL STRIP: NEGATIVE
LEUKOCYTE ESTERASE UR QL STRIP: NEGATIVE
NITRITE UR QL STRIP: NEGATIVE
PH UR STRIP: 5 [PH] (ref 5–8)
PROT UR QL STRIP: NEGATIVE
SP GR UR STRIP: 1.03 (ref 1–1.03)
URN SPEC COLLECT METH UR: NORMAL

## 2021-06-08 PROCEDURE — 99999 PR PBB SHADOW E&M-EST. PATIENT-LVL IV: CPT | Mod: PBBFAC,,, | Performed by: UROLOGY

## 2021-06-08 PROCEDURE — 1125F PR PAIN SEVERITY QUANTIFIED, PAIN PRESENT: ICD-10-PCS | Mod: S$GLB,,, | Performed by: UROLOGY

## 2021-06-08 PROCEDURE — 81003 URINALYSIS AUTO W/O SCOPE: CPT | Performed by: UROLOGY

## 2021-06-08 PROCEDURE — 99214 PR OFFICE/OUTPT VISIT, EST, LEVL IV, 30-39 MIN: ICD-10-PCS | Mod: S$GLB,,, | Performed by: UROLOGY

## 2021-06-08 PROCEDURE — 1125F AMNT PAIN NOTED PAIN PRSNT: CPT | Mod: S$GLB,,, | Performed by: UROLOGY

## 2021-06-08 PROCEDURE — 3008F PR BODY MASS INDEX (BMI) DOCUMENTED: ICD-10-PCS | Mod: CPTII,S$GLB,, | Performed by: UROLOGY

## 2021-06-08 PROCEDURE — 99999 PR PBB SHADOW E&M-EST. PATIENT-LVL IV: ICD-10-PCS | Mod: PBBFAC,,, | Performed by: UROLOGY

## 2021-06-08 PROCEDURE — 3008F BODY MASS INDEX DOCD: CPT | Mod: CPTII,S$GLB,, | Performed by: UROLOGY

## 2021-06-08 PROCEDURE — 99214 OFFICE O/P EST MOD 30 MIN: CPT | Mod: S$GLB,,, | Performed by: UROLOGY

## 2021-06-09 ENCOUNTER — OFFICE VISIT (OUTPATIENT)
Dept: FAMILY MEDICINE | Facility: CLINIC | Age: 61
End: 2021-06-09
Payer: COMMERCIAL

## 2021-06-09 VITALS
BODY MASS INDEX: 47.86 KG/M2 | OXYGEN SATURATION: 98 % | DIASTOLIC BLOOD PRESSURE: 80 MMHG | SYSTOLIC BLOOD PRESSURE: 120 MMHG | WEIGHT: 287.25 LBS | RESPIRATION RATE: 18 BRPM | HEART RATE: 80 BPM | HEIGHT: 65 IN

## 2021-06-09 DIAGNOSIS — E11.9 TYPE 2 DIABETES MELLITUS WITHOUT COMPLICATION, WITHOUT LONG-TERM CURRENT USE OF INSULIN: ICD-10-CM

## 2021-06-09 DIAGNOSIS — I10 ESSENTIAL HYPERTENSION: ICD-10-CM

## 2021-06-09 DIAGNOSIS — E78.00 PURE HYPERCHOLESTEROLEMIA: ICD-10-CM

## 2021-06-09 DIAGNOSIS — N63.20 LEFT BREAST MASS: ICD-10-CM

## 2021-06-09 DIAGNOSIS — R10.9 LEFT FLANK PAIN: Primary | ICD-10-CM

## 2021-06-09 PROCEDURE — 3079F DIAST BP 80-89 MM HG: CPT | Mod: CPTII,S$GLB,, | Performed by: FAMILY MEDICINE

## 2021-06-09 PROCEDURE — 3008F PR BODY MASS INDEX (BMI) DOCUMENTED: ICD-10-PCS | Mod: CPTII,S$GLB,, | Performed by: FAMILY MEDICINE

## 2021-06-09 PROCEDURE — 3074F SYST BP LT 130 MM HG: CPT | Mod: CPTII,S$GLB,, | Performed by: FAMILY MEDICINE

## 2021-06-09 PROCEDURE — 3044F HG A1C LEVEL LT 7.0%: CPT | Mod: CPTII,S$GLB,, | Performed by: FAMILY MEDICINE

## 2021-06-09 PROCEDURE — 3008F BODY MASS INDEX DOCD: CPT | Mod: CPTII,S$GLB,, | Performed by: FAMILY MEDICINE

## 2021-06-09 PROCEDURE — 99999 PR PBB SHADOW E&M-EST. PATIENT-LVL IV: CPT | Mod: PBBFAC,,, | Performed by: FAMILY MEDICINE

## 2021-06-09 PROCEDURE — 3074F PR MOST RECENT SYSTOLIC BLOOD PRESSURE < 130 MM HG: ICD-10-PCS | Mod: CPTII,S$GLB,, | Performed by: FAMILY MEDICINE

## 2021-06-09 PROCEDURE — 3044F PR MOST RECENT HEMOGLOBIN A1C LEVEL <7.0%: ICD-10-PCS | Mod: CPTII,S$GLB,, | Performed by: FAMILY MEDICINE

## 2021-06-09 PROCEDURE — 99999 PR PBB SHADOW E&M-EST. PATIENT-LVL IV: ICD-10-PCS | Mod: PBBFAC,,, | Performed by: FAMILY MEDICINE

## 2021-06-09 PROCEDURE — 1125F AMNT PAIN NOTED PAIN PRSNT: CPT | Mod: S$GLB,,, | Performed by: FAMILY MEDICINE

## 2021-06-09 PROCEDURE — 1125F PR PAIN SEVERITY QUANTIFIED, PAIN PRESENT: ICD-10-PCS | Mod: S$GLB,,, | Performed by: FAMILY MEDICINE

## 2021-06-09 PROCEDURE — 3079F PR MOST RECENT DIASTOLIC BLOOD PRESSURE 80-89 MM HG: ICD-10-PCS | Mod: CPTII,S$GLB,, | Performed by: FAMILY MEDICINE

## 2021-06-09 PROCEDURE — 99214 PR OFFICE/OUTPT VISIT, EST, LEVL IV, 30-39 MIN: ICD-10-PCS | Mod: S$GLB,,, | Performed by: FAMILY MEDICINE

## 2021-06-09 PROCEDURE — 99214 OFFICE O/P EST MOD 30 MIN: CPT | Mod: S$GLB,,, | Performed by: FAMILY MEDICINE

## 2021-06-09 RX ORDER — ZOSTER VACCINE RECOMBINANT, ADJUVANTED 50 MCG/0.5
KIT INTRAMUSCULAR
Qty: 1 EACH | Refills: 1 | Status: SHIPPED | OUTPATIENT
Start: 2021-06-09 | End: 2021-09-29

## 2021-06-14 ENCOUNTER — PATIENT OUTREACH (OUTPATIENT)
Dept: ADMINISTRATIVE | Facility: HOSPITAL | Age: 61
End: 2021-06-14

## 2021-07-23 ENCOUNTER — PATIENT MESSAGE (OUTPATIENT)
Dept: FAMILY MEDICINE | Facility: CLINIC | Age: 61
End: 2021-07-23

## 2021-07-23 DIAGNOSIS — E11.9 TYPE 2 DIABETES MELLITUS WITHOUT COMPLICATION, WITHOUT LONG-TERM CURRENT USE OF INSULIN: ICD-10-CM

## 2021-07-25 RX ORDER — METFORMIN HYDROCHLORIDE 500 MG/1
1000 TABLET, EXTENDED RELEASE ORAL DAILY
Qty: 180 TABLET | Refills: 2 | Status: SHIPPED | OUTPATIENT
Start: 2021-07-25 | End: 2021-12-29 | Stop reason: SDUPTHER

## 2021-09-29 ENCOUNTER — OFFICE VISIT (OUTPATIENT)
Dept: FAMILY MEDICINE | Facility: CLINIC | Age: 61
End: 2021-09-29
Payer: COMMERCIAL

## 2021-09-29 VITALS
BODY MASS INDEX: 47.82 KG/M2 | SYSTOLIC BLOOD PRESSURE: 120 MMHG | OXYGEN SATURATION: 98 % | RESPIRATION RATE: 18 BRPM | HEIGHT: 65 IN | DIASTOLIC BLOOD PRESSURE: 70 MMHG | HEART RATE: 83 BPM | WEIGHT: 287 LBS

## 2021-09-29 DIAGNOSIS — F17.210 NICOTINE DEPENDENCE, CIGARETTES, UNCOMPLICATED: ICD-10-CM

## 2021-09-29 DIAGNOSIS — R41.3 MEMORY CHANGES: ICD-10-CM

## 2021-09-29 DIAGNOSIS — K21.9 GASTROESOPHAGEAL REFLUX DISEASE WITHOUT ESOPHAGITIS: ICD-10-CM

## 2021-09-29 DIAGNOSIS — I10 ESSENTIAL HYPERTENSION: ICD-10-CM

## 2021-09-29 DIAGNOSIS — E11.9 TYPE 2 DIABETES MELLITUS WITHOUT COMPLICATION, WITHOUT LONG-TERM CURRENT USE OF INSULIN: ICD-10-CM

## 2021-09-29 DIAGNOSIS — E78.00 PURE HYPERCHOLESTEROLEMIA: Primary | ICD-10-CM

## 2021-09-29 PROBLEM — R10.9 LEFT FLANK PAIN: Status: RESOLVED | Noted: 2021-06-08 | Resolved: 2021-09-29

## 2021-09-29 PROBLEM — N63.20 LEFT BREAST MASS: Status: RESOLVED | Noted: 2021-06-08 | Resolved: 2021-09-29

## 2021-09-29 PROCEDURE — 3074F PR MOST RECENT SYSTOLIC BLOOD PRESSURE < 130 MM HG: ICD-10-PCS | Mod: CPTII,S$GLB,, | Performed by: FAMILY MEDICINE

## 2021-09-29 PROCEDURE — 3008F BODY MASS INDEX DOCD: CPT | Mod: CPTII,S$GLB,, | Performed by: FAMILY MEDICINE

## 2021-09-29 PROCEDURE — 3066F NEPHROPATHY DOC TX: CPT | Mod: CPTII,S$GLB,, | Performed by: FAMILY MEDICINE

## 2021-09-29 PROCEDURE — 3066F PR DOCUMENTATION OF TREATMENT FOR NEPHROPATHY: ICD-10-PCS | Mod: CPTII,S$GLB,, | Performed by: FAMILY MEDICINE

## 2021-09-29 PROCEDURE — 3008F PR BODY MASS INDEX (BMI) DOCUMENTED: ICD-10-PCS | Mod: CPTII,S$GLB,, | Performed by: FAMILY MEDICINE

## 2021-09-29 PROCEDURE — 3061F NEG MICROALBUMINURIA REV: CPT | Mod: CPTII,S$GLB,, | Performed by: FAMILY MEDICINE

## 2021-09-29 PROCEDURE — 3044F PR MOST RECENT HEMOGLOBIN A1C LEVEL <7.0%: ICD-10-PCS | Mod: CPTII,S$GLB,, | Performed by: FAMILY MEDICINE

## 2021-09-29 PROCEDURE — 1159F MED LIST DOCD IN RCRD: CPT | Mod: CPTII,S$GLB,, | Performed by: FAMILY MEDICINE

## 2021-09-29 PROCEDURE — 3078F PR MOST RECENT DIASTOLIC BLOOD PRESSURE < 80 MM HG: ICD-10-PCS | Mod: CPTII,S$GLB,, | Performed by: FAMILY MEDICINE

## 2021-09-29 PROCEDURE — 3074F SYST BP LT 130 MM HG: CPT | Mod: CPTII,S$GLB,, | Performed by: FAMILY MEDICINE

## 2021-09-29 PROCEDURE — 1159F PR MEDICATION LIST DOCUMENTED IN MEDICAL RECORD: ICD-10-PCS | Mod: CPTII,S$GLB,, | Performed by: FAMILY MEDICINE

## 2021-09-29 PROCEDURE — 3061F PR NEG MICROALBUMINURIA RESULT DOCUMENTED/REVIEW: ICD-10-PCS | Mod: CPTII,S$GLB,, | Performed by: FAMILY MEDICINE

## 2021-09-29 PROCEDURE — 3078F DIAST BP <80 MM HG: CPT | Mod: CPTII,S$GLB,, | Performed by: FAMILY MEDICINE

## 2021-09-29 PROCEDURE — 99214 PR OFFICE/OUTPT VISIT, EST, LEVL IV, 30-39 MIN: ICD-10-PCS | Mod: S$GLB,,, | Performed by: FAMILY MEDICINE

## 2021-09-29 PROCEDURE — 1160F RVW MEDS BY RX/DR IN RCRD: CPT | Mod: CPTII,S$GLB,, | Performed by: FAMILY MEDICINE

## 2021-09-29 PROCEDURE — 99999 PR PBB SHADOW E&M-EST. PATIENT-LVL V: CPT | Mod: PBBFAC,,, | Performed by: FAMILY MEDICINE

## 2021-09-29 PROCEDURE — 99214 OFFICE O/P EST MOD 30 MIN: CPT | Mod: S$GLB,,, | Performed by: FAMILY MEDICINE

## 2021-09-29 PROCEDURE — 99999 PR PBB SHADOW E&M-EST. PATIENT-LVL V: ICD-10-PCS | Mod: PBBFAC,,, | Performed by: FAMILY MEDICINE

## 2021-09-29 PROCEDURE — 1160F PR REVIEW ALL MEDS BY PRESCRIBER/CLIN PHARMACIST DOCUMENTED: ICD-10-PCS | Mod: CPTII,S$GLB,, | Performed by: FAMILY MEDICINE

## 2021-09-29 PROCEDURE — 3044F HG A1C LEVEL LT 7.0%: CPT | Mod: CPTII,S$GLB,, | Performed by: FAMILY MEDICINE

## 2021-09-29 RX ORDER — UBIDECARENONE 30 MG
30 CAPSULE ORAL DAILY
COMMUNITY

## 2021-09-29 RX ORDER — ZOSTER VACCINE RECOMBINANT, ADJUVANTED 50 MCG/0.5
KIT INTRAMUSCULAR
Qty: 1 EACH | Refills: 1 | Status: CANCELLED | OUTPATIENT
Start: 2021-09-29

## 2021-10-21 ENCOUNTER — OFFICE VISIT (OUTPATIENT)
Dept: NEUROLOGY | Facility: CLINIC | Age: 61
End: 2021-10-21

## 2021-10-21 DIAGNOSIS — F41.9 ANXIETY: Primary | ICD-10-CM

## 2021-10-21 DIAGNOSIS — R41.3 MEMORY CHANGES: ICD-10-CM

## 2021-10-21 PROCEDURE — 90791 PSYCH DIAGNOSTIC EVALUATION: CPT | Mod: 95,,, | Performed by: CLINICAL NEUROPSYCHOLOGIST

## 2021-10-21 PROCEDURE — 99499 NO LOS: ICD-10-PCS | Mod: 95,,, | Performed by: CLINICAL NEUROPSYCHOLOGIST

## 2021-10-21 PROCEDURE — 99499 UNLISTED E&M SERVICE: CPT | Mod: 95,,, | Performed by: CLINICAL NEUROPSYCHOLOGIST

## 2021-10-21 PROCEDURE — 90791 PR PSYCHIATRIC DIAGNOSTIC EVALUATION: ICD-10-PCS | Mod: 95,,, | Performed by: CLINICAL NEUROPSYCHOLOGIST

## 2021-11-01 ENCOUNTER — OFFICE VISIT (OUTPATIENT)
Dept: NEUROLOGY | Facility: CLINIC | Age: 61
End: 2021-11-01
Payer: COMMERCIAL

## 2021-11-01 DIAGNOSIS — F43.23 ADJUSTMENT DISORDER WITH MIXED ANXIETY AND DEPRESSED MOOD: ICD-10-CM

## 2021-11-01 DIAGNOSIS — R41.3 MEMORY CHANGES: ICD-10-CM

## 2021-11-01 PROCEDURE — 96133 NRPSYC TST EVAL PHYS/QHP EA: CPT | Mod: S$GLB,,, | Performed by: CLINICAL NEUROPSYCHOLOGIST

## 2021-11-01 PROCEDURE — 99999 PR PBB SHADOW E&M-EST. PATIENT-LVL I: CPT | Mod: PBBFAC,,,

## 2021-11-01 PROCEDURE — 99999 PR PBB SHADOW E&M-EST. PATIENT-LVL I: ICD-10-PCS | Mod: PBBFAC,,,

## 2021-11-01 PROCEDURE — 99499 NO LOS: ICD-10-PCS | Mod: S$GLB,,, | Performed by: CLINICAL NEUROPSYCHOLOGIST

## 2021-11-01 PROCEDURE — 99499 UNLISTED E&M SERVICE: CPT | Mod: S$GLB,,, | Performed by: CLINICAL NEUROPSYCHOLOGIST

## 2021-11-01 PROCEDURE — 99211 OFF/OP EST MAY X REQ PHY/QHP: CPT | Mod: PBBFAC

## 2021-11-01 PROCEDURE — 96132 NRPSYC TST EVAL PHYS/QHP 1ST: CPT | Mod: S$GLB,,, | Performed by: CLINICAL NEUROPSYCHOLOGIST

## 2021-11-01 PROCEDURE — 96132 PR NEUROPSYCHOLOGIC TEST EVAL SVCS, 1ST HR: ICD-10-PCS | Mod: S$GLB,,, | Performed by: CLINICAL NEUROPSYCHOLOGIST

## 2021-11-01 PROCEDURE — 96139 PR PSYCH/NEUROPSYCH TEST ADMIN/SCORING, BY TECH, 2+ TESTS, EA ADDTL 30 MIN: ICD-10-PCS | Mod: S$GLB,,, | Performed by: CLINICAL NEUROPSYCHOLOGIST

## 2021-11-01 PROCEDURE — 96133 PR NEUROPSYCHOLOGIC TEST EVAL SVCS, EA ADDTL HR: ICD-10-PCS | Mod: S$GLB,,, | Performed by: CLINICAL NEUROPSYCHOLOGIST

## 2021-11-01 PROCEDURE — 96138 PSYCL/NRPSYC TECH 1ST: CPT | Mod: S$GLB,,, | Performed by: CLINICAL NEUROPSYCHOLOGIST

## 2021-11-01 PROCEDURE — 96139 PSYCL/NRPSYC TST TECH EA: CPT | Mod: S$GLB,,, | Performed by: CLINICAL NEUROPSYCHOLOGIST

## 2021-11-01 PROCEDURE — 96138 PR PSYCH/NEUROPSYCH TEST ADMIN/SCORING, BY TECH, 2+ TESTS, 1ST 30 MIN: ICD-10-PCS | Mod: S$GLB,,, | Performed by: CLINICAL NEUROPSYCHOLOGIST

## 2021-11-03 ENCOUNTER — PATIENT MESSAGE (OUTPATIENT)
Dept: NEUROLOGY | Facility: CLINIC | Age: 61
End: 2021-11-03
Payer: COMMERCIAL

## 2021-11-03 PROBLEM — F43.23 ADJUSTMENT DISORDER WITH MIXED ANXIETY AND DEPRESSED MOOD: Status: ACTIVE | Noted: 2021-11-03

## 2021-11-12 ENCOUNTER — OFFICE VISIT (OUTPATIENT)
Dept: NEUROLOGY | Facility: CLINIC | Age: 61
End: 2021-11-12
Payer: COMMERCIAL

## 2021-11-12 DIAGNOSIS — R41.3 MEMORY CHANGES: ICD-10-CM

## 2021-11-12 DIAGNOSIS — F43.23 ADJUSTMENT DISORDER WITH MIXED ANXIETY AND DEPRESSED MOOD: ICD-10-CM

## 2021-11-12 PROCEDURE — 99499 NO LOS: ICD-10-PCS | Mod: 95,,, | Performed by: CLINICAL NEUROPSYCHOLOGIST

## 2021-11-12 PROCEDURE — 3066F NEPHROPATHY DOC TX: CPT | Mod: CPTII,95,, | Performed by: CLINICAL NEUROPSYCHOLOGIST

## 2021-11-12 PROCEDURE — 3044F HG A1C LEVEL LT 7.0%: CPT | Mod: CPTII,95,, | Performed by: CLINICAL NEUROPSYCHOLOGIST

## 2021-11-12 PROCEDURE — 3061F NEG MICROALBUMINURIA REV: CPT | Mod: CPTII,95,, | Performed by: CLINICAL NEUROPSYCHOLOGIST

## 2021-11-12 PROCEDURE — 99499 UNLISTED E&M SERVICE: CPT | Mod: 95,,, | Performed by: CLINICAL NEUROPSYCHOLOGIST

## 2021-11-12 PROCEDURE — 3044F PR MOST RECENT HEMOGLOBIN A1C LEVEL <7.0%: ICD-10-PCS | Mod: CPTII,95,, | Performed by: CLINICAL NEUROPSYCHOLOGIST

## 2021-11-12 PROCEDURE — 3066F PR DOCUMENTATION OF TREATMENT FOR NEPHROPATHY: ICD-10-PCS | Mod: CPTII,95,, | Performed by: CLINICAL NEUROPSYCHOLOGIST

## 2021-11-12 PROCEDURE — 3061F PR NEG MICROALBUMINURIA RESULT DOCUMENTED/REVIEW: ICD-10-PCS | Mod: CPTII,95,, | Performed by: CLINICAL NEUROPSYCHOLOGIST

## 2021-11-17 ENCOUNTER — PATIENT MESSAGE (OUTPATIENT)
Dept: NEUROLOGY | Facility: CLINIC | Age: 61
End: 2021-11-17
Payer: COMMERCIAL

## 2021-11-18 ENCOUNTER — PATIENT MESSAGE (OUTPATIENT)
Dept: PSYCHIATRY | Facility: CLINIC | Age: 61
End: 2021-11-18
Payer: COMMERCIAL

## 2021-12-10 ENCOUNTER — PATIENT OUTREACH (OUTPATIENT)
Dept: ADMINISTRATIVE | Facility: OTHER | Age: 61
End: 2021-12-10
Payer: COMMERCIAL

## 2021-12-14 ENCOUNTER — OFFICE VISIT (OUTPATIENT)
Dept: UROLOGY | Facility: CLINIC | Age: 61
End: 2021-12-14
Payer: COMMERCIAL

## 2021-12-14 VITALS
HEART RATE: 75 BPM | HEIGHT: 65 IN | OXYGEN SATURATION: 97 % | WEIGHT: 287 LBS | BODY MASS INDEX: 47.82 KG/M2 | DIASTOLIC BLOOD PRESSURE: 68 MMHG | SYSTOLIC BLOOD PRESSURE: 124 MMHG

## 2021-12-14 DIAGNOSIS — D35.02 ADRENAL ADENOMA, LEFT: ICD-10-CM

## 2021-12-14 DIAGNOSIS — R10.9 FLANK PAIN: ICD-10-CM

## 2021-12-14 DIAGNOSIS — N22 CALCULUS OF URINARY TRACT IN DISEASES CLASSIFIED ELSEWHERE: Primary | ICD-10-CM

## 2021-12-14 DIAGNOSIS — Z87.442 HISTORY OF NEPHROLITHIASIS: ICD-10-CM

## 2021-12-14 PROCEDURE — 99999 PR PBB SHADOW E&M-EST. PATIENT-LVL III: ICD-10-PCS | Mod: PBBFAC,,, | Performed by: UROLOGY

## 2021-12-14 PROCEDURE — 3061F PR NEG MICROALBUMINURIA RESULT DOCUMENTED/REVIEW: ICD-10-PCS | Mod: CPTII,S$GLB,, | Performed by: UROLOGY

## 2021-12-14 PROCEDURE — 99214 OFFICE O/P EST MOD 30 MIN: CPT | Mod: S$GLB,,, | Performed by: UROLOGY

## 2021-12-14 PROCEDURE — 3066F NEPHROPATHY DOC TX: CPT | Mod: CPTII,S$GLB,, | Performed by: UROLOGY

## 2021-12-14 PROCEDURE — 3061F NEG MICROALBUMINURIA REV: CPT | Mod: CPTII,S$GLB,, | Performed by: UROLOGY

## 2021-12-14 PROCEDURE — 3066F PR DOCUMENTATION OF TREATMENT FOR NEPHROPATHY: ICD-10-PCS | Mod: CPTII,S$GLB,, | Performed by: UROLOGY

## 2021-12-14 PROCEDURE — 99999 PR PBB SHADOW E&M-EST. PATIENT-LVL III: CPT | Mod: PBBFAC,,, | Performed by: UROLOGY

## 2021-12-14 PROCEDURE — 99214 PR OFFICE/OUTPT VISIT, EST, LEVL IV, 30-39 MIN: ICD-10-PCS | Mod: S$GLB,,, | Performed by: UROLOGY

## 2021-12-14 RX ORDER — TAMSULOSIN HYDROCHLORIDE 0.4 MG/1
0.4 CAPSULE ORAL DAILY
Qty: 30 CAPSULE | Refills: 11 | Status: SHIPPED | OUTPATIENT
Start: 2021-12-14 | End: 2023-01-03 | Stop reason: SDUPTHER

## 2021-12-14 RX ORDER — KETOROLAC TROMETHAMINE 10 MG/1
10 TABLET, FILM COATED ORAL EVERY 6 HOURS
Qty: 20 TABLET | Refills: 1 | Status: SHIPPED | OUTPATIENT
Start: 2021-12-14 | End: 2021-12-19

## 2021-12-28 PROBLEM — R91.1 PULMONARY NODULE 1 CM OR GREATER IN DIAMETER: Status: ACTIVE | Noted: 2021-12-28

## 2021-12-29 ENCOUNTER — OFFICE VISIT (OUTPATIENT)
Dept: FAMILY MEDICINE | Facility: CLINIC | Age: 61
End: 2021-12-29
Payer: COMMERCIAL

## 2021-12-29 VITALS
SYSTOLIC BLOOD PRESSURE: 122 MMHG | HEART RATE: 89 BPM | HEIGHT: 65 IN | DIASTOLIC BLOOD PRESSURE: 78 MMHG | BODY MASS INDEX: 47.95 KG/M2 | RESPIRATION RATE: 18 BRPM | WEIGHT: 287.81 LBS | OXYGEN SATURATION: 99 %

## 2021-12-29 DIAGNOSIS — E78.5 HYPERLIPIDEMIA ASSOCIATED WITH TYPE 2 DIABETES MELLITUS: ICD-10-CM

## 2021-12-29 DIAGNOSIS — E11.69 HYPERLIPIDEMIA ASSOCIATED WITH TYPE 2 DIABETES MELLITUS: ICD-10-CM

## 2021-12-29 DIAGNOSIS — K21.9 GASTROESOPHAGEAL REFLUX DISEASE WITHOUT ESOPHAGITIS: ICD-10-CM

## 2021-12-29 DIAGNOSIS — E11.9 TYPE 2 DIABETES MELLITUS WITHOUT COMPLICATION, WITHOUT LONG-TERM CURRENT USE OF INSULIN: ICD-10-CM

## 2021-12-29 DIAGNOSIS — M53.3 COCCYDYNIA: ICD-10-CM

## 2021-12-29 DIAGNOSIS — R91.1 PULMONARY NODULE 1 CM OR GREATER IN DIAMETER: Primary | ICD-10-CM

## 2021-12-29 DIAGNOSIS — R41.3 MEMORY CHANGES: ICD-10-CM

## 2021-12-29 DIAGNOSIS — F43.23 ADJUSTMENT DISORDER WITH MIXED ANXIETY AND DEPRESSED MOOD: ICD-10-CM

## 2021-12-29 DIAGNOSIS — F17.210 NICOTINE DEPENDENCE, CIGARETTES, UNCOMPLICATED: ICD-10-CM

## 2021-12-29 DIAGNOSIS — M25.641 DECREASED RANGE OF MOTION OF FINGER OF RIGHT HAND: ICD-10-CM

## 2021-12-29 DIAGNOSIS — I15.2 HYPERTENSION ASSOCIATED WITH TYPE 2 DIABETES MELLITUS: ICD-10-CM

## 2021-12-29 DIAGNOSIS — E11.59 HYPERTENSION ASSOCIATED WITH TYPE 2 DIABETES MELLITUS: ICD-10-CM

## 2021-12-29 DIAGNOSIS — D17.79 MYELOLIPOMA OF LEFT ADRENAL GLAND: ICD-10-CM

## 2021-12-29 DIAGNOSIS — D64.9 ANEMIA, UNSPECIFIED TYPE: ICD-10-CM

## 2021-12-29 PROCEDURE — 1159F MED LIST DOCD IN RCRD: CPT | Mod: CPTII,S$GLB,, | Performed by: FAMILY MEDICINE

## 2021-12-29 PROCEDURE — 3008F PR BODY MASS INDEX (BMI) DOCUMENTED: ICD-10-PCS | Mod: CPTII,S$GLB,, | Performed by: FAMILY MEDICINE

## 2021-12-29 PROCEDURE — 3061F PR NEG MICROALBUMINURIA RESULT DOCUMENTED/REVIEW: ICD-10-PCS | Mod: CPTII,S$GLB,, | Performed by: FAMILY MEDICINE

## 2021-12-29 PROCEDURE — 99999 PR PBB SHADOW E&M-EST. PATIENT-LVL V: ICD-10-PCS | Mod: PBBFAC,,, | Performed by: FAMILY MEDICINE

## 2021-12-29 PROCEDURE — 99214 PR OFFICE/OUTPT VISIT, EST, LEVL IV, 30-39 MIN: ICD-10-PCS | Mod: S$GLB,,, | Performed by: FAMILY MEDICINE

## 2021-12-29 PROCEDURE — 3044F HG A1C LEVEL LT 7.0%: CPT | Mod: CPTII,S$GLB,, | Performed by: FAMILY MEDICINE

## 2021-12-29 PROCEDURE — 3074F SYST BP LT 130 MM HG: CPT | Mod: CPTII,S$GLB,, | Performed by: FAMILY MEDICINE

## 2021-12-29 PROCEDURE — 3074F PR MOST RECENT SYSTOLIC BLOOD PRESSURE < 130 MM HG: ICD-10-PCS | Mod: CPTII,S$GLB,, | Performed by: FAMILY MEDICINE

## 2021-12-29 PROCEDURE — 3044F PR MOST RECENT HEMOGLOBIN A1C LEVEL <7.0%: ICD-10-PCS | Mod: CPTII,S$GLB,, | Performed by: FAMILY MEDICINE

## 2021-12-29 PROCEDURE — 99999 PR PBB SHADOW E&M-EST. PATIENT-LVL V: CPT | Mod: PBBFAC,,, | Performed by: FAMILY MEDICINE

## 2021-12-29 PROCEDURE — 1159F PR MEDICATION LIST DOCUMENTED IN MEDICAL RECORD: ICD-10-PCS | Mod: CPTII,S$GLB,, | Performed by: FAMILY MEDICINE

## 2021-12-29 PROCEDURE — 3061F NEG MICROALBUMINURIA REV: CPT | Mod: CPTII,S$GLB,, | Performed by: FAMILY MEDICINE

## 2021-12-29 PROCEDURE — 3078F PR MOST RECENT DIASTOLIC BLOOD PRESSURE < 80 MM HG: ICD-10-PCS | Mod: CPTII,S$GLB,, | Performed by: FAMILY MEDICINE

## 2021-12-29 PROCEDURE — 3066F PR DOCUMENTATION OF TREATMENT FOR NEPHROPATHY: ICD-10-PCS | Mod: CPTII,S$GLB,, | Performed by: FAMILY MEDICINE

## 2021-12-29 PROCEDURE — 3078F DIAST BP <80 MM HG: CPT | Mod: CPTII,S$GLB,, | Performed by: FAMILY MEDICINE

## 2021-12-29 PROCEDURE — 3066F NEPHROPATHY DOC TX: CPT | Mod: CPTII,S$GLB,, | Performed by: FAMILY MEDICINE

## 2021-12-29 PROCEDURE — 3008F BODY MASS INDEX DOCD: CPT | Mod: CPTII,S$GLB,, | Performed by: FAMILY MEDICINE

## 2021-12-29 PROCEDURE — 99214 OFFICE O/P EST MOD 30 MIN: CPT | Mod: S$GLB,,, | Performed by: FAMILY MEDICINE

## 2021-12-29 RX ORDER — METFORMIN HYDROCHLORIDE 500 MG/1
1000 TABLET, EXTENDED RELEASE ORAL DAILY
Qty: 180 TABLET | Refills: 2 | Status: SHIPPED | OUTPATIENT
Start: 2021-12-29 | End: 2023-04-06 | Stop reason: SDUPTHER

## 2021-12-29 RX ORDER — LOSARTAN POTASSIUM AND HYDROCHLOROTHIAZIDE 12.5; 5 MG/1; MG/1
1 TABLET ORAL 2 TIMES DAILY
Qty: 180 TABLET | Refills: 3 | Status: SHIPPED | OUTPATIENT
Start: 2021-12-29 | End: 2022-01-13 | Stop reason: SDUPTHER

## 2021-12-29 RX ORDER — ZOSTER VACCINE RECOMBINANT, ADJUVANTED 50 MCG/0.5
KIT INTRAMUSCULAR
Qty: 1 EACH | Refills: 1 | Status: CANCELLED | OUTPATIENT
Start: 2021-12-29

## 2021-12-29 RX ORDER — FERROUS SULFATE 325(65) MG
325 TABLET ORAL DAILY
Qty: 90 TABLET | Refills: 3 | Status: SHIPPED | OUTPATIENT
Start: 2021-12-29 | End: 2022-12-29

## 2021-12-29 RX ORDER — LOSARTAN POTASSIUM AND HYDROCHLOROTHIAZIDE 25; 100 MG/1; MG/1
1 TABLET ORAL DAILY
Qty: 90 TABLET | Refills: 3 | Status: SHIPPED | OUTPATIENT
Start: 2021-12-29 | End: 2021-12-29

## 2021-12-29 RX ORDER — FOLIC ACID 1 MG/1
1000 TABLET ORAL DAILY
Qty: 90 TABLET | Refills: 3 | Status: SHIPPED | OUTPATIENT
Start: 2021-12-29 | End: 2023-05-15 | Stop reason: SDUPTHER

## 2021-12-29 RX ORDER — ROSUVASTATIN CALCIUM 10 MG/1
10 TABLET, COATED ORAL NIGHTLY
Qty: 90 TABLET | Refills: 2 | Status: SHIPPED | OUTPATIENT
Start: 2021-12-29 | End: 2022-12-06

## 2021-12-29 RX ORDER — OMEPRAZOLE 40 MG/1
40 CAPSULE, DELAYED RELEASE ORAL EVERY MORNING
Qty: 90 CAPSULE | Refills: 3 | Status: SHIPPED | OUTPATIENT
Start: 2021-12-29 | End: 2023-04-10 | Stop reason: SDUPTHER

## 2022-01-05 PROBLEM — R53.1 WEAKNESS: Status: ACTIVE | Noted: 2022-01-05

## 2022-01-05 PROBLEM — M25.649 DECREASED RANGE OF MOTION OF FINGER: Status: ACTIVE | Noted: 2021-12-29

## 2022-01-11 ENCOUNTER — PATIENT OUTREACH (OUTPATIENT)
Dept: ADMINISTRATIVE | Facility: OTHER | Age: 62
End: 2022-01-11
Payer: COMMERCIAL

## 2022-01-11 NOTE — PROGRESS NOTES
Health Maintenance Due   Topic Date Due    TETANUS VACCINE  Never done    Shingles Vaccine (1 of 2) Never done     Updates were requested from care everywhere.  Chart was reviewed for overdue Proactive [facility] Encounters ([facility]) topics (CRS, Breast Cancer Screening, Eye exam)  Health Maintenance has been updated.  LINKS immunization registry triggered.  Immunizations were reconciled.

## 2022-01-13 ENCOUNTER — OFFICE VISIT (OUTPATIENT)
Dept: PULMONOLOGY | Facility: CLINIC | Age: 62
End: 2022-01-13
Payer: COMMERCIAL

## 2022-01-13 VITALS
HEIGHT: 65 IN | BODY MASS INDEX: 48.19 KG/M2 | SYSTOLIC BLOOD PRESSURE: 120 MMHG | DIASTOLIC BLOOD PRESSURE: 78 MMHG | WEIGHT: 289.25 LBS | HEART RATE: 97 BPM | OXYGEN SATURATION: 100 %

## 2022-01-13 DIAGNOSIS — F17.210 NICOTINE DEPENDENCE, CIGARETTES, UNCOMPLICATED: ICD-10-CM

## 2022-01-13 DIAGNOSIS — R91.1 PULMONARY NODULE 1 CM OR GREATER IN DIAMETER: ICD-10-CM

## 2022-01-13 PROCEDURE — 3008F BODY MASS INDEX DOCD: CPT | Mod: CPTII,S$GLB,, | Performed by: NURSE PRACTITIONER

## 2022-01-13 PROCEDURE — 3074F SYST BP LT 130 MM HG: CPT | Mod: CPTII,S$GLB,, | Performed by: NURSE PRACTITIONER

## 2022-01-13 PROCEDURE — 3078F PR MOST RECENT DIASTOLIC BLOOD PRESSURE < 80 MM HG: ICD-10-PCS | Mod: CPTII,S$GLB,, | Performed by: NURSE PRACTITIONER

## 2022-01-13 PROCEDURE — 3078F DIAST BP <80 MM HG: CPT | Mod: CPTII,S$GLB,, | Performed by: NURSE PRACTITIONER

## 2022-01-13 PROCEDURE — 99204 OFFICE O/P NEW MOD 45 MIN: CPT | Mod: S$GLB,,, | Performed by: NURSE PRACTITIONER

## 2022-01-13 PROCEDURE — 3008F PR BODY MASS INDEX (BMI) DOCUMENTED: ICD-10-PCS | Mod: CPTII,S$GLB,, | Performed by: NURSE PRACTITIONER

## 2022-01-13 PROCEDURE — 3074F PR MOST RECENT SYSTOLIC BLOOD PRESSURE < 130 MM HG: ICD-10-PCS | Mod: CPTII,S$GLB,, | Performed by: NURSE PRACTITIONER

## 2022-01-13 PROCEDURE — 99204 PR OFFICE/OUTPT VISIT, NEW, LEVL IV, 45-59 MIN: ICD-10-PCS | Mod: S$GLB,,, | Performed by: NURSE PRACTITIONER

## 2022-01-13 PROCEDURE — 1160F RVW MEDS BY RX/DR IN RCRD: CPT | Mod: CPTII,S$GLB,, | Performed by: NURSE PRACTITIONER

## 2022-01-13 PROCEDURE — 1159F PR MEDICATION LIST DOCUMENTED IN MEDICAL RECORD: ICD-10-PCS | Mod: CPTII,S$GLB,, | Performed by: NURSE PRACTITIONER

## 2022-01-13 PROCEDURE — 99999 PR PBB SHADOW E&M-EST. PATIENT-LVL IV: CPT | Mod: PBBFAC,,, | Performed by: NURSE PRACTITIONER

## 2022-01-13 PROCEDURE — 1159F MED LIST DOCD IN RCRD: CPT | Mod: CPTII,S$GLB,, | Performed by: NURSE PRACTITIONER

## 2022-01-13 PROCEDURE — 99999 PR PBB SHADOW E&M-EST. PATIENT-LVL IV: ICD-10-PCS | Mod: PBBFAC,,, | Performed by: NURSE PRACTITIONER

## 2022-01-13 PROCEDURE — 1160F PR REVIEW ALL MEDS BY PRESCRIBER/CLIN PHARMACIST DOCUMENTED: ICD-10-PCS | Mod: CPTII,S$GLB,, | Performed by: NURSE PRACTITIONER

## 2022-01-13 RX ORDER — LOSARTAN POTASSIUM AND HYDROCHLOROTHIAZIDE 25; 100 MG/1; MG/1
1 TABLET ORAL DAILY
COMMUNITY
Start: 2021-12-29 | End: 2022-01-18 | Stop reason: SDUPTHER

## 2022-01-14 ENCOUNTER — TELEPHONE (OUTPATIENT)
Dept: FAMILY MEDICINE | Facility: CLINIC | Age: 62
End: 2022-01-14
Payer: COMMERCIAL

## 2022-01-14 DIAGNOSIS — M25.641 DECREASED RANGE OF MOTION OF FINGER OF RIGHT HAND: Primary | ICD-10-CM

## 2022-01-14 NOTE — TELEPHONE ENCOUNTER
Please call Aminta [place] and let her know that her OT noted concerns with her tendons in her hand and recommended that she see Ortho. Hand. I placed a referral     Orders Placed This Encounter    Ambulatory referral/consult to Orthopedics     Dr. Verenice Chase D.O.   [facility]

## 2022-01-14 NOTE — ASSESSMENT & PLAN NOTE
Personally reviewed discussed CT of chest results with both patient and .  Discussed options for CT follow vs PET CT vs biopsy.   After long discussion, She would like to follow up with CT of chest - since it has been 1 month from last CT- she would be due for CT in March 2022.   I think her decision to have follow-up CT in 3 months is reasonable considering we do not have chronicity of chest CT.    She is to follow up after having CT.

## 2022-01-15 ENCOUNTER — PATIENT MESSAGE (OUTPATIENT)
Dept: FAMILY MEDICINE | Facility: CLINIC | Age: 62
End: 2022-01-15
Payer: COMMERCIAL

## 2022-01-15 DIAGNOSIS — E11.59 HYPERTENSION ASSOCIATED WITH TYPE 2 DIABETES MELLITUS: Primary | ICD-10-CM

## 2022-01-15 DIAGNOSIS — I15.2 HYPERTENSION ASSOCIATED WITH TYPE 2 DIABETES MELLITUS: Primary | ICD-10-CM

## 2022-01-18 ENCOUNTER — PATIENT MESSAGE (OUTPATIENT)
Dept: FAMILY MEDICINE | Facility: CLINIC | Age: 62
End: 2022-01-18
Payer: COMMERCIAL

## 2022-01-18 DIAGNOSIS — I15.2 HYPERTENSION ASSOCIATED WITH TYPE 2 DIABETES MELLITUS: Primary | ICD-10-CM

## 2022-01-18 DIAGNOSIS — E11.59 HYPERTENSION ASSOCIATED WITH TYPE 2 DIABETES MELLITUS: Primary | ICD-10-CM

## 2022-01-18 RX ORDER — HYDROCHLOROTHIAZIDE 25 MG/1
25 TABLET ORAL DAILY
Qty: 90 TABLET | Refills: 3 | Status: SHIPPED | OUTPATIENT
Start: 2022-01-18 | End: 2022-01-19 | Stop reason: ALTCHOICE

## 2022-01-18 RX ORDER — LOSARTAN POTASSIUM 50 MG/1
50 TABLET ORAL 2 TIMES DAILY
Qty: 180 TABLET | Refills: 3 | Status: SHIPPED | OUTPATIENT
Start: 2022-01-18 | End: 2022-01-19 | Stop reason: ALTCHOICE

## 2022-01-19 RX ORDER — LOSARTAN POTASSIUM AND HYDROCHLOROTHIAZIDE 12.5; 5 MG/1; MG/1
1 TABLET ORAL 2 TIMES DAILY
Qty: 180 TABLET | Refills: 3 | Status: SHIPPED | OUTPATIENT
Start: 2022-01-19 | End: 2023-04-06 | Stop reason: SDUPTHER

## 2022-02-03 ENCOUNTER — PATIENT MESSAGE (OUTPATIENT)
Dept: FAMILY MEDICINE | Facility: CLINIC | Age: 62
End: 2022-02-03
Payer: COMMERCIAL

## 2022-02-07 ENCOUNTER — OFFICE VISIT (OUTPATIENT)
Dept: ORTHOPEDICS | Facility: CLINIC | Age: 62
End: 2022-02-07
Payer: COMMERCIAL

## 2022-02-07 ENCOUNTER — HOSPITAL ENCOUNTER (OUTPATIENT)
Dept: RADIOLOGY | Facility: HOSPITAL | Age: 62
Discharge: HOME | End: 2022-02-07
Attending: ORTHOPAEDIC SURGERY
Payer: COMMERCIAL

## 2022-02-07 ENCOUNTER — TELEPHONE (OUTPATIENT)
Dept: ORTHOPEDICS | Facility: CLINIC | Age: 62
End: 2022-02-07
Payer: COMMERCIAL

## 2022-02-07 VITALS — WEIGHT: 289 LBS | HEIGHT: 65 IN | BODY MASS INDEX: 48.15 KG/M2

## 2022-02-07 DIAGNOSIS — M79.641 RIGHT HAND PAIN: Primary | ICD-10-CM

## 2022-02-07 DIAGNOSIS — M25.641 DECREASED RANGE OF MOTION OF FINGER OF RIGHT HAND: ICD-10-CM

## 2022-02-07 DIAGNOSIS — M79.641 RIGHT HAND PAIN: ICD-10-CM

## 2022-02-07 PROCEDURE — 73130 XR HAND COMPLETE 3 VIEW RIGHT: ICD-10-PCS | Mod: 26,RT,, | Performed by: RADIOLOGY

## 2022-02-07 PROCEDURE — 73130 X-RAY EXAM OF HAND: CPT | Mod: 26,RT,, | Performed by: RADIOLOGY

## 2022-02-07 PROCEDURE — 99203 PR OFFICE/OUTPT VISIT, NEW, LEVL III, 30-44 MIN: ICD-10-PCS | Mod: S$GLB,,, | Performed by: ORTHOPAEDIC SURGERY

## 2022-02-07 PROCEDURE — 73130 X-RAY EXAM OF HAND: CPT | Mod: TC,PN,RT

## 2022-02-07 PROCEDURE — 3008F PR BODY MASS INDEX (BMI) DOCUMENTED: ICD-10-PCS | Mod: CPTII,S$GLB,, | Performed by: ORTHOPAEDIC SURGERY

## 2022-02-07 PROCEDURE — 99999 PR PBB SHADOW E&M-EST. PATIENT-LVL IV: ICD-10-PCS | Mod: PBBFAC,,, | Performed by: ORTHOPAEDIC SURGERY

## 2022-02-07 PROCEDURE — 99999 PR PBB SHADOW E&M-EST. PATIENT-LVL IV: CPT | Mod: PBBFAC,,, | Performed by: ORTHOPAEDIC SURGERY

## 2022-02-07 PROCEDURE — 99203 OFFICE O/P NEW LOW 30 MIN: CPT | Mod: S$GLB,,, | Performed by: ORTHOPAEDIC SURGERY

## 2022-02-07 PROCEDURE — 1159F PR MEDICATION LIST DOCUMENTED IN MEDICAL RECORD: ICD-10-PCS | Mod: CPTII,S$GLB,, | Performed by: ORTHOPAEDIC SURGERY

## 2022-02-07 PROCEDURE — 3008F BODY MASS INDEX DOCD: CPT | Mod: CPTII,S$GLB,, | Performed by: ORTHOPAEDIC SURGERY

## 2022-02-07 PROCEDURE — 1159F MED LIST DOCD IN RCRD: CPT | Mod: CPTII,S$GLB,, | Performed by: ORTHOPAEDIC SURGERY

## 2022-02-07 NOTE — PROGRESS NOTES
Subjective:      Patient ID: Aminta Schreiber is a 61 y.o. female.    Chief Complaint: Consult (decresed range of motion in Right hand)      HPI  Aminta Schreiber is a  61 y.o. female presenting today for right index finger weakness.  There was not a history of trauma.  Onset of symptoms began several months ago  No history of trauma  She is not really having any pain she does notice that her right index finger did not extend fully and this was a concern  She has started physical therapy had 1 or 2 visits without much improvement  .      Review of patient's allergies indicates:  No Known Allergies      Current Outpatient Medications   Medication Sig Dispense Refill    ascorbic acid, vitamin C, (VITAMIN C) 1000 MG tablet Take 1,000 mg by mouth once daily.      cholecalciferol, vitamin D3, (VITAMIN D3) 50 mcg (2,000 unit) Cap Take 1 capsule (2,000 Units total) by mouth once daily. 90 capsule 2    co-enzyme Q-10 30 mg capsule Take 30 mg by mouth once daily.      cyanocobalamin (VITAMIN B-12) 1000 MCG tablet Take 100 mcg by mouth once daily.      ferrous sulfate (FEOSOL) 325 mg (65 mg iron) Tab tablet Take 1 tablet (325 mg total) by mouth once daily. 90 tablet 3    folic acid (FOLVITE) 1 MG tablet Take 1 tablet (1,000 mcg total) by mouth once daily. 90 tablet 3    losartan-hydrochlorothiazide 50-12.5 mg (HYZAAR) 50-12.5 mg per tablet Take 1 tablet by mouth 2 (two) times a day. 180 tablet 3    metFORMIN (GLUCOPHAGE-XR) 500 MG ER 24hr tablet Take 2 tablets (1,000 mg total) by mouth once daily. 180 tablet 2    mirabegron (MYRBETRIQ) 50 mg Tb24 Take 1 tablet (50 mg total) by mouth once daily. 30 tablet 11    omeprazole (PRILOSEC) 40 MG capsule Take 1 capsule (40 mg total) by mouth every morning. 90 capsule 3    rosuvastatin (CRESTOR) 10 MG tablet Take 1 tablet (10 mg total) by mouth every evening. 90 tablet 2    tamsulosin (FLOMAX) 0.4 mg Cap Take 1 capsule (0.4 mg total) by mouth once daily. 30 capsule 11    valACYclovir  "(VALTREX) 1000 MG tablet Take 1,000 mg by mouth once daily.        No current facility-administered medications for this visit.       Past Medical History:   Diagnosis Date    Diabetes mellitus, type 2     GERD (gastroesophageal reflux disease)     Gross hematuria 4/30/2019    H/O left breast biopsy     Hyperlipidemia     Hypertension     Kidney stone     Migraines     Nephrolithiasis 7/24/2018    Raynaud's disease     Renal calculus, right 11/23/2015    Renal colic on right side 11/23/2015    Right ureteral calculus 4/30/2019    Urinary tract infection     Vaginal infection        Past Surgical History:   Procedure Laterality Date    BREAST CYST EXCISION      EXTRACORPOREAL SHOCK WAVE LITHOTRIPSY Right 8/9/2018    Procedure: LITHOTRIPSY, ESWL;  Surgeon: Milton Garland MD;  Location: [facility] OR;  Service: Urology;  Laterality: Right;    EXTRACORPOREAL SHOCK WAVE LITHOTRIPSY Right 5/2/2019    Procedure: LITHOTRIPSY, ESWL;  Surgeon: Milton Garland MD;  Location: [facility] OR;  Service: Urology;  Laterality: Right;    EXTRACORPOREAL SHOCK WAVE LITHOTRIPSY Right 7/25/2019    Procedure: LITHOTRIPSY, ESWL;  Surgeon: Milton Garland MD;  Location: [facility] OR;  Service: Urology;  Laterality: Right;    HYSTERECTOMY      Partial    LITHOTRIPSY      x 3 or 4 per pt       Review of Systems:  ROS    OBJECTIVE:     PHYSICAL EXAM:  Height: 5' 5" (165.1 cm) Weight: 131.1 kg (289 lb)  Vitals:    02/07/22 1411   Weight: 131.1 kg (289 lb)   Height: 5' 5" (1.651 m)   PainSc:   2     Well developed, well nourished female in no acute distress  Alert and oriented x 3  HEENT- Normal exam  Lungs- Clear to auscultation  Heart- Regular rate and rhythm  Abdomen- Soft nontender  Extremity exam- examination of the right hand demonstrates a slight extensor lag of the right index finger about 20 to 30°.    She has normal flexion of all digits  strength intact sensation intact  There is no weakness of the other digits thumb or " wrist in extension  In the proximal forearm there is a soft tissue mass on the dorsal radial side of the proximal forearm appears to be consistent with the lipoma or possibly ganglion cyst in the forearm area  Tinel sign negative    RADIOGRAPHS:  AP lateral x-ray of the right hand demonstrate no bony abnormalities  Comments: I have personally reviewed the imaging and I agree with the above radiologist's report.    ASSESSMENT/PLAN:     IMPRESSION:  1. Extensor lag right index finger.    2. Soft tissue mass right forearm    PLAN:  The question is whether these 2 issues are related and there is the possibility that the proximal forearm mass is causing a partial radial nerve palsy only affecting the index finger.  This would be very rare to have only 1 finger affected  The other possibilities that could be an extensor tendon problem to the index finger  I think what we will do is start with some OT which she is already doing try splinting of the index finger in extension at the MP joint or possibly a camilla tape of the middle and index finger for a few weeks and see how this does  If symptoms fail to improve we may need to get an MRI scan of the right proximal forearm to better assess the soft tissue mass  Follow-up 4-6 weeks       - We talked at length about the anatomy and pathophysiology of   Encounter Diagnosis   Name Primary?    Decreased range of motion of finger of right hand            Disclaimer: This note has been generated using voice-recognition software. There may be typographical errors that have been missed during proof-reading.

## 2022-03-15 ENCOUNTER — HOSPITAL ENCOUNTER (OUTPATIENT)
Dept: RADIOLOGY | Facility: HOSPITAL | Age: 62
Discharge: HOME | End: 2022-03-15
Attending: NURSE PRACTITIONER
Payer: COMMERCIAL

## 2022-03-15 DIAGNOSIS — R91.1 PULMONARY NODULE 1 CM OR GREATER IN DIAMETER: ICD-10-CM

## 2022-03-15 PROCEDURE — 71250 CT LOW DOSE CHEST DIAGNOSTIC: ICD-10-PCS | Mod: 26,,, | Performed by: RADIOLOGY

## 2022-03-15 PROCEDURE — 71250 CT THORAX DX C-: CPT | Mod: 26,,, | Performed by: RADIOLOGY

## 2022-03-15 PROCEDURE — 71250 CT THORAX DX C-: CPT | Mod: TC

## 2022-03-17 ENCOUNTER — PATIENT OUTREACH (OUTPATIENT)
Dept: ADMINISTRATIVE | Facility: OTHER | Age: 62
End: 2022-03-17
Payer: COMMERCIAL

## 2022-03-17 NOTE — PROGRESS NOTES
Health Maintenance Due   Topic Date Due    TETANUS VACCINE  Never done    Shingles Vaccine (1 of 2) Never done    Eye Exam  03/22/2022    Colorectal Cancer Screening  04/14/2022    Mammogram  05/07/2022     Updates were requested from care everywhere.  Chart was reviewed for overdue [facility] Encounters ([facility]) topics (CRS, Breast Cancer Screening, Eye exam)  Health Maintenance has been updated.  LINKS immunization registry triggered.  Immunizations were reconciled.

## 2022-03-20 ENCOUNTER — PATIENT MESSAGE (OUTPATIENT)
Dept: UROLOGY | Facility: CLINIC | Age: 62
End: 2022-03-20
Payer: COMMERCIAL

## 2022-03-21 ENCOUNTER — OFFICE VISIT (OUTPATIENT)
Dept: ORTHOPEDICS | Facility: CLINIC | Age: 62
End: 2022-03-21
Payer: COMMERCIAL

## 2022-03-21 VITALS — HEIGHT: 65 IN | BODY MASS INDEX: 48.15 KG/M2 | WEIGHT: 289 LBS

## 2022-03-21 DIAGNOSIS — R35.0 URINARY FREQUENCY: ICD-10-CM

## 2022-03-21 DIAGNOSIS — R35.1 NOCTURIA: ICD-10-CM

## 2022-03-21 DIAGNOSIS — N39.41 URGE URINARY INCONTINENCE: ICD-10-CM

## 2022-03-21 DIAGNOSIS — M79.601 PAIN OF RIGHT UPPER EXTREMITY: Primary | ICD-10-CM

## 2022-03-21 DIAGNOSIS — R39.15 URINARY URGENCY: ICD-10-CM

## 2022-03-21 DIAGNOSIS — N32.81 OVERACTIVE BLADDER: ICD-10-CM

## 2022-03-21 PROCEDURE — 99213 PR OFFICE/OUTPT VISIT, EST, LEVL III, 20-29 MIN: ICD-10-PCS | Mod: S$GLB,,, | Performed by: ORTHOPAEDIC SURGERY

## 2022-03-21 PROCEDURE — 99999 PR PBB SHADOW E&M-EST. PATIENT-LVL III: CPT | Mod: PBBFAC,,, | Performed by: ORTHOPAEDIC SURGERY

## 2022-03-21 PROCEDURE — 99999 PR PBB SHADOW E&M-EST. PATIENT-LVL III: ICD-10-PCS | Mod: PBBFAC,,, | Performed by: ORTHOPAEDIC SURGERY

## 2022-03-21 PROCEDURE — 1159F PR MEDICATION LIST DOCUMENTED IN MEDICAL RECORD: ICD-10-PCS | Mod: CPTII,S$GLB,, | Performed by: ORTHOPAEDIC SURGERY

## 2022-03-21 PROCEDURE — 99213 OFFICE O/P EST LOW 20 MIN: CPT | Mod: S$GLB,,, | Performed by: ORTHOPAEDIC SURGERY

## 2022-03-21 PROCEDURE — 3008F PR BODY MASS INDEX (BMI) DOCUMENTED: ICD-10-PCS | Mod: CPTII,S$GLB,, | Performed by: ORTHOPAEDIC SURGERY

## 2022-03-21 PROCEDURE — 1159F MED LIST DOCD IN RCRD: CPT | Mod: CPTII,S$GLB,, | Performed by: ORTHOPAEDIC SURGERY

## 2022-03-21 PROCEDURE — 3008F BODY MASS INDEX DOCD: CPT | Mod: CPTII,S$GLB,, | Performed by: ORTHOPAEDIC SURGERY

## 2022-03-21 RX ORDER — MIRABEGRON 50 MG/1
50 TABLET, FILM COATED, EXTENDED RELEASE ORAL DAILY
Qty: 30 TABLET | Refills: 11 | Status: SHIPPED | OUTPATIENT
Start: 2022-03-21 | End: 2023-04-10 | Stop reason: SDUPTHER

## 2022-03-21 NOTE — PROGRESS NOTES
Subjective:      Patient ID: Aminta Schreiber is a 61 y.o. female.  Chief Complaint: Follow-up of the Right Hand      HPI  Aminta Schreiber is a  61 y.o. female presenting today for follow up of right hand weakness and forearm mass.  She reports that she is still having the same symptoms in her right hand despite physical therapy  She really does not feel like the therapy helped much in terms of the weakness in the right index finger  No numbness or tingling is reported.    Review of patient's allergies indicates:  No Known Allergies      Current Outpatient Medications   Medication Sig Dispense Refill    ascorbic acid, vitamin C, (VITAMIN C) 1000 MG tablet Take 1,000 mg by mouth once daily.      cholecalciferol, vitamin D3, (VITAMIN D3) 50 mcg (2,000 unit) Cap Take 1 capsule (2,000 Units total) by mouth once daily. 90 capsule 2    co-enzyme Q-10 30 mg capsule Take 30 mg by mouth once daily.      cyanocobalamin (VITAMIN B-12) 1000 MCG tablet Take 100 mcg by mouth once daily.      ferrous sulfate (FEOSOL) 325 mg (65 mg iron) Tab tablet Take 1 tablet (325 mg total) by mouth once daily. 90 tablet 3    folic acid (FOLVITE) 1 MG tablet Take 1 tablet (1,000 mcg total) by mouth once daily. 90 tablet 3    losartan-hydrochlorothiazide 50-12.5 mg (HYZAAR) 50-12.5 mg per tablet Take 1 tablet by mouth 2 (two) times a day. 180 tablet 3    metFORMIN (GLUCOPHAGE-XR) 500 MG ER 24hr tablet Take 2 tablets (1,000 mg total) by mouth once daily. 180 tablet 2    omeprazole (PRILOSEC) 40 MG capsule Take 1 capsule (40 mg total) by mouth every morning. 90 capsule 3    rosuvastatin (CRESTOR) 10 MG tablet Take 1 tablet (10 mg total) by mouth every evening. 90 tablet 2    tamsulosin (FLOMAX) 0.4 mg Cap Take 1 capsule (0.4 mg total) by mouth once daily. 30 capsule 11    valACYclovir (VALTREX) 1000 MG tablet Take 1,000 mg by mouth once daily.       mirabegron (MYRBETRIQ) 50 mg Tb24 Take 1 tablet (50 mg total) by mouth once daily. 30 tablet 11     No  "current facility-administered medications for this visit.       Past Medical History:   Diagnosis Date    Diabetes mellitus, type 2     GERD (gastroesophageal reflux disease)     Gross hematuria 4/30/2019    H/O left breast biopsy     Hyperlipidemia     Hypertension     Kidney stone     Migraines     Nephrolithiasis 7/24/2018    Raynaud's disease     Renal calculus, right 11/23/2015    Renal colic on right side 11/23/2015    Right ureteral calculus 4/30/2019    Urinary tract infection     Vaginal infection        Past Surgical History:   Procedure Laterality Date    BREAST CYST EXCISION      EXTRACORPOREAL SHOCK WAVE LITHOTRIPSY Right 8/9/2018    Procedure: LITHOTRIPSY, ESWL;  Surgeon: Milton Garland MD;  Location: [facility];  Service: Urology;  Laterality: Right;    EXTRACORPOREAL SHOCK WAVE LITHOTRIPSY Right 5/2/2019    Procedure: LITHOTRIPSY, ESWL;  Surgeon: Milton Garland MD;  [facility]: [facility];  Service: Urology;  Laterality: Right;    EXTRACORPOREAL SHOCK WAVE LITHOTRIPSY Right 7/25/2019    Procedure: LITHOTRIPSY, ESWL;  Surgeon: Milton Garland MD;  Location: [facility] OR;  Service: Urology;  Laterality: Right;    HYSTERECTOMY      Partial    LITHOTRIPSY      x 3 or 4 per pt       OBJECTIVE:   PHYSICAL EXAM:  Height: 5' 5" (165.1 cm) Weight: 131.1 kg (289 lb)  Vitals:    03/21/22 1421   Weight: 131.1 kg (289 lb)   Height: 5' 5" (1.651 m)   PainSc: 0-No pain     Ortho/SPM Exam  Examination right arm demonstrates soft tissue mass deep in the proximal forearm dorsally mildly tender  No evidence of infection  Range of motion elbow wrist full  There is weakness with finger extension particularly the right index finger  Sensation intact right hand    RADIOGRAPHS:  None  Comments: I have personally reviewed the imaging and I agree with the above radiologist's report.    ASSESSMENT/PLAN:     IMPRESSION:  1. Soft tissue mass right proximal forearm.    2. Possible partial nerve palsy affecting right " index finger     PLAN:  I have ordered an MRI scan of the right proximal forearm to check the mass and see if there is compression on the radial nerve or the deep branch of the posterior interosseous nerve  Follow-up after the MRI is complete    FOLLOW UP:  After the MRI    Disclaimer: This note has been generated using voice-recognition software. There may be typographical errors that have been missed during proof-reading.

## 2022-03-23 DIAGNOSIS — E11.9 TYPE 2 DIABETES MELLITUS WITHOUT COMPLICATION, UNSPECIFIED WHETHER LONG TERM INSULIN USE: ICD-10-CM

## 2022-03-25 ENCOUNTER — TELEPHONE (OUTPATIENT)
Dept: PULMONOLOGY | Facility: CLINIC | Age: 62
End: 2022-03-25
Payer: COMMERCIAL

## 2022-03-25 DIAGNOSIS — R91.1 PULMONARY NODULE 1 CM OR GREATER IN DIAMETER: Primary | ICD-10-CM

## 2022-03-25 NOTE — TELEPHONE ENCOUNTER
Late entry from 3/24/2022-    Spoke with patient and discussed CT of chest results.     Lung-RADS Category:  3 - Probably Benign- 6 month LDCT.  The right middle lobe, solid, peripheral nodule (11.7 x 3.6 mm) with average diameter of 7.65 mm requires additional evaluation.  Recommend six-month follow-up CT following initial detection.  Therefore next follow-up CT should be performed circa June-July 2023.      Explained to patient CT of chest should be in Sept 2022 (6 months)- not in June-July 2023 as impression reads.   Pt verbalizes understanding.

## 2022-03-28 ENCOUNTER — PATIENT MESSAGE (OUTPATIENT)
Dept: ADMINISTRATIVE | Facility: HOSPITAL | Age: 62
End: 2022-03-28
Payer: COMMERCIAL
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
## 2022-03-31 ENCOUNTER — PATIENT MESSAGE (OUTPATIENT)
Dept: ORTHOPEDICS | Facility: CLINIC | Age: 62
End: 2022-03-31
Payer: COMMERCIAL

## 2022-03-31 ENCOUNTER — OFFICE VISIT (OUTPATIENT)
Dept: FAMILY MEDICINE | Facility: CLINIC | Age: 62
End: 2022-03-31
Payer: COMMERCIAL

## 2022-03-31 ENCOUNTER — PATIENT MESSAGE (OUTPATIENT)
Dept: FAMILY MEDICINE | Facility: CLINIC | Age: 62
End: 2022-03-31

## 2022-03-31 ENCOUNTER — PATIENT MESSAGE (OUTPATIENT)
Dept: PULMONOLOGY | Facility: CLINIC | Age: 62
End: 2022-03-31
Payer: COMMERCIAL

## 2022-03-31 VITALS
DIASTOLIC BLOOD PRESSURE: 80 MMHG | HEART RATE: 76 BPM | BODY MASS INDEX: 48.82 KG/M2 | WEIGHT: 293 LBS | SYSTOLIC BLOOD PRESSURE: 130 MMHG | HEIGHT: 65 IN | OXYGEN SATURATION: 99 % | RESPIRATION RATE: 18 BRPM

## 2022-03-31 DIAGNOSIS — E66.01 CLASS 3 SEVERE OBESITY DUE TO EXCESS CALORIES WITH SERIOUS COMORBIDITY AND BODY MASS INDEX (BMI) OF 45.0 TO 49.9 IN ADULT: ICD-10-CM

## 2022-03-31 DIAGNOSIS — F43.23 ADJUSTMENT DISORDER WITH MIXED ANXIETY AND DEPRESSED MOOD: ICD-10-CM

## 2022-03-31 DIAGNOSIS — E78.5 HYPERLIPIDEMIA ASSOCIATED WITH TYPE 2 DIABETES MELLITUS: ICD-10-CM

## 2022-03-31 DIAGNOSIS — E11.59 HYPERTENSION ASSOCIATED WITH TYPE 2 DIABETES MELLITUS: ICD-10-CM

## 2022-03-31 DIAGNOSIS — R91.1 PULMONARY NODULE 1 CM OR GREATER IN DIAMETER: ICD-10-CM

## 2022-03-31 DIAGNOSIS — I15.2 HYPERTENSION ASSOCIATED WITH TYPE 2 DIABETES MELLITUS: ICD-10-CM

## 2022-03-31 DIAGNOSIS — E11.65 TYPE 2 DIABETES MELLITUS WITH HYPERGLYCEMIA, WITHOUT LONG-TERM CURRENT USE OF INSULIN: ICD-10-CM

## 2022-03-31 DIAGNOSIS — E11.65 TYPE 2 DIABETES MELLITUS WITH HYPERGLYCEMIA, WITHOUT LONG-TERM CURRENT USE OF INSULIN: Primary | ICD-10-CM

## 2022-03-31 DIAGNOSIS — E11.69 HYPERLIPIDEMIA ASSOCIATED WITH TYPE 2 DIABETES MELLITUS: ICD-10-CM

## 2022-03-31 PROBLEM — M53.3 COCCYDYNIA: Status: RESOLVED | Noted: 2021-12-29 | Resolved: 2022-03-31

## 2022-03-31 PROBLEM — R10.9 FLANK PAIN: Status: RESOLVED | Noted: 2021-06-08 | Resolved: 2022-03-31

## 2022-03-31 PROBLEM — E66.813 CLASS 3 SEVERE OBESITY DUE TO EXCESS CALORIES WITH SERIOUS COMORBIDITY AND BODY MASS INDEX (BMI) OF 45.0 TO 49.9 IN ADULT: Status: ACTIVE | Noted: 2022-01-14

## 2022-03-31 PROCEDURE — 3075F PR MOST RECENT SYSTOLIC BLOOD PRESS GE 130-139MM HG: ICD-10-PCS | Mod: CPTII,S$GLB,, | Performed by: FAMILY MEDICINE

## 2022-03-31 PROCEDURE — 3075F SYST BP GE 130 - 139MM HG: CPT | Mod: CPTII,S$GLB,, | Performed by: FAMILY MEDICINE

## 2022-03-31 PROCEDURE — 3066F PR DOCUMENTATION OF TREATMENT FOR NEPHROPATHY: ICD-10-PCS | Mod: CPTII,S$GLB,, | Performed by: FAMILY MEDICINE

## 2022-03-31 PROCEDURE — 1159F PR MEDICATION LIST DOCUMENTED IN MEDICAL RECORD: ICD-10-PCS | Mod: CPTII,S$GLB,, | Performed by: FAMILY MEDICINE

## 2022-03-31 PROCEDURE — 1159F MED LIST DOCD IN RCRD: CPT | Mod: CPTII,S$GLB,, | Performed by: FAMILY MEDICINE

## 2022-03-31 PROCEDURE — 3066F NEPHROPATHY DOC TX: CPT | Mod: CPTII,S$GLB,, | Performed by: FAMILY MEDICINE

## 2022-03-31 PROCEDURE — 3008F BODY MASS INDEX DOCD: CPT | Mod: CPTII,S$GLB,, | Performed by: FAMILY MEDICINE

## 2022-03-31 PROCEDURE — 3051F HG A1C>EQUAL 7.0%<8.0%: CPT | Mod: CPTII,S$GLB,, | Performed by: FAMILY MEDICINE

## 2022-03-31 PROCEDURE — 3051F PR MOST RECENT HEMOGLOBIN A1C LEVEL 7.0 - < 8.0%: ICD-10-PCS | Mod: CPTII,S$GLB,, | Performed by: FAMILY MEDICINE

## 2022-03-31 PROCEDURE — 3008F PR BODY MASS INDEX (BMI) DOCUMENTED: ICD-10-PCS | Mod: CPTII,S$GLB,, | Performed by: FAMILY MEDICINE

## 2022-03-31 PROCEDURE — 3079F PR MOST RECENT DIASTOLIC BLOOD PRESSURE 80-89 MM HG: ICD-10-PCS | Mod: CPTII,S$GLB,, | Performed by: FAMILY MEDICINE

## 2022-03-31 PROCEDURE — 99214 PR OFFICE/OUTPT VISIT, EST, LEVL IV, 30-39 MIN: ICD-10-PCS | Mod: S$GLB,,, | Performed by: FAMILY MEDICINE

## 2022-03-31 PROCEDURE — 3061F NEG MICROALBUMINURIA REV: CPT | Mod: CPTII,S$GLB,, | Performed by: FAMILY MEDICINE

## 2022-03-31 PROCEDURE — 3061F PR NEG MICROALBUMINURIA RESULT DOCUMENTED/REVIEW: ICD-10-PCS | Mod: CPTII,S$GLB,, | Performed by: FAMILY MEDICINE

## 2022-03-31 PROCEDURE — 99999 PR PBB SHADOW E&M-EST. PATIENT-LVL V: ICD-10-PCS | Mod: PBBFAC,,, | Performed by: FAMILY MEDICINE

## 2022-03-31 PROCEDURE — 3079F DIAST BP 80-89 MM HG: CPT | Mod: CPTII,S$GLB,, | Performed by: FAMILY MEDICINE

## 2022-03-31 PROCEDURE — 99999 PR PBB SHADOW E&M-EST. PATIENT-LVL V: CPT | Mod: PBBFAC,,, | Performed by: FAMILY MEDICINE

## 2022-03-31 PROCEDURE — 99214 OFFICE O/P EST MOD 30 MIN: CPT | Mod: S$GLB,,, | Performed by: FAMILY MEDICINE

## 2022-03-31 RX ORDER — LINAGLIPTIN 5 MG/1
5 TABLET, FILM COATED ORAL DAILY
Qty: 30 TABLET | Refills: 5 | Status: SHIPPED | OUTPATIENT
Start: 2022-03-31 | End: 2022-03-31 | Stop reason: SDUPTHER

## 2022-03-31 RX ORDER — LINAGLIPTIN 5 MG/1
5 TABLET, FILM COATED ORAL DAILY
Qty: 30 TABLET | Refills: 5 | Status: SHIPPED | OUTPATIENT
Start: 2022-03-31 | End: 2022-04-04

## 2022-03-31 NOTE — TELEPHONE ENCOUNTER
No new care gaps identified.  Powered by [employer]. Reference number: [id number].   3/31/2022 12:22:34 PM CDT

## 2022-04-01 ENCOUNTER — TELEPHONE (OUTPATIENT)
Dept: PHARMACY | Facility: CLINIC | Age: 62
End: 2022-04-01
Payer: COMMERCIAL

## 2022-04-01 DIAGNOSIS — E11.65 TYPE 2 DIABETES MELLITUS WITH HYPERGLYCEMIA, WITHOUT LONG-TERM CURRENT USE OF INSULIN: Primary | ICD-10-CM

## 2022-04-01 NOTE — TELEPHONE ENCOUNTER
Please let patient know I ordered the test.   She will have to follow with her PCP and/or schedule with a new pulmonary provider to discuss results in 6 months.

## 2022-04-04 NOTE — TELEPHONE ENCOUNTER
Please call Aminta Schreiber insurance declined Tradjenta but will cover Januvia which is a similar medication. I sent Januvia 25 mg daily to [facility] to review. [facility] or if she would prefer me to send to her pharmacy, I can  Orders Placed This Encounter    SITagliptin (JANUVIA) 25 MG Tab     Dr. Verenice Chase D.O.   Family Medicine

## 2022-04-18 ENCOUNTER — HOSPITAL ENCOUNTER (OUTPATIENT)
Dept: RADIOLOGY | Facility: HOSPITAL | Age: 62
Discharge: HOME | End: 2022-04-18
Attending: ORTHOPAEDIC SURGERY
Payer: COMMERCIAL

## 2022-04-18 DIAGNOSIS — M79.601 PAIN OF RIGHT UPPER EXTREMITY: ICD-10-CM

## 2022-04-18 PROCEDURE — 73218 MRI FOREARM WITHOUT CONTRAST RIGHT: ICD-10-PCS | Mod: 26,RT,, | Performed by: RADIOLOGY

## 2022-04-18 PROCEDURE — 73218 MRI UPPER EXTREMITY W/O DYE: CPT | Mod: 26,RT,, | Performed by: RADIOLOGY

## 2022-04-18 PROCEDURE — 73218 MRI UPPER EXTREMITY W/O DYE: CPT | Mod: TC,RT

## 2022-04-21 ENCOUNTER — PATIENT OUTREACH (OUTPATIENT)
Dept: ADMINISTRATIVE | Facility: OTHER | Age: 62
End: 2022-04-21
Payer: COMMERCIAL

## 2022-04-21 NOTE — PROGRESS NOTES
Health Maintenance Due   Topic Date Due    TETANUS VACCINE  Never done    Shingles Vaccine (1 of 2) Never done    Pneumococcal Vaccines (Age 0-64) (2 - PCV) 07/20/2021    Eye Exam  03/22/2022    Colorectal Cancer Screening  04/14/2022    Mammogram  05/07/2022     Updates were requested from care everywhere.  Chart was reviewed for overdue Proactive [facility] Encounters ([facility]) topics (CRS, Breast Cancer Screening, Eye exam)  Health Maintenance has been updated.  LIN[place] immunization registry triggered.  Immunizations were reconciled.

## 2022-04-25 ENCOUNTER — OFFICE VISIT (OUTPATIENT)
Dept: ORTHOPEDICS | Facility: CLINIC | Age: 62
End: 2022-04-25
Payer: COMMERCIAL

## 2022-04-25 VITALS — WEIGHT: 293 LBS | BODY MASS INDEX: 48.82 KG/M2 | HEIGHT: 65 IN

## 2022-04-25 DIAGNOSIS — D17.21 BENIGN LIPOMATOUS NEOPLASM OF SKIN, SUBCU OF RIGHT ARM: Primary | ICD-10-CM

## 2022-04-25 PROCEDURE — 99214 OFFICE O/P EST MOD 30 MIN: CPT | Mod: S$GLB,,, | Performed by: ORTHOPAEDIC SURGERY

## 2022-04-25 PROCEDURE — 3051F PR MOST RECENT HEMOGLOBIN A1C LEVEL 7.0 - < 8.0%: ICD-10-PCS | Mod: CPTII,S$GLB,, | Performed by: ORTHOPAEDIC SURGERY

## 2022-04-25 PROCEDURE — 3008F BODY MASS INDEX DOCD: CPT | Mod: CPTII,S$GLB,, | Performed by: ORTHOPAEDIC SURGERY

## 2022-04-25 PROCEDURE — 99999 PR PBB SHADOW E&M-EST. PATIENT-LVL III: ICD-10-PCS | Mod: PBBFAC,,, | Performed by: ORTHOPAEDIC SURGERY

## 2022-04-25 PROCEDURE — 3066F NEPHROPATHY DOC TX: CPT | Mod: CPTII,S$GLB,, | Performed by: ORTHOPAEDIC SURGERY

## 2022-04-25 PROCEDURE — 3066F PR DOCUMENTATION OF TREATMENT FOR NEPHROPATHY: ICD-10-PCS | Mod: CPTII,S$GLB,, | Performed by: ORTHOPAEDIC SURGERY

## 2022-04-25 PROCEDURE — 1159F MED LIST DOCD IN RCRD: CPT | Mod: CPTII,S$GLB,, | Performed by: ORTHOPAEDIC SURGERY

## 2022-04-25 PROCEDURE — 99999 PR PBB SHADOW E&M-EST. PATIENT-LVL III: CPT | Mod: PBBFAC,,, | Performed by: ORTHOPAEDIC SURGERY

## 2022-04-25 PROCEDURE — 1159F PR MEDICATION LIST DOCUMENTED IN MEDICAL RECORD: ICD-10-PCS | Mod: CPTII,S$GLB,, | Performed by: ORTHOPAEDIC SURGERY

## 2022-04-25 PROCEDURE — 3061F NEG MICROALBUMINURIA REV: CPT | Mod: CPTII,S$GLB,, | Performed by: ORTHOPAEDIC SURGERY

## 2022-04-25 PROCEDURE — 3061F PR NEG MICROALBUMINURIA RESULT DOCUMENTED/REVIEW: ICD-10-PCS | Mod: CPTII,S$GLB,, | Performed by: ORTHOPAEDIC SURGERY

## 2022-04-25 PROCEDURE — 99214 PR OFFICE/OUTPT VISIT, EST, LEVL IV, 30-39 MIN: ICD-10-PCS | Mod: S$GLB,,, | Performed by: ORTHOPAEDIC SURGERY

## 2022-04-25 PROCEDURE — 3008F PR BODY MASS INDEX (BMI) DOCUMENTED: ICD-10-PCS | Mod: CPTII,S$GLB,, | Performed by: ORTHOPAEDIC SURGERY

## 2022-04-25 PROCEDURE — 3051F HG A1C>EQUAL 7.0%<8.0%: CPT | Mod: CPTII,S$GLB,, | Performed by: ORTHOPAEDIC SURGERY

## 2022-04-25 RX ORDER — CEFAZOLIN SODIUM 2 G/50ML
2 SOLUTION INTRAVENOUS
Status: CANCELLED | OUTPATIENT
Start: 2022-04-25

## 2022-04-25 NOTE — PROGRESS NOTES
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 female with ongoing symptoms right hand and forearm related to a soft tissue mass  Now having some weakness with finger extension thumb and index finger of the right hand  Recent MRI confirms a large lipoma causing compression on the posterior interosseous nerve         PAST MEDICAL HISTORY:   Past Medical History:   Diagnosis Date    Diabetes mellitus, type 2     GERD (gastroesophageal reflux disease)     Gross hematuria 04/30/2019    H/O left breast biopsy     Hyperlipidemia     Hypertension     Kidney stone     Migraines     Nephrolithiasis 07/24/2018    Personal history of colonic polyps 01/15/2014    Raynaud's disease     Renal calculus, right 11/23/2015    Renal colic on right side 11/23/2015    Right ureteral calculus 04/30/2019    Urinary tract infection     Vaginal infection      PAST SURGICAL HISTORY:   Past Surgical History:   Procedure Laterality Date    BREAST CYST EXCISION      EXTRACORPOREAL SHOCK WAVE LITHOTRIPSY Right 8/9/2018    Procedure: LITHOTRIPSY, ESWL;  Surgeon: Milton Garland MD;  Location: [facility] OR;  Service: Urology;  Laterality: Right;    EXTRACORPOREAL SHOCK WAVE LITHOTRIPSY Right 5/2/2019    Procedure: LITHOTRIPSY, ESWL;  Surgeon: Milton Garland MD;  Location: [facility] OR;  Service: Urology;  Laterality: Right;    EXTRACORPOREAL SHOCK WAVE LITHOTRIPSY Right 7/25/2019    Procedure: LITHOTRIPSY, ESWL;  Surgeon: Milton Garland MD;  Location: [facility] OR;  Service: Urology;  Laterality: Right;    HYSTERECTOMY      Partial    LITHOTRIPSY      x 3 or 4 per pt     FAMILY HISTORY:   Family History   Problem Relation Age of Onset    Diabetes Mother     Hypertension Mother     Cancer Father     No Known Problems Brother     Migraines Daughter     No Known Problems Son     Hypertension Son     Kidney disease Neg Hx      SOCIAL HISTORY:   Social History  "    Socioeconomic History    Marital status:     Number of children: 3   Tobacco Use    Smoking status: Current Every Day Smoker     Packs/day: 0.50     Types: Cigarettes    Smokeless tobacco: Never Used   Substance and Sexual Activity    Alcohol use: Yes     Alcohol/week: 1.0 standard drink     Types: 1 Standard drinks or equivalent per week     Comment: 'on occasion"    Drug use: No    Sexual activity: Yes     Partners: Male     Birth control/protection: Surgical   Social History Narrative    .  Three children.  Works at [employer]       MEDICATIONS:   Current Outpatient Medications:     ascorbic acid, vitamin C, (VITAMIN C) 1000 MG tablet, Take 1,000 mg by mouth once daily., Disp: , Rfl:     cholecalciferol, vitamin D3, (VITAMIN D3) 50 mcg (2,000 unit) Cap, Take 1 capsule (2,000 Units total) by mouth once daily., Disp: 90 capsule, Rfl: 2    co-enzyme Q-10 30 mg capsule, Take 30 mg by mouth once daily., Disp: , Rfl:     cyanocobalamin (VITAMIN B-12) 1000 MCG tablet, Take 100 mcg by mouth once daily., Disp: , Rfl:     ferrous sulfate (FEOSOL) 325 mg (65 mg iron) Tab tablet, Take 1 tablet (325 mg total) by mouth once daily., Disp: 90 tablet, Rfl: 3    folic acid (FOLVITE) 1 MG tablet, Take 1 tablet (1,000 mcg total) by mouth once daily., Disp: 90 tablet, Rfl: 3    losartan-hydrochlorothiazide 50-12.5 mg (HYZAAR) 50-12.5 mg per tablet, Take 1 tablet by mouth 2 (two) times a day., Disp: 180 tablet, Rfl: 3    metFORMIN (GLUCOPHAGE-XR) 500 MG ER 24hr tablet, Take 2 tablets (1,000 mg total) by mouth once daily., Disp: 180 tablet, Rfl: 2    mirabegron (MYRBETRIQ) 50 mg Tb24, Take 1 tablet (50 mg total) by mouth once daily., Disp: 30 tablet, Rfl: 11    omeprazole (PRILOSEC) 40 MG capsule, Take 1 capsule (40 mg total) by mouth every morning., Disp: 90 capsule, Rfl: 3    rosuvastatin (CRESTOR) 10 MG tablet, Take 1 tablet (10 mg total) by mouth every evening., Disp: 90 tablet, Rfl: 2    " "SITagliptin (JANUVIA) 25 MG Tab, Take 1 tablet (25 mg total) by mouth once daily., Disp: 90 tablet, Rfl: 3    tamsulosin (FLOMAX) 0.4 mg Cap, Take 1 capsule (0.4 mg total) by mouth once daily., Disp: 30 capsule, Rfl: 11    valACYclovir (VALTREX) 1000 MG tablet, Take 1,000 mg by mouth once daily. , Disp: , Rfl:   ALLERGIES: Review of patient's allergies indicates:  No Known Allergies    Review of Systems:  Constitutional: no fever or chills  ENT: no nasal congestion or sore throat  Respiratory: no cough or shortness of breath  Cardiovascular: no chest pain or palpitations  Gastrointestinal: no nausea or vomiting, PUD, GERD, NSAID intolerance  Genitourinary: no hematuria or dysuria  Integument/Breast: no rash or pruritis  Hematologic/Lymphatic: no easy bruising or lymphadenopathy  Musculoskeletal: see HPI  Neurological: no seizures or tremors  Behavioral/Psych: no auditory or visual hallucinations      Physical Exam   Vitals:    04/25/22 1059   Weight: 133.4 kg (294 lb)   Height: 5' 5" (1.651 m)   PainSc: 0-No pain       Constitutional: Oriented to person, place, and time. Appears well-developed and well-nourished.   HENT:   Head: Normocephalic and atraumatic.   Nose: Nose normal.   Eyes: No scleral icterus.   Neck: Normal range of motion.   Cardiovascular: Normal rate and regular rhythm.    Pulses:       Radial pulses are 2+ on the right side, and 2+ on the left side.   Pulmonary/Chest: Effort normal and breath sounds normal.   Abdominal: Soft.   Neurological: Alert and oriented to person, place, and time.   Skin: Skin is warm.   Psychiatric: Normal mood and affect.     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  The consent was signed in the office for surgery.      Odell Escamilla MD (Jay)  [facility]  Orthopedic Upper Extremity Surgery      "

## 2022-05-11 ENCOUNTER — PATIENT MESSAGE (OUTPATIENT)
Dept: FAMILY MEDICINE | Facility: CLINIC | Age: 62
End: 2022-05-11
Payer: COMMERCIAL

## 2022-05-11 DIAGNOSIS — B35.1 ONYCHOMYCOSIS: Primary | ICD-10-CM

## 2022-05-12 ENCOUNTER — PATIENT MESSAGE (OUTPATIENT)
Dept: FAMILY MEDICINE | Facility: CLINIC | Age: 62
End: 2022-05-12
Payer: COMMERCIAL

## 2022-05-12 ENCOUNTER — PATIENT OUTREACH (OUTPATIENT)
Dept: ADMINISTRATIVE | Facility: HOSPITAL | Age: 62
End: 2022-05-12
Payer: COMMERCIAL

## 2022-05-12 RX ORDER — CICLOPIROX 80 MG/ML
SOLUTION TOPICAL NIGHTLY
Qty: 6.6 ML | Refills: 11 | Status: SHIPPED | OUTPATIENT
Start: 2022-05-12 | End: 2024-03-26

## 2022-05-12 NOTE — LETTER
AUTHORIZATION FOR RELEASE OF   CONFIDENTIAL INFORMATION    Dr Hernandez,     We are seeing Aminta Schreiber, date of birth 1960, in the clinic at [facility]. Verenice Chase DO is the patient's PCP. Aminta Schreiber has an outstanding lab/procedure at the time we reviewed her chart. In order to help keep her health information updated, she has authorized us to request the following medical record(s):                         ( X )  COLONOSCOPY             Please fax records to [facility], Jenny M Kuo, DO, [phone]    If you have any questions, please contact Sheila Crooks at [phone].             Patient Name: Aminta Schreiber  : 1960  Patient Phone #: [phone]

## 2022-05-12 NOTE — LETTER
AUTHORIZATION FOR RELEASE OF   CONFIDENTIAL INFORMATION    Dr Rivera,    We are seeing Aminta Schreiber, date of birth 1960, in the clinic at [facility]. Verenice Chase DO is the patient's PCP. Aminta Schreiber has an outstanding lab/procedure at the time we reviewed her chart. In order to help keep her health information updated, she has authorized us to request the following medical record(s):        ( X )  MAMMOGRAM                                       Please fax records to [facility], Jenny M Kuo, DO, [phone]    If you have any questions, please contact Sheila Crooks at [phone].           Patient Name: Aminta Schreiber  : 1960  Patient Phone #: [phone]

## 2022-05-12 NOTE — LETTER
AUTHORIZATION FOR RELEASE OF   CONFIDENTIAL INFORMATION    Dr. Hernandez,    We are seeing Aminta Schreiber, date of birth 1960, in the clinic at [facility]. Verenice Chase DO is the patient's PCP. Aminta Schreiber has an outstanding lab/procedure at the time we reviewed her chart. In order to help keep her health information updated, she has authorized us to request the following medical record(s):                         ( X )  COLONOSCOPY             Please fax records to [facility], Jenny M Kuo, DO, [phone]    If you have any questions, please contact Sheila Crooks at [phone].         Patient Name: Aminta Schreiber  : 1960  Patient Phone #: [phone]

## 2022-05-23 ENCOUNTER — OFFICE VISIT (OUTPATIENT)
Dept: PODIATRY | Facility: CLINIC | Age: 62
End: 2022-05-23
Payer: COMMERCIAL

## 2022-05-23 VITALS
HEIGHT: 65 IN | SYSTOLIC BLOOD PRESSURE: 120 MMHG | DIASTOLIC BLOOD PRESSURE: 70 MMHG | HEART RATE: 75 BPM | WEIGHT: 293 LBS | BODY MASS INDEX: 48.82 KG/M2

## 2022-05-23 DIAGNOSIS — B35.1 TINEA UNGUIUM: ICD-10-CM

## 2022-05-23 DIAGNOSIS — B35.1 ONYCHOMYCOSIS: Primary | ICD-10-CM

## 2022-05-23 DIAGNOSIS — E66.01 MORBID OBESITY: ICD-10-CM

## 2022-05-23 DIAGNOSIS — E11.65 TYPE 2 DIABETES MELLITUS WITH HYPERGLYCEMIA, WITHOUT LONG-TERM CURRENT USE OF INSULIN: ICD-10-CM

## 2022-05-23 DIAGNOSIS — L85.3 XEROSIS OF SKIN: ICD-10-CM

## 2022-05-23 PROCEDURE — 3066F NEPHROPATHY DOC TX: CPT | Mod: CPTII,S$GLB,, | Performed by: PODIATRIST

## 2022-05-23 PROCEDURE — 3078F DIAST BP <80 MM HG: CPT | Mod: CPTII,S$GLB,, | Performed by: PODIATRIST

## 2022-05-23 PROCEDURE — 1159F PR MEDICATION LIST DOCUMENTED IN MEDICAL RECORD: ICD-10-PCS | Mod: CPTII,S$GLB,, | Performed by: PODIATRIST

## 2022-05-23 PROCEDURE — 3051F PR MOST RECENT HEMOGLOBIN A1C LEVEL 7.0 - < 8.0%: ICD-10-PCS | Mod: CPTII,S$GLB,, | Performed by: PODIATRIST

## 2022-05-23 PROCEDURE — 99999 PR PBB SHADOW E&M-EST. PATIENT-LVL V: ICD-10-PCS | Mod: PBBFAC,,, | Performed by: PODIATRIST

## 2022-05-23 PROCEDURE — 1160F PR REVIEW ALL MEDS BY PRESCRIBER/CLIN PHARMACIST DOCUMENTED: ICD-10-PCS | Mod: CPTII,S$GLB,, | Performed by: PODIATRIST

## 2022-05-23 PROCEDURE — 99203 PR OFFICE/OUTPT VISIT, NEW, LEVL III, 30-44 MIN: ICD-10-PCS | Mod: S$GLB,,, | Performed by: PODIATRIST

## 2022-05-23 PROCEDURE — 1159F MED LIST DOCD IN RCRD: CPT | Mod: CPTII,S$GLB,, | Performed by: PODIATRIST

## 2022-05-23 PROCEDURE — 3074F SYST BP LT 130 MM HG: CPT | Mod: CPTII,S$GLB,, | Performed by: PODIATRIST

## 2022-05-23 PROCEDURE — 3051F HG A1C>EQUAL 7.0%<8.0%: CPT | Mod: CPTII,S$GLB,, | Performed by: PODIATRIST

## 2022-05-23 PROCEDURE — 3074F PR MOST RECENT SYSTOLIC BLOOD PRESSURE < 130 MM HG: ICD-10-PCS | Mod: CPTII,S$GLB,, | Performed by: PODIATRIST

## 2022-05-23 PROCEDURE — 3008F BODY MASS INDEX DOCD: CPT | Mod: CPTII,S$GLB,, | Performed by: PODIATRIST

## 2022-05-23 PROCEDURE — 1160F RVW MEDS BY RX/DR IN RCRD: CPT | Mod: CPTII,S$GLB,, | Performed by: PODIATRIST

## 2022-05-23 PROCEDURE — 99999 PR PBB SHADOW E&M-EST. PATIENT-LVL V: CPT | Mod: PBBFAC,,, | Performed by: PODIATRIST

## 2022-05-23 PROCEDURE — 3066F PR DOCUMENTATION OF TREATMENT FOR NEPHROPATHY: ICD-10-PCS | Mod: CPTII,S$GLB,, | Performed by: PODIATRIST

## 2022-05-23 PROCEDURE — 3078F PR MOST RECENT DIASTOLIC BLOOD PRESSURE < 80 MM HG: ICD-10-PCS | Mod: CPTII,S$GLB,, | Performed by: PODIATRIST

## 2022-05-23 PROCEDURE — 3061F PR NEG MICROALBUMINURIA RESULT DOCUMENTED/REVIEW: ICD-10-PCS | Mod: CPTII,S$GLB,, | Performed by: PODIATRIST

## 2022-05-23 PROCEDURE — 99203 OFFICE O/P NEW LOW 30 MIN: CPT | Mod: S$GLB,,, | Performed by: PODIATRIST

## 2022-05-23 PROCEDURE — 3008F PR BODY MASS INDEX (BMI) DOCUMENTED: ICD-10-PCS | Mod: CPTII,S$GLB,, | Performed by: PODIATRIST

## 2022-05-23 PROCEDURE — 3061F NEG MICROALBUMINURIA REV: CPT | Mod: CPTII,S$GLB,, | Performed by: PODIATRIST

## 2022-05-23 RX ORDER — AMMONIUM LACTATE 12 G/100G
1 CREAM TOPICAL 2 TIMES DAILY
Qty: 140 G | Refills: 2 | Status: SHIPPED | OUTPATIENT
Start: 2022-05-23 | End: 2024-03-26

## 2022-05-23 NOTE — PROGRESS NOTES
Subjective:      Patient ID: Aminta Schreiber is a 61 y.o. female.    Chief Complaint: Nail Problem (Patient presents today as a new diabetic patient complaining of toenail fungus)      Aminta is a 61 y.o. female who presents to the clinic upon referral from Dr. Chase  for evaluation and treatment of diabetic feet. Aminta has a past medical history of Diabetes mellitus, type 2, GERD (gastroesophageal reflux disease), Gross hematuria (04/30/2019), H/O left breast biopsy, Hyperlipidemia, Hypertension, Kidney stone, Migraines, Nephrolithiasis (07/24/2018), Personal history of colonic polyps (01/15/2014), Raynaud's disease, Renal calculus, right (11/23/2015), Renal colic on right side (11/23/2015), Right ureteral calculus (04/30/2019), Urinary tract infection, and Vaginal infection. Patient relates no major problem with feet. Only complaints today consist of  Discolored,  Mycotic toenails x10. Was  Prescribed Penlac by Dr. Chase but  Has not been actively using it.  Patient smokes half pack a day.  Patient is presenting with her .    PCP: Verenice Chase, DO    Date Last Seen by PCP: in epic     Hemoglobin A1C   Date Value Ref Range Status   03/25/2022 7.3 (H) 4.0 - 5.6 % Final     Comment:     ADA Screening Guidelines:  5.7-6.4%  Consistent with prediabetes  >or=6.5%  Consistent with diabetes    High levels of fetal hemoglobin interfere with the HbA1C  assay. Heterozygous hemoglobin variants (HbS, HgC, etc)do  not significantly interfere with this assay.   However, presence of multiple variants may affect accuracy.     12/14/2021 6.8 (H) 4.0 - 5.6 % Final     Comment:     ADA Screening Guidelines:  5.7-6.4%  Consistent with prediabetes  >or=6.5%  Consistent with diabetes    High levels of fetal hemoglobin interfere with the HbA1C  assay. Heterozygous hemoglobin variants (HbS, HgC, etc)do  not significantly interfere with this assay.   However, presence of multiple variants may affect accuracy.     09/22/2021 6.5 (H) 4.0 - 5.6 %  Final     Comment:     ADA Screening Guidelines:  5.7-6.4%  Consistent with prediabetes  >or=6.5%  Consistent with diabetes    High levels of fetal hemoglobin interfere with the HbA1C  assay. Heterozygous hemoglobin variants (HbS, HgC, etc)do  not significantly interfere with this assay.   However, presence of multiple variants may affect accuracy.         Review of Systems   Constitutional: Negative for chills, decreased appetite, fever and malaise/fatigue.   HENT: Negative for congestion, ear discharge and sore throat.    Eyes: Negative for discharge and pain.   Cardiovascular: Positive for leg swelling. Negative for chest pain and claudication.   Respiratory: Negative for cough and shortness of breath.    Skin: Positive for color change. Negative for nail changes and rash.   Musculoskeletal: Positive for arthritis and stiffness. Negative for joint pain, joint swelling and muscle weakness.   Gastrointestinal: Negative for bloating, abdominal pain, diarrhea, nausea and vomiting.   Genitourinary: Negative for flank pain and hematuria.   Neurological: Negative for headaches, numbness and weakness.   Psychiatric/Behavioral: Negative for altered mental status.             Past Medical History:   Diagnosis Date    Diabetes mellitus, type 2     GERD (gastroesophageal reflux disease)     Gross hematuria 04/30/2019    H/O left breast biopsy     Hyperlipidemia     Hypertension     Kidney stone     Migraines     Nephrolithiasis 07/24/2018    Personal history of colonic polyps 01/15/2014    Raynaud's disease     Renal calculus, right 11/23/2015    Renal colic on right side 11/23/2015    Right ureteral calculus 04/30/2019    Urinary tract infection     Vaginal infection        Past Surgical History:   Procedure Laterality Date    BREAST CYST EXCISION      EXTRACORPOREAL SHOCK WAVE LITHOTRIPSY Right 8/9/2018    Procedure: LITHOTRIPSY, ESWL;  Surgeon: Milton Garland MD;  Location: [facility] OR;  Service: Urology;   "Laterality: Right;    EXTRACORPOREAL SHOCK WAVE LITHOTRIPSY Right 5/2/2019    Procedure: LITHOTRIPSY, ESWL;  Surgeon: Milton Garland MD;  Location: [facility] OR;  Service: Urology;  Laterality: Right;    EXTRACORPOREAL SHOCK WAVE LITHOTRIPSY Right 7/25/2019    Procedure: LITHOTRIPSY, ESWL;  Surgeon: Milton Garland MD;  Location: [facility] OR;  Service: Urology;  Laterality: Right;    HYSTERECTOMY      Partial    LITHOTRIPSY      x 3 or 4 per pt       Family History   Problem Relation Age of Onset    Diabetes Mother     Hypertension Mother     Cancer Father     No Known Problems Brother     Migraines Daughter     No Known Problems Son     Hypertension Son     Kidney disease Neg Hx        Social History     Socioeconomic History    Marital status:     Number of children: 3   Tobacco Use    Smoking status: Current Every Day Smoker     Packs/day: 0.50     Types: Cigarettes    Smokeless tobacco: Never Used   Substance and Sexual Activity    Alcohol use: Yes     Alcohol/week: 1.0 standard drink     Types: 1 Standard drinks or equivalent per week     Comment: 'on occasion"    Drug use: No    Sexual activity: Yes     Partners: Male     Birth control/protection: Surgical   Social History Narrative    .  Three children.  Works at [employer]       Current Outpatient Medications   Medication Sig Dispense Refill    ascorbic acid, vitamin C, (VITAMIN C) 1000 MG tablet Take 1,000 mg by mouth once daily.      cholecalciferol, vitamin D3, (VITAMIN D3) 50 mcg (2,000 unit) Cap Take 1 capsule (2,000 Units total) by mouth once daily. 90 capsule 2    ciclopirox (PENLAC) 8 % Soln Apply topically nightly. 6.6 mL 11    co-enzyme Q-10 30 mg capsule Take 30 mg by mouth once daily.      cyanocobalamin (VITAMIN B-12) 1000 MCG tablet Take 100 mcg by mouth once daily.      ferrous sulfate (FEOSOL) 325 mg (65 mg iron) Tab tablet Take 1 tablet (325 mg total) by mouth once daily. 90 tablet 3    folic acid (FOLVITE) 1 " "MG tablet Take 1 tablet (1,000 mcg total) by mouth once daily. 90 tablet 3    losartan-hydrochlorothiazide 50-12.5 mg (HYZAAR) 50-12.5 mg per tablet Take 1 tablet by mouth 2 (two) times a day. 180 tablet 3    metFORMIN (GLUCOPHAGE-XR) 500 MG ER 24hr tablet Take 2 tablets (1,000 mg total) by mouth once daily. 180 tablet 2    mirabegron (MYRBETRIQ) 50 mg Tb24 Take 1 tablet (50 mg total) by mouth once daily. 30 tablet 11    omeprazole (PRILOSEC) 40 MG capsule Take 1 capsule (40 mg total) by mouth every morning. 90 capsule 3    rosuvastatin (CRESTOR) 10 MG tablet Take 1 tablet (10 mg total) by mouth every evening. 90 tablet 2    SITagliptin (JANUVIA) 25 MG Tab Take 1 tablet (25 mg total) by mouth once daily. 90 tablet 3    tamsulosin (FLOMAX) 0.4 mg Cap Take 1 capsule (0.4 mg total) by mouth once daily. 30 capsule 11    valACYclovir (VALTREX) 1000 MG tablet Take 1,000 mg by mouth once daily.       ammonium lactate 12 % Crea Apply 1 application topically 2 (two) times daily. 140 g 2     No current facility-administered medications for this visit.       Review of patient's allergies indicates:  No Known Allergies    Vitals:    05/23/22 0834   BP: 120/70   Pulse: 75   Weight: 133.4 kg (294 lb 1.6 oz)   Height: 5' 5" (1.651 m)   PainSc: 0-No pain       Objective:      Physical Exam  Constitutional:       General: She is not in acute distress.     Appearance: She is well-developed.   HENT:      Nose: Nose normal.   Eyes:      Conjunctiva/sclera: Conjunctivae normal.   Pulmonary:      Effort: Pulmonary effort is normal.   Chest:      Chest wall: No tenderness.   Abdominal:      Tenderness: There is no abdominal tenderness.   Musculoskeletal:      Cervical back: Normal range of motion.   Neurological:      Mental Status: She is alert and oriented to person, place, and time.   Psychiatric:         Behavior: Behavior normal.         Vascular: Distal DP/PT pulses palpable 0/4. CRT < 3 sec to tips of toes. No vericosities " noted to LEs. Hair growth absent LE, warm to touch LE, + edema noted to LE.    Dermatologic: No open lesions, lacerations or wounds. Interdigital spaces clean, dry and intact. No erythema, rubor, calor noted LE  Toenails 1-5 bilaterally are thickened by 2-3 mm, discolored/yellowed, dystrophic, brittle with subungual debris. No incurvation. + xerosis noted with some skin pigmentation changes.     Musculoskeletal: MMT 5/5 in DF/PF/Inv/Ev resistance with no reproduction of pain in any direction. Passive range of motion of ankle and pedal joints is painless. No calf tenderness LE, Compartments soft/compressible.     Neurological: Light touch, proprioception, and sharp/dull sensation are all intact. Protective threshold with the Sellers-Wienstein monofilament is intact. Vibratory sensation intact.         Assessment:       Encounter Diagnoses   Name Primary?    Onychomycosis Yes    Tinea unguium     Morbid obesity     Type 2 diabetes mellitus with hyperglycemia, without long-term current use of insulin     Xerosis of skin          Plan:       Aminta was seen today for nail problem.    Diagnoses and all orders for this visit:    Onychomycosis  -     Ambulatory referral/consult to Podiatry    Tinea unguium    Morbid obesity    Type 2 diabetes mellitus with hyperglycemia, without long-term current use of insulin    Xerosis of skin    Other orders  -     ammonium lactate 12 % Crea; Apply 1 application topically 2 (two) times daily.      I counseled the patient on her conditions, their implications and medical management.    61 y.o. female with onychomycosis, dry skin, type 2 DM.    -rx. amlactin  -pt recently was put on penlac but has not actively using it. Recommend using it as directed. Discussed different treatment options for fungal toenail including oral, topical vs laser treatments. Compression stocking as needed.     -Advised for optimal glucose control and maintenance per primary care physician. Patient was also  educated on healthy diet that is naturally rich in nutrients and low in fat and calories.   -Discussed reducing caloric intake, increase physical activity, weight loss, exercise, low salt diet, which may include blood sugar control to help with foot and ankle complications.  -The nature of the condition, options for management, as well as potential risks and complications were discussed in detail with patient. Patient was amenable to my recommendations and left my office fully informed and will follow up as instructed or sooner if necessary.    -Patient was advised of signs and symptoms of infection including redness, drainage, purulence, odor, streaking, fever, chills and I advised patient to seek medical attention (ER or urgent care) if these symptoms arise.   -f/u1 year     Note dictated with voice recognition software, please excuse any grammatical errors.

## 2022-05-31 ENCOUNTER — PATIENT MESSAGE (OUTPATIENT)
Dept: ADMINISTRATIVE | Facility: HOSPITAL | Age: 62
End: 2022-05-31
Payer: COMMERCIAL

## 2022-06-07 ENCOUNTER — ANESTHESIA (OUTPATIENT)
Dept: SURGERY | Facility: HOSPITAL | Age: 62
End: 2022-06-07
Payer: COMMERCIAL

## 2022-06-07 ENCOUNTER — ANESTHESIA EVENT (OUTPATIENT)
Dept: SURGERY | Facility: HOSPITAL | Age: 62
End: 2022-06-07
Payer: COMMERCIAL

## 2022-06-07 ENCOUNTER — HOSPITAL ENCOUNTER (OUTPATIENT)
Facility: HOSPITAL | Age: 62
Discharge: HOME | End: 2022-06-07
Attending: ORTHOPAEDIC SURGERY | Admitting: ORTHOPAEDIC SURGERY
Payer: COMMERCIAL

## 2022-06-07 VITALS
BODY MASS INDEX: 48.82 KG/M2 | OXYGEN SATURATION: 96 % | HEART RATE: 84 BPM | DIASTOLIC BLOOD PRESSURE: 50 MMHG | TEMPERATURE: 98 F | RESPIRATION RATE: 20 BRPM | HEIGHT: 65 IN | WEIGHT: 293 LBS | SYSTOLIC BLOOD PRESSURE: 101 MMHG

## 2022-06-07 DIAGNOSIS — Z01.818 PREOP TESTING: ICD-10-CM

## 2022-06-07 DIAGNOSIS — D17.21 BENIGN LIPOMATOUS NEOPLASM OF SKIN, SUBCU OF RIGHT ARM: ICD-10-CM

## 2022-06-07 LAB
ANION GAP SERPL CALC-SCNC: 12 MMOL/L (ref 8–16)
BUN SERPL-MCNC: 12 MG/DL (ref 8–23)
CALCIUM SERPL-MCNC: 9.4 MG/DL (ref 8.7–10.5)
CHLORIDE SERPL-SCNC: 102 MMOL/L (ref 95–110)
CO2 SERPL-SCNC: 30 MMOL/L (ref 23–29)
CREAT SERPL-MCNC: 0.8 MG/DL (ref 0.5–1.4)
EST. GFR  (AFRICAN AMERICAN): >60 ML/MIN/1.73 M^2
EST. GFR  (NON AFRICAN AMERICAN): >60 ML/MIN/1.73 M^2
GLUCOSE SERPL-MCNC: 135 MG/DL (ref 70–110)
POCT GLUCOSE: 147 MG/DL (ref 70–110)
POTASSIUM SERPL-SCNC: 3.3 MMOL/L (ref 3.5–5.1)
SODIUM SERPL-SCNC: 144 MMOL/L (ref 136–145)

## 2022-06-07 PROCEDURE — 71000033 HC RECOVERY, INTIAL HOUR: Performed by: ORTHOPAEDIC SURGERY

## 2022-06-07 PROCEDURE — 99900035 HC TECH TIME PER 15 MIN (STAT)

## 2022-06-07 PROCEDURE — 63600175 PHARM REV CODE 636 W HCPCS: Performed by: NURSE ANESTHETIST, CERTIFIED REGISTERED

## 2022-06-07 PROCEDURE — 25000003 PHARM REV CODE 250: Performed by: NURSE ANESTHETIST, CERTIFIED REGISTERED

## 2022-06-07 PROCEDURE — 25073 EXC FOREARM TUM DEEP 3 CM/>: CPT | Mod: RT,,, | Performed by: ORTHOPAEDIC SURGERY

## 2022-06-07 PROCEDURE — 93010 EKG 12-LEAD: ICD-10-PCS | Mod: ,,, | Performed by: INTERNAL MEDICINE

## 2022-06-07 PROCEDURE — 36415 COLL VENOUS BLD VENIPUNCTURE: CPT | Performed by: ORTHOPAEDIC SURGERY

## 2022-06-07 PROCEDURE — 71000015 HC POSTOP RECOV 1ST HR: Performed by: ORTHOPAEDIC SURGERY

## 2022-06-07 PROCEDURE — 71000039 HC RECOVERY, EACH ADD'L HOUR: Performed by: ORTHOPAEDIC SURGERY

## 2022-06-07 PROCEDURE — 88304 TISSUE EXAM BY PATHOLOGIST: CPT | Mod: 26,,, | Performed by: STUDENT IN AN ORGANIZED HEALTH CARE EDUCATION/TRAINING PROGRAM

## 2022-06-07 PROCEDURE — 36000706: Performed by: ORTHOPAEDIC SURGERY

## 2022-06-07 PROCEDURE — 93010 ELECTROCARDIOGRAM REPORT: CPT | Mod: ,,, | Performed by: INTERNAL MEDICINE

## 2022-06-07 PROCEDURE — 63600175 PHARM REV CODE 636 W HCPCS: Performed by: ANESTHESIOLOGY

## 2022-06-07 PROCEDURE — 25073 PR EXC TUMOR SFT TISS FOREARM&//WRIST SUBFASC 3+CM: ICD-10-PCS | Mod: RT,,, | Performed by: ORTHOPAEDIC SURGERY

## 2022-06-07 PROCEDURE — 80048 BASIC METABOLIC PNL TOTAL CA: CPT | Performed by: ORTHOPAEDIC SURGERY

## 2022-06-07 PROCEDURE — 93005 ELECTROCARDIOGRAM TRACING: CPT

## 2022-06-07 PROCEDURE — 37000008 HC ANESTHESIA 1ST 15 MINUTES: Performed by: ORTHOPAEDIC SURGERY

## 2022-06-07 PROCEDURE — 63600175 PHARM REV CODE 636 W HCPCS: Performed by: ORTHOPAEDIC SURGERY

## 2022-06-07 PROCEDURE — 36000707: Performed by: ORTHOPAEDIC SURGERY

## 2022-06-07 PROCEDURE — 88304 PR  SURG PATH,LEVEL III: ICD-10-PCS | Mod: 26,,, | Performed by: STUDENT IN AN ORGANIZED HEALTH CARE EDUCATION/TRAINING PROGRAM

## 2022-06-07 PROCEDURE — 88304 TISSUE EXAM BY PATHOLOGIST: CPT | Performed by: STUDENT IN AN ORGANIZED HEALTH CARE EDUCATION/TRAINING PROGRAM

## 2022-06-07 PROCEDURE — 25000003 PHARM REV CODE 250: Performed by: ORTHOPAEDIC SURGERY

## 2022-06-07 PROCEDURE — 71000016 HC POSTOP RECOV ADDL HR: Performed by: ORTHOPAEDIC SURGERY

## 2022-06-07 PROCEDURE — 37000009 HC ANESTHESIA EA ADD 15 MINS: Performed by: ORTHOPAEDIC SURGERY

## 2022-06-07 RX ORDER — ACETAMINOPHEN 325 MG/1
650 TABLET ORAL EVERY 4 HOURS PRN
Status: DISCONTINUED | OUTPATIENT
Start: 2022-06-07 | End: 2022-06-07 | Stop reason: HOSPADM

## 2022-06-07 RX ORDER — ONDANSETRON 2 MG/ML
4 INJECTION INTRAMUSCULAR; INTRAVENOUS DAILY PRN
Status: DISCONTINUED | OUTPATIENT
Start: 2022-06-07 | End: 2022-06-07 | Stop reason: HOSPADM

## 2022-06-07 RX ORDER — SODIUM CHLORIDE, SODIUM LACTATE, POTASSIUM CHLORIDE, CALCIUM CHLORIDE 600; 310; 30; 20 MG/100ML; MG/100ML; MG/100ML; MG/100ML
INJECTION, SOLUTION INTRAVENOUS CONTINUOUS
Status: DISCONTINUED | OUTPATIENT
Start: 2022-06-07 | End: 2022-06-07 | Stop reason: HOSPADM

## 2022-06-07 RX ORDER — SUCCINYLCHOLINE CHLORIDE 20 MG/ML
INJECTION INTRAMUSCULAR; INTRAVENOUS
Status: DISCONTINUED | OUTPATIENT
Start: 2022-06-07 | End: 2022-06-07

## 2022-06-07 RX ORDER — DIPHENHYDRAMINE HYDROCHLORIDE 50 MG/ML
12.5 INJECTION INTRAMUSCULAR; INTRAVENOUS EVERY 6 HOURS PRN
Status: DISCONTINUED | OUTPATIENT
Start: 2022-06-07 | End: 2022-06-07 | Stop reason: HOSPADM

## 2022-06-07 RX ORDER — HYDROCODONE BITARTRATE AND ACETAMINOPHEN 5; 325 MG/1; MG/1
1 TABLET ORAL EVERY 4 HOURS PRN
Status: DISCONTINUED | OUTPATIENT
Start: 2022-06-07 | End: 2022-06-07 | Stop reason: HOSPADM

## 2022-06-07 RX ORDER — SODIUM CHLORIDE 0.9 % (FLUSH) 0.9 %
3 SYRINGE (ML) INJECTION
Status: DISCONTINUED | OUTPATIENT
Start: 2022-06-07 | End: 2022-06-07 | Stop reason: HOSPADM

## 2022-06-07 RX ORDER — EPHEDRINE SULFATE 50 MG/ML
INJECTION, SOLUTION INTRAVENOUS
Status: DISCONTINUED | OUTPATIENT
Start: 2022-06-07 | End: 2022-06-07

## 2022-06-07 RX ORDER — PHENYLEPHRINE HYDROCHLORIDE 10 MG/ML
INJECTION INTRAVENOUS
Status: DISCONTINUED | OUTPATIENT
Start: 2022-06-07 | End: 2022-06-07

## 2022-06-07 RX ORDER — PROPOFOL 10 MG/ML
VIAL (ML) INTRAVENOUS
Status: DISCONTINUED | OUTPATIENT
Start: 2022-06-07 | End: 2022-06-07

## 2022-06-07 RX ORDER — ONDANSETRON 8 MG/1
8 TABLET, ORALLY DISINTEGRATING ORAL EVERY 8 HOURS PRN
Status: DISCONTINUED | OUTPATIENT
Start: 2022-06-07 | End: 2022-06-07 | Stop reason: HOSPADM

## 2022-06-07 RX ORDER — LIDOCAINE HCL/PF 100 MG/5ML
SYRINGE (ML) INTRAVENOUS
Status: DISCONTINUED | OUTPATIENT
Start: 2022-06-07 | End: 2022-06-07

## 2022-06-07 RX ORDER — HYDROMORPHONE HYDROCHLORIDE 2 MG/ML
0.5 INJECTION, SOLUTION INTRAMUSCULAR; INTRAVENOUS; SUBCUTANEOUS EVERY 5 MIN PRN
Status: DISCONTINUED | OUTPATIENT
Start: 2022-06-07 | End: 2022-06-07 | Stop reason: HOSPADM

## 2022-06-07 RX ORDER — LIDOCAINE HYDROCHLORIDE 10 MG/ML
1 INJECTION, SOLUTION EPIDURAL; INFILTRATION; INTRACAUDAL; PERINEURAL ONCE
Status: DISCONTINUED | OUTPATIENT
Start: 2022-06-07 | End: 2022-06-07 | Stop reason: HOSPADM

## 2022-06-07 RX ORDER — ONDANSETRON 2 MG/ML
INJECTION INTRAMUSCULAR; INTRAVENOUS
Status: DISCONTINUED | OUTPATIENT
Start: 2022-06-07 | End: 2022-06-07

## 2022-06-07 RX ORDER — FENTANYL CITRATE 50 UG/ML
INJECTION, SOLUTION INTRAMUSCULAR; INTRAVENOUS
Status: DISCONTINUED | OUTPATIENT
Start: 2022-06-07 | End: 2022-06-07

## 2022-06-07 RX ORDER — CEFAZOLIN SODIUM 2 G/50ML
2 SOLUTION INTRAVENOUS
Status: DISCONTINUED | OUTPATIENT
Start: 2022-06-07 | End: 2022-06-07 | Stop reason: HOSPADM

## 2022-06-07 RX ORDER — OXYCODONE HYDROCHLORIDE 5 MG/1
10 TABLET ORAL EVERY 4 HOURS PRN
Status: DISCONTINUED | OUTPATIENT
Start: 2022-06-07 | End: 2022-06-07 | Stop reason: HOSPADM

## 2022-06-07 RX ORDER — KETOROLAC TROMETHAMINE 30 MG/ML
INJECTION, SOLUTION INTRAMUSCULAR; INTRAVENOUS
Status: DISCONTINUED | OUTPATIENT
Start: 2022-06-07 | End: 2022-06-07

## 2022-06-07 RX ORDER — OXYCODONE AND ACETAMINOPHEN 5; 325 MG/1; MG/1
1 TABLET ORAL EVERY 4 HOURS PRN
Qty: 20 TABLET | Refills: 0 | Status: SHIPPED | OUTPATIENT
Start: 2022-06-07 | End: 2022-07-19

## 2022-06-07 RX ADMIN — KETOROLAC TROMETHAMINE 30 MG: 30 INJECTION, SOLUTION INTRAMUSCULAR; INTRAVENOUS at 08:06

## 2022-06-07 RX ADMIN — PHENYLEPHRINE HYDROCHLORIDE 200 MCG: 10 INJECTION INTRAVENOUS at 07:06

## 2022-06-07 RX ADMIN — EPHEDRINE SULFATE 10 MG: 50 INJECTION, SOLUTION INTRAMUSCULAR; INTRAVENOUS; SUBCUTANEOUS at 07:06

## 2022-06-07 RX ADMIN — SUCCINYLCHOLINE CHLORIDE 120 MG: 20 INJECTION, SOLUTION INTRAMUSCULAR; INTRAVENOUS at 07:06

## 2022-06-07 RX ADMIN — CEFAZOLIN SODIUM 3 G: 2 SOLUTION INTRAVENOUS at 07:06

## 2022-06-07 RX ADMIN — HYDROMORPHONE HYDROCHLORIDE 0.5 MG: 2 INJECTION, SOLUTION INTRAMUSCULAR; INTRAVENOUS; SUBCUTANEOUS at 08:06

## 2022-06-07 RX ADMIN — EPHEDRINE SULFATE 15 MG: 50 INJECTION, SOLUTION INTRAMUSCULAR; INTRAVENOUS; SUBCUTANEOUS at 07:06

## 2022-06-07 RX ADMIN — LIDOCAINE HYDROCHLORIDE 100 MG: 20 INJECTION, SOLUTION INTRAVENOUS at 07:06

## 2022-06-07 RX ADMIN — ONDANSETRON 4 MG: 2 INJECTION, SOLUTION INTRAMUSCULAR; INTRAVENOUS at 07:06

## 2022-06-07 RX ADMIN — HYDROCODONE BITARTRATE AND ACETAMINOPHEN 1 TABLET: 5; 325 TABLET ORAL at 09:06

## 2022-06-07 RX ADMIN — GLYCOPYRROLATE 0.2 MG: 0.2 INJECTION, SOLUTION INTRAMUSCULAR; INTRAVITREAL at 07:06

## 2022-06-07 RX ADMIN — SODIUM CHLORIDE, SODIUM LACTATE, POTASSIUM CHLORIDE, AND CALCIUM CHLORIDE: .6; .31; .03; .02 INJECTION, SOLUTION INTRAVENOUS at 06:06

## 2022-06-07 RX ADMIN — PHENYLEPHRINE HYDROCHLORIDE 100 MCG: 10 INJECTION INTRAVENOUS at 07:06

## 2022-06-07 RX ADMIN — PHENYLEPHRINE HYDROCHLORIDE 150 MCG: 10 INJECTION INTRAVENOUS at 07:06

## 2022-06-07 RX ADMIN — ESMOLOL HYDROCHLORIDE 10 MG: 10 INJECTION INTRAVENOUS at 08:06

## 2022-06-07 RX ADMIN — EPHEDRINE SULFATE 5 MG: 50 INJECTION, SOLUTION INTRAMUSCULAR; INTRAVENOUS; SUBCUTANEOUS at 07:06

## 2022-06-07 RX ADMIN — PROPOFOL 200 MG: 10 INJECTION, EMULSION INTRAVENOUS at 07:06

## 2022-06-07 RX ADMIN — FENTANYL CITRATE 50 MCG: 50 INJECTION, SOLUTION INTRAMUSCULAR; INTRAVENOUS at 07:06

## 2022-06-07 NOTE — ANESTHESIA POSTPROCEDURE EVALUATION
Anesthesia Post Evaluation    Patient: Aminta Schreiber    Procedure(s) Performed: Procedure(s) (LRB):  EXCISION, LIPOMA (Right)    Final Anesthesia Type: general      Patient location during evaluation: PACU  Patient participation: Yes- Able to Participate  Level of consciousness: awake and alert, oriented and awake  Post-procedure vital signs: reviewed and stable  Pain management: adequate  Airway patency: patent    PONV status at discharge: No PONV  Anesthetic complications: no      Cardiovascular status: blood pressure returned to baseline  Respiratory status: unassisted and room air  Hydration status: euvolemic  Follow-up not needed.          Vitals Value Taken Time   BP 87/45 06/07/22 0906   Temp 36.6 °C (97.9 °F) 06/07/22 0832   Pulse 86 06/07/22 0908   Resp 12 06/07/22 0908   SpO2 94 % 06/07/22 0908   Vitals shown include unvalidated device data.      No case tracking events are documented in the log.      Pain/Kimberly Score: Pain Rating Prior to Med Admin: 6 (6/7/2022  8:40 AM)  Kimberly Score: 9 (6/7/2022  8:32 AM)

## 2022-06-07 NOTE — OP NOTE
Certification of Assistant at Surgery       Surgery Date: 6/7/2022     Participating Surgeons:  Surgeon(s) and Role:     * Odell Escamilla Jr., MD - Primary    Procedures:  Procedure(s) (LRB):  EXCISION, LIPOMA (Right)    Assistant Surgeon's Certification of Necessity:  I understand that section 1842 (b) (6) (d) of the Social Security Act generally prohibits Medicare Part B reasonable charge payment for the services of assistants at surgery in teaching hospitals when qualified residents are available to furnish such services. I certify that the services for which payment is claimed were medically necessary, and that no qualified resident was available to perform the services. I further understand that these services are subject to post-payment review by the Medicare carrier.      Emily Nair PA-C    06/07/2022  8:25 AM

## 2022-06-07 NOTE — PLAN OF CARE
Patient has met PACU discharge criteria, VSS, pain well controlled. Family updated by phone. Released from PACU by Dr. Abdul

## 2022-06-07 NOTE — ADDENDUM NOTE
Addendum  created 06/07/22 1249 by Hiram Stratton Jr., CRNA    Child order released for a procedure order, Clinical Note Signed, Intraprocedure Blocks edited, LDA created via procedure documentation, SmartForm saved

## 2022-06-07 NOTE — TRANSFER OF CARE
"Anesthesia Transfer of Care Note    Patient: Aminta Schreiber    Procedure(s) Performed: Procedure(s) (LRB):  EXCISION, LIPOMA (Right)    Patient location: PACU    Anesthesia Type: general    Transport from OR: Transported from OR on 6-10 L/min O2 by face mask with adequate spontaneous ventilation    Post pain: adequate analgesia    Post assessment: no apparent anesthetic complications and tolerated procedure well    Post vital signs: stable    Level of consciousness: awake    Nausea/Vomiting: no nausea/vomiting    Complications: none    Transfer of care protocol was followed      Last vitals:   Visit Vitals  BP (!) 131/58 (BP Location: Right arm, Patient Position: Lying)   Pulse 76   Temp 36.4 °C (97.6 °F) (Skin)   Resp 20   Ht 5' 5" (1.651 m)   Wt 133.4 kg (294 lb)   SpO2 97%   Breastfeeding No   BMI 48.92 kg/m²     "

## 2022-06-07 NOTE — H&P
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 female with ongoing symptoms right hand and forearm related to a soft tissue mass  Now having some weakness with finger extension thumb and index finger of the right hand  Recent MRI confirms a large lipoma causing compression on the posterior interosseous nerve            PAST MEDICAL HISTORY:        Past Medical History:   Diagnosis Date    Diabetes mellitus, type 2      GERD (gastroesophageal reflux disease)      Gross hematuria 04/30/2019    H/O left breast biopsy      Hyperlipidemia      Hypertension      Kidney stone      Migraines      Nephrolithiasis 07/24/2018    Personal history of colonic polyps 01/15/2014    Raynaud's disease      Renal calculus, right 11/23/2015    Renal colic on right side 11/23/2015    Right ureteral calculus 04/30/2019    Urinary tract infection      Vaginal infection        PAST SURGICAL HISTORY:         Past Surgical History:   Procedure Laterality Date    BREAST CYST EXCISION        EXTRACORPOREAL SHOCK WAVE LITHOTRIPSY Right 8/9/2018     Procedure: LITHOTRIPSY, ESWL;  Surgeon: Milton Garland MD;  Location: [facility];  Service: Urology;  Laterality: Right;    EXTRACORPOREAL SHOCK WAVE LITHOTRIPSY Right 5/2/2019     Procedure: LITHOTRIPSY, ESWL;  Surgeon: Milton Garland MD;  Location: [facility] OR;  Service: Urology;  Laterality: Right;    EXTRACORPOREAL SHOCK WAVE LITHOTRIPSY Right 7/25/2019     Procedure: LITHOTRIPSY, ESWL;  Surgeon: Milton Garland MD;  Location: [facility] OR;  Service: Urology;  Laterality: Right;    HYSTERECTOMY         Partial    LITHOTRIPSY         x 3 or 4 per pt      FAMILY HISTORY:         Family History   Problem Relation Age of Onset    Diabetes Mother      Hypertension Mother      Cancer Father      No Known Problems Brother      Migraines Daughter      No Known Problems Son       "Hypertension Son      Kidney disease Neg Hx        SOCIAL HISTORY:   Social History               Socioeconomic History    Marital status:     Number of children: 3   Tobacco Use    Smoking status: Current Every Day Smoker       Packs/day: 0.50       Types: Cigarettes    Smokeless tobacco: Never Used   Substance and Sexual Activity    Alcohol use: Yes       Alcohol/week: 1.0 standard drink       Types: 1 Standard drinks or equivalent per week       Comment: 'on occasion"    Drug use: No    Sexual activity: Yes       Partners: Male       Birth control/protection: Surgical   Social History Narrative     .  Three children.  Works at [employer]            MEDICATIONS:   Current Outpatient Medications:     ascorbic acid, vitamin C, (VITAMIN C) 1000 MG tablet, Take 1,000 mg by mouth once daily., Disp: , Rfl:     cholecalciferol, vitamin D3, (VITAMIN D3) 50 mcg (2,000 unit) Cap, Take 1 capsule (2,000 Units total) by mouth once daily., Disp: 90 capsule, Rfl: 2    co-enzyme Q-10 30 mg capsule, Take 30 mg by mouth once daily., Disp: , Rfl:     cyanocobalamin (VITAMIN B-12) 1000 MCG tablet, Take 100 mcg by mouth once daily., Disp: , Rfl:     ferrous sulfate (FEOSOL) 325 mg (65 mg iron) Tab tablet, Take 1 tablet (325 mg total) by mouth once daily., Disp: 90 tablet, Rfl: 3    folic acid (FOLVITE) 1 MG tablet, Take 1 tablet (1,000 mcg total) by mouth once daily., Disp: 90 tablet, Rfl: 3    losartan-hydrochlorothiazide 50-12.5 mg (HYZAAR) 50-12.5 mg per tablet, Take 1 tablet by mouth 2 (two) times a day., Disp: 180 tablet, Rfl: 3    metFORMIN (GLUCOPHAGE-XR) 500 MG ER 24hr tablet, Take 2 tablets (1,000 mg total) by mouth once daily., Disp: 180 tablet, Rfl: 2    mirabegron (MYRBETRIQ) 50 mg Tb24, Take 1 tablet (50 mg total) by mouth once daily., Disp: 30 tablet, Rfl: 11    omeprazole (PRILOSEC) 40 MG capsule, Take 1 capsule (40 mg total) by mouth every morning., Disp: 90 capsule, Rfl: 3    " "rosuvastatin (CRESTOR) 10 MG tablet, Take 1 tablet (10 mg total) by mouth every evening., Disp: 90 tablet, Rfl: 2    SITagliptin (JANUVIA) 25 MG Tab, Take 1 tablet (25 mg total) by mouth once daily., Disp: 90 tablet, Rfl: 3    tamsulosin (FLOMAX) 0.4 mg Cap, Take 1 capsule (0.4 mg total) by mouth once daily., Disp: 30 capsule, Rfl: 11    valACYclovir (VALTREX) 1000 MG tablet, Take 1,000 mg by mouth once daily. , Disp: , Rfl:   ALLERGIES: Review of patient's allergies indicates:  No Known Allergies     Review of Systems:  Constitutional: no fever or chills  ENT: no nasal congestion or sore throat  Respiratory: no cough or shortness of breath  Cardiovascular: no chest pain or palpitations  Gastrointestinal: no nausea or vomiting, PUD, GERD, NSAID intolerance  Genitourinary: no hematuria or dysuria  Integument/Breast: no rash or pruritis  Hematologic/Lymphatic: no easy bruising or lymphadenopathy  Musculoskeletal: see HPI  Neurological: no seizures or tremors  Behavioral/Psych: no auditory or visual hallucinations        Physical Exam       Vitals:     04/25/22 1059   Weight: 133.4 kg (294 lb)   Height: 5' 5" (1.651 m)   PainSc: 0-No pain         Constitutional: Oriented to person, place, and time. Appears well-developed and well-nourished.   HENT:   Head: Normocephalic and atraumatic.   Nose: Nose normal.   Eyes: No scleral icterus.   Neck: Normal range of motion.   Cardiovascular: Normal rate and regular rhythm.    Pulses:       Radial pulses are 2+ on the right side, and 2+ on the left side.   Pulmonary/Chest: Effort normal and breath sounds normal.   Abdominal: Soft.   Neurological: Alert and oriented to person, place, and time.   Skin: Skin is warm.   Psychiatric: Normal mood and affect.      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  The consent was signed in the office for surgery.        Odell Escamilla MD (Jay)  [facility]  Orthopedic Upper Extremity Surgery       "

## 2022-06-07 NOTE — OP NOTE
[facility] - Surgery (Hospital)  Operative Note      Date of Procedure: 6/7/2022     Procedure: Procedure(s) (LRB):  EXCISION, LIPOMA (Right)     Surgeon(s) and Role:     * Odell Escamilla Jr., MD - Primary    Assisting Surgeon: None    Pre-Operative Diagnosis: Benign lipomatous neoplasm of skin, subcu of right arm [D17.21]    Post-Operative Diagnosis: Post-Op Diagnosis Codes:     * Benign lipomatous neoplasm of skin, subcu of right arm [D17.21]    Anesthesia: Regional    Operative Findings (including complications, if any):  Deep lipoma right forearm    Description of Technical Procedures:     Preop diagnosis:  Intramuscular lipoma right forearm (12 cm X 5 cm).    Postop diagnosis:  Same.    Operative procedure:  Excisional biopsy of lipoma right proximal forearm (12 cm X 5 cm).  With decompression of posterior interosseous nerve.    Surgeon:  Andi.    First assistant:  Korey.    Anesthesia:  General.    EBL:  Minimal.    Specimen:  Lipoma.    Operative procedure in detail as follows:    After operative consent was obtained patient brought the operating room placed supine operating table.  Anesthesia by general endotracheal method performed by the anesthesia staff.  A tourniquet applied to the right arm right upper extremity prepped and draped out in the normal sterile fashion.  Esmarch used exsanguinated and the tourniquet inflated 225 mmHg.  Following this a volar L-shaped incision made in the antecubital fossa with a 15 blade.  Careful dissection carried down through the subcutaneous tissue.  The brachial radialis was identified and reflected to the radial side.  Deep dissection carried down through the antecubital fossa onto a large lipoma which was causing compression on the posterior interosseous nerve.  At least 1 branch came directly over the lipoma was carefully decompressed and reflected to the radial side protecting its attachment and intervention site.  The rest of the lipoma was then carefully dissected  free using blunt dissection it was multilobulated but all portions of it were removed together.  Portion of the capsule have to had to be opened to decompress the nerve but most of the capsule was excised.  The wound thoroughly irrigated with antibiotic saline solution hemostasis achieved with Bovie.  All branches of the posterior osseous nerve were identified and carefully protected throughout the procedure.  Subcutaneous tissue closed with 3-0 Vicryl Steri-Strips on the skin.  Sterile dressing applied followed by light wrap.  Tourniquet deflated patient brought to the recovery room stable condition all sponge needle counts reported as correct no complications    Significant Surgical Tasks Conducted by the Assistant(s), if Applicable: retraction    Estimated Blood Loss (EBL): * No values recorded between 6/7/2022  7:35 AM and 6/7/2022  8:24 AM *           Implants: * No implants in log *    Specimens:   Specimen (24h ago, onward)             Start     Ordered    06/07/22 0747  Specimen to Pathology, Surgery Orthopedics  Once        Comments: Pre-op Diagnosis: Benign lipomatous neoplasm of skin, subcu of right arm [D17.21]Procedure(s):EXCISION, LIPOMA Number of specimens: 1Name of specimens:  Lipoma right arm     References:    Click here for ordering Quick Tip   Question Answer Comment   Procedure Type: Orthopedics    Which provider would you like to cc? AMANDA ESPINOZA JR    Release to patient Immediate        06/07/22 0748                        Condition: Good    Disposition: PACU - hemodynamically stable.    Attestation: I was present and scrubbed for the entire procedure.    Discharge Note    OUTCOME: Patient tolerated treatment/procedure well without complication and is now ready for discharge.    DISPOSITION: Home or Self Care    FINAL DIAGNOSIS:  Benign lipomatous neoplasm of skin, subcu of right arm    FOLLOWUP: In clinic    DISCHARGE INSTRUCTIONS:    Discharge Procedure Orders   Diet general     Leave  dressing on - Keep it clean, dry, and intact until clinic visit     Call MD for:  temperature >100.4     Call MD for:  persistent nausea and vomiting     Call MD for:  severe uncontrolled pain

## 2022-06-07 NOTE — ANESTHESIA PREPROCEDURE EVALUATION
06/07/2022  Aminta Schreiber is a 61 y.o., female presents for excision lipoma under GA/MAC.    Past Medical History:   Diagnosis Date    Diabetes mellitus, type 2     GERD (gastroesophageal reflux disease)     Gross hematuria 04/30/2019    H/O left breast biopsy     Hyperlipidemia     Hypertension     Kidney stone     Migraines     Nephrolithiasis 07/24/2018    Personal history of colonic polyps 01/15/2014    Raynaud's disease     Renal calculus, right 11/23/2015    Renal colic on right side 11/23/2015    Right ureteral calculus 04/30/2019    Urinary tract infection     Vaginal infection      Past Surgical History:   Procedure Laterality Date    BREAST CYST EXCISION      EXTRACORPOREAL SHOCK WAVE LITHOTRIPSY Right 8/9/2018    Procedure: LITHOTRIPSY, ESWL;  Surgeon: Milton Garland MD;  Location: [facility] OR;  Service: Urology;  Laterality: Right;    EXTRACORPOREAL SHOCK WAVE LITHOTRIPSY Right 5/2/2019    Procedure: LITHOTRIPSY, ESWL;  Surgeon: Milton Garland MD;  Location: [facility] OR;  Service: Urology;  Laterality: Right;    EXTRACORPOREAL SHOCK WAVE LITHOTRIPSY Right 7/25/2019    Procedure: LITHOTRIPSY, ESWL;  Surgeon: Milton Garland MD;  Location: [facility] OR;  Service: Urology;  Laterality: Right;    HYSTERECTOMY      Partial    LITHOTRIPSY      x 3 or 4 per pt           Pre-op Assessment    I have reviewed the Patient Summary Reports.     I have reviewed the Nursing Notes. I have reviewed the NPO Status.   I have reviewed the Medications.     Review of Systems  Anesthesia Hx:  No problems with previous Anesthesia    Cardiovascular:   Hypertension    Pulmonary:  Pulmonary Normal    Renal/:   Chronic Renal Disease    Hepatic/GI:   GERD    Musculoskeletal:   Arthritis     Neurological:   Headaches    Endocrine:   Diabetes    Psych:   Psychiatric History          Physical Exam  General: Well  nourished, Cooperative, Oriented and Alert    Airway:  Mallampati: II   Mouth Opening: Normal  Tongue: Normal    Chest/Lungs:  Normal Respiratory Rate, Clear to auscultation    Heart:  Rate: Normal  Rhythm: Regular Rhythm  Sounds: Normal        Anesthesia Plan  Type of Anesthesia, risks & benefits discussed:    Anesthesia Type: MAC, Gen ETT  Intra-op Monitoring Plan: Standard ASA Monitors  Post Op Pain Control Plan: multimodal analgesia  Induction:  IV  Airway Plan: Direct  Informed Consent: Informed consent signed with the Patient and all parties understand the risks and agree with anesthesia plan.  All questions answered.   ASA Score: 3    Ready For Surgery From Anesthesia Perspective.     .

## 2022-06-07 NOTE — PLAN OF CARE
Discharge criteria met,voicing desire to go home.Discharge instructions given to patient & spouse, verbalized understanding. Discharge home via wheelchair in care of spouse.

## 2022-06-07 NOTE — ANESTHESIA PROCEDURE NOTES
Intubation    Date/Time: 6/7/2022 7:16 AM  Performed by: Hiram Stratton Jr., CRNA  Authorized by: Genaro Abdul MD     Intubation:     Induction:  Intravenous    Intubated:  Postinduction    Mask Ventilation:  Easy with oral airway    Attempts:  1    Attempted By:  CRNA    Method of Intubation:  Video laryngoscopy    Blade:  Holly 3    Laryngeal View Grade: Grade I - full view of cords      Difficult Airway Encountered?: No      Complications:  None    Airway Device:  Oral endotracheal tube    Airway Device Size:  7.5    Style/Cuff Inflation:  Cuffed (inflated to minimal occlusive pressure)    Tube secured:  22    Secured at:  The lips    Placement Verified By:  Capnometry    Complicating Factors:  Obesity and short neck    Findings Post-Intubation:  BS equal bilateral and atraumatic/condition of teeth unchanged

## 2022-06-08 ENCOUNTER — PATIENT MESSAGE (OUTPATIENT)
Dept: ORTHOPEDICS | Facility: CLINIC | Age: 62
End: 2022-06-08
Payer: COMMERCIAL

## 2022-06-08 ENCOUNTER — TELEPHONE (OUTPATIENT)
Dept: ORTHOPEDICS | Facility: CLINIC | Age: 62
End: 2022-06-08
Payer: COMMERCIAL

## 2022-06-08 NOTE — TELEPHONE ENCOUNTER
----- Message from Odell Escamilla Jr., MD sent at 6/8/2022 10:47 AM CDT -----  Regarding: RE: Pt Advice  You mean lungs?  Could be some fluid in lungs. Continue deep breathing and monitor. Try advil 2-3 times a day  Maybe a heating pad to side  ----- Message -----  From: Madonna Nova MA  Sent: 6/8/2022   9:47 AM CDT  To: Odell Escamilla Jr., MD  Subject: FW: Pt Advice                                    Patient states that since her surgery, her right side has been hurting and hurts more when she coughs. Would like to know if this normal  ----- Message -----  From: Jane Blount  Sent: 6/8/2022   9:15 AM CDT  To: Andi TORRES Staff  Subject: Pt Advice                                        Type:  Needs Medical Advice    Who Called: pt     Symptoms (please be specific): pain inside following procedure yesterday, pain meds not helping    How long has patient had these symptoms:  2 days    Pharmacy name and phone #:  [id number] - [place] - [address] [facility]  [address] [place] [address]  Phone: [phone] Fax: [phone]    Would the patient rather a call back or a response via [facility]? Call    Best Call Back Number: [phone]

## 2022-06-08 NOTE — TELEPHONE ENCOUNTER
----- Message from Odell Escamilla Jr., MD sent at 6/8/2022 10:47 AM CDT -----  Regarding: RE: Pt Advice  You mean lungs?  Could be some fluid in lungs. Continue deep breathing and monitor. Try advil 2-3 times a day  Maybe a heating pad to side  ----- Message -----  From: Madonna Nova MA  Sent: 6/8/2022   9:47 AM CDT  To: Odell Escamilla Jr., MD  Subject: FW: Pt Advice                                    Patient states that since her surgery, her right side has been hurting and hurts more when she coughs. Would like to know if this normal  ----- Message -----  From: Jane Blount  Sent: 6/8/2022   9:15 AM CDT  To: Adni TORRES Staff  Subject: Pt Advice                                        Type:  Needs Medical Advice    Who Called: pt     Symptoms (please be specific): pain inside following procedure yesterday, pain meds not helping    How long has patient had these symptoms:  2 days    Pharmacy name and phone #:  [id number] - [place] - [address] [facility]  [address] [place] [address]  Phone: [phone] Fax: [phone]    Would the patient rather a call back or a response via [facility]? Call    Best Call Back Number: [phone]

## 2022-06-10 LAB
FINAL PATHOLOGIC DIAGNOSIS: NORMAL
GROSS: NORMAL
Lab: NORMAL
MICROSCOPIC EXAM: NORMAL

## 2022-06-14 ENCOUNTER — OFFICE VISIT (OUTPATIENT)
Dept: ORTHOPEDICS | Facility: CLINIC | Age: 62
End: 2022-06-14
Payer: COMMERCIAL

## 2022-06-14 VITALS — WEIGHT: 293 LBS | BODY MASS INDEX: 48.82 KG/M2 | HEIGHT: 65 IN

## 2022-06-14 DIAGNOSIS — D17.21 LIPOMA OF RIGHT UPPER EXTREMITY: ICD-10-CM

## 2022-06-14 DIAGNOSIS — Z12.31 ENCOUNTER FOR SCREENING MAMMOGRAM FOR BREAST CANCER: Primary | ICD-10-CM

## 2022-06-14 PROCEDURE — 3066F PR DOCUMENTATION OF TREATMENT FOR NEPHROPATHY: ICD-10-PCS | Mod: CPTII,S$GLB,, | Performed by: ORTHOPAEDIC SURGERY

## 2022-06-14 PROCEDURE — 1159F MED LIST DOCD IN RCRD: CPT | Mod: CPTII,S$GLB,, | Performed by: ORTHOPAEDIC SURGERY

## 2022-06-14 PROCEDURE — 99024 POSTOP FOLLOW-UP VISIT: CPT | Mod: S$GLB,,, | Performed by: ORTHOPAEDIC SURGERY

## 2022-06-14 PROCEDURE — 1159F PR MEDICATION LIST DOCUMENTED IN MEDICAL RECORD: ICD-10-PCS | Mod: CPTII,S$GLB,, | Performed by: ORTHOPAEDIC SURGERY

## 2022-06-14 PROCEDURE — 3051F PR MOST RECENT HEMOGLOBIN A1C LEVEL 7.0 - < 8.0%: ICD-10-PCS | Mod: CPTII,S$GLB,, | Performed by: ORTHOPAEDIC SURGERY

## 2022-06-14 PROCEDURE — 3008F PR BODY MASS INDEX (BMI) DOCUMENTED: ICD-10-PCS | Mod: CPTII,S$GLB,, | Performed by: ORTHOPAEDIC SURGERY

## 2022-06-14 PROCEDURE — 99999 PR PBB SHADOW E&M-EST. PATIENT-LVL III: ICD-10-PCS | Mod: PBBFAC,,, | Performed by: ORTHOPAEDIC SURGERY

## 2022-06-14 PROCEDURE — 3066F NEPHROPATHY DOC TX: CPT | Mod: CPTII,S$GLB,, | Performed by: ORTHOPAEDIC SURGERY

## 2022-06-14 PROCEDURE — 99999 PR PBB SHADOW E&M-EST. PATIENT-LVL III: CPT | Mod: PBBFAC,,, | Performed by: ORTHOPAEDIC SURGERY

## 2022-06-14 PROCEDURE — 3008F BODY MASS INDEX DOCD: CPT | Mod: CPTII,S$GLB,, | Performed by: ORTHOPAEDIC SURGERY

## 2022-06-14 PROCEDURE — 3051F HG A1C>EQUAL 7.0%<8.0%: CPT | Mod: CPTII,S$GLB,, | Performed by: ORTHOPAEDIC SURGERY

## 2022-06-14 PROCEDURE — 3061F NEG MICROALBUMINURIA REV: CPT | Mod: CPTII,S$GLB,, | Performed by: ORTHOPAEDIC SURGERY

## 2022-06-14 PROCEDURE — 99024 PR POST-OP FOLLOW-UP VISIT: ICD-10-PCS | Mod: S$GLB,,, | Performed by: ORTHOPAEDIC SURGERY

## 2022-06-14 PROCEDURE — 3061F PR NEG MICROALBUMINURIA RESULT DOCUMENTED/REVIEW: ICD-10-PCS | Mod: CPTII,S$GLB,, | Performed by: ORTHOPAEDIC SURGERY

## 2022-06-14 RX ORDER — KETOROLAC TROMETHAMINE 10 MG/1
10 TABLET, FILM COATED ORAL EVERY 6 HOURS
COMMUNITY
Start: 2022-05-16 | End: 2024-03-26

## 2022-06-14 NOTE — PROGRESS NOTES
Subjective:      Patient ID: Aminta Schreiber is a 62 y.o. female.  Chief Complaint: Post-op Evaluation (Right Arm ( Sx 6/7))      HPI  Aminta Schreiber is a  62 y.o. female presenting today for post op visit.  She is s/p excision lipoma right proximal forearm  She is 1 week postop  I explained to the patient that at the time of surgery we found the lipoma causing compression on the posterior interosseous nerve and this does explain the weakness of the right index finger  .     Review of patient's allergies indicates:  No Known Allergies      Current Outpatient Medications   Medication Sig Dispense Refill    ammonium lactate 12 % Crea Apply 1 application topically 2 (two) times daily. 140 g 2    ascorbic acid, vitamin C, (VITAMIN C) 1000 MG tablet Take 1,000 mg by mouth once daily.      cholecalciferol, vitamin D3, (VITAMIN D3) 50 mcg (2,000 unit) Cap Take 1 capsule (2,000 Units total) by mouth once daily. 90 capsule 2    ciclopirox (PENLAC) 8 % Soln Apply topically nightly. 6.6 mL 11    co-enzyme Q-10 30 mg capsule Take 30 mg by mouth once daily.      cyanocobalamin (VITAMIN B-12) 1000 MCG tablet Take 100 mcg by mouth once daily.      ferrous sulfate (FEOSOL) 325 mg (65 mg iron) Tab tablet Take 1 tablet (325 mg total) by mouth once daily. 90 tablet 3    folic acid (FOLVITE) 1 MG tablet Take 1 tablet (1,000 mcg total) by mouth once daily. 90 tablet 3    ketorolac (TORADOL) 10 mg tablet Take 10 mg by mouth every 6 (six) hours.      losartan-hydrochlorothiazide 50-12.5 mg (HYZAAR) 50-12.5 mg per tablet Take 1 tablet by mouth 2 (two) times a day. 180 tablet 3    metFORMIN (GLUCOPHAGE-XR) 500 MG ER 24hr tablet Take 2 tablets (1,000 mg total) by mouth once daily. 180 tablet 2    mirabegron (MYRBETRIQ) 50 mg Tb24 Take 1 tablet (50 mg total) by mouth once daily. 30 tablet 11    omeprazole (PRILOSEC) 40 MG capsule Take 1 capsule (40 mg total) by mouth every morning. 90 capsule 3    oxyCODONE-acetaminophen (PERCOCET) 5-325 mg  per tablet Take 1 tablet by mouth every 4 (four) hours as needed for Pain. 20 tablet 0    rosuvastatin (CRESTOR) 10 MG tablet Take 1 tablet (10 mg total) by mouth every evening. 90 tablet 2    SITagliptin (JANUVIA) 25 MG Tab Take 1 tablet (25 mg total) by mouth once daily. 90 tablet 3    tamsulosin (FLOMAX) 0.4 mg Cap Take 1 capsule (0.4 mg total) by mouth once daily. 30 capsule 11    valACYclovir (VALTREX) 1000 MG tablet Take 1,000 mg by mouth once daily.        No current facility-administered medications for this visit.       Past Medical History:   Diagnosis Date    Diabetes mellitus, type 2     GERD (gastroesophageal reflux disease)     Gross hematuria 04/30/2019    H/O left breast biopsy     Hyperlipidemia     Hypertension     Kidney stone     Migraines     Nephrolithiasis 07/24/2018    Personal history of colonic polyps 01/15/2014    Raynaud's disease     Renal calculus, right 11/23/2015    Renal colic on right side 11/23/2015    Right ureteral calculus 04/30/2019    Urinary tract infection     Vaginal infection        Past Surgical History:   Procedure Laterality Date    BREAST CYST EXCISION      EXTRACORPOREAL SHOCK WAVE LITHOTRIPSY Right 8/9/2018    Procedure: LITHOTRIPSY, ESWL;  Surgeon: Milton Garland MD;  Location: [facility] OR;  Service: Urology;  Laterality: Right;    EXTRACORPOREAL SHOCK WAVE LITHOTRIPSY Right 5/2/2019    Procedure: LITHOTRIPSY, ESWL;  Surgeon: Milton Garland MD;  Location: [facility] OR;  Service: Urology;  Laterality: Right;    EXTRACORPOREAL SHOCK WAVE LITHOTRIPSY Right 7/25/2019    Procedure: LITHOTRIPSY, ESWL;  Surgeon: Milton Garland MD;  Location: [facility] OR;  Service: Urology;  Laterality: Right;    HYSTERECTOMY      Partial    LIPOMA RESECTION Right 6/7/2022    Procedure: EXCISION, LIPOMA;  Surgeon: Odell Escamilla Jr., MD;  Location: [facility] OR;  Service: Orthopedics;  Laterality: Right;  include nerve exploration    LITHOTRIPSY      x 3 or 4 per pt  "      OBJECTIVE:   PHYSICAL EXAM:  Height: 5' 5" (165.1 cm) Weight: 133.4 kg (294 lb)  Vitals:    06/14/22 0858   Weight: 133.4 kg (294 lb)   Height: 5' 5" (1.651 m)   PainSc: 0-No pain     Ortho/SPM Exam  On exam the right arm looks good incision healing well Steri-Stri  ps in place minimal swelling range of motion elbow fingers full has good wrist and finger extension except for the right index finger where she did preoperatively    RADIOGRAPHS:  None  Comments: I have personally reviewed the imaging and I agree with the above radiologist's report.    ASSESSMENT/PLAN:     IMPRESSION:  Status post excision lipoma and decompression right proximal forearm    PLAN:  Routine wound care with Steri-Strips  Advance activities as tolerated  No heavy lifting      FOLLOW UP:  3-4 weeks    Disclaimer: This note has been generated using voice-recognition software. There may be typographical errors that have been missed during proof-reading.    "

## 2022-06-27 ENCOUNTER — OFFICE VISIT (OUTPATIENT)
Dept: FAMILY MEDICINE | Facility: CLINIC | Age: 62
End: 2022-06-27
Payer: COMMERCIAL

## 2022-06-27 VITALS
SYSTOLIC BLOOD PRESSURE: 106 MMHG | WEIGHT: 292.13 LBS | DIASTOLIC BLOOD PRESSURE: 72 MMHG | BODY MASS INDEX: 48.67 KG/M2 | HEIGHT: 65 IN

## 2022-06-27 DIAGNOSIS — E11.69 HYPERLIPIDEMIA ASSOCIATED WITH TYPE 2 DIABETES MELLITUS: ICD-10-CM

## 2022-06-27 DIAGNOSIS — J31.0 CHRONIC RHINITIS: ICD-10-CM

## 2022-06-27 DIAGNOSIS — R91.1 PULMONARY NODULE 1 CM OR GREATER IN DIAMETER: ICD-10-CM

## 2022-06-27 DIAGNOSIS — E78.5 HYPERLIPIDEMIA ASSOCIATED WITH TYPE 2 DIABETES MELLITUS: ICD-10-CM

## 2022-06-27 DIAGNOSIS — D17.21 BENIGN LIPOMATOUS NEOPLASM OF SKIN, SUBCU OF RIGHT ARM: ICD-10-CM

## 2022-06-27 DIAGNOSIS — E11.65 TYPE 2 DIABETES MELLITUS WITH HYPERGLYCEMIA, WITHOUT LONG-TERM CURRENT USE OF INSULIN: Primary | ICD-10-CM

## 2022-06-27 PROBLEM — R41.3 MEMORY CHANGES: Status: RESOLVED | Noted: 2021-09-29 | Resolved: 2022-06-27

## 2022-06-27 PROBLEM — F43.23 ADJUSTMENT DISORDER WITH MIXED ANXIETY AND DEPRESSED MOOD: Status: RESOLVED | Noted: 2021-11-03 | Resolved: 2022-06-27

## 2022-06-27 PROCEDURE — 3078F PR MOST RECENT DIASTOLIC BLOOD PRESSURE < 80 MM HG: ICD-10-PCS | Mod: CPTII,S$GLB,, | Performed by: FAMILY MEDICINE

## 2022-06-27 PROCEDURE — 1159F MED LIST DOCD IN RCRD: CPT | Mod: CPTII,S$GLB,, | Performed by: FAMILY MEDICINE

## 2022-06-27 PROCEDURE — 99999 PR PBB SHADOW E&M-EST. PATIENT-LVL V: ICD-10-PCS | Mod: PBBFAC,,, | Performed by: FAMILY MEDICINE

## 2022-06-27 PROCEDURE — 3066F PR DOCUMENTATION OF TREATMENT FOR NEPHROPATHY: ICD-10-PCS | Mod: CPTII,S$GLB,, | Performed by: FAMILY MEDICINE

## 2022-06-27 PROCEDURE — 3061F NEG MICROALBUMINURIA REV: CPT | Mod: CPTII,S$GLB,, | Performed by: FAMILY MEDICINE

## 2022-06-27 PROCEDURE — 1160F RVW MEDS BY RX/DR IN RCRD: CPT | Mod: CPTII,S$GLB,, | Performed by: FAMILY MEDICINE

## 2022-06-27 PROCEDURE — 3008F BODY MASS INDEX DOCD: CPT | Mod: CPTII,S$GLB,, | Performed by: FAMILY MEDICINE

## 2022-06-27 PROCEDURE — 99214 PR OFFICE/OUTPT VISIT, EST, LEVL IV, 30-39 MIN: ICD-10-PCS | Mod: S$GLB,,, | Performed by: FAMILY MEDICINE

## 2022-06-27 PROCEDURE — 3061F PR NEG MICROALBUMINURIA RESULT DOCUMENTED/REVIEW: ICD-10-PCS | Mod: CPTII,S$GLB,, | Performed by: FAMILY MEDICINE

## 2022-06-27 PROCEDURE — 99214 OFFICE O/P EST MOD 30 MIN: CPT | Mod: S$GLB,,, | Performed by: FAMILY MEDICINE

## 2022-06-27 PROCEDURE — 3074F PR MOST RECENT SYSTOLIC BLOOD PRESSURE < 130 MM HG: ICD-10-PCS | Mod: CPTII,S$GLB,, | Performed by: FAMILY MEDICINE

## 2022-06-27 PROCEDURE — 3066F NEPHROPATHY DOC TX: CPT | Mod: CPTII,S$GLB,, | Performed by: FAMILY MEDICINE

## 2022-06-27 PROCEDURE — 3074F SYST BP LT 130 MM HG: CPT | Mod: CPTII,S$GLB,, | Performed by: FAMILY MEDICINE

## 2022-06-27 PROCEDURE — 99999 PR PBB SHADOW E&M-EST. PATIENT-LVL V: CPT | Mod: PBBFAC,,, | Performed by: FAMILY MEDICINE

## 2022-06-27 PROCEDURE — 3008F PR BODY MASS INDEX (BMI) DOCUMENTED: ICD-10-PCS | Mod: CPTII,S$GLB,, | Performed by: FAMILY MEDICINE

## 2022-06-27 PROCEDURE — 1160F PR REVIEW ALL MEDS BY PRESCRIBER/CLIN PHARMACIST DOCUMENTED: ICD-10-PCS | Mod: CPTII,S$GLB,, | Performed by: FAMILY MEDICINE

## 2022-06-27 PROCEDURE — 3044F HG A1C LEVEL LT 7.0%: CPT | Mod: CPTII,S$GLB,, | Performed by: FAMILY MEDICINE

## 2022-06-27 PROCEDURE — 1159F PR MEDICATION LIST DOCUMENTED IN MEDICAL RECORD: ICD-10-PCS | Mod: CPTII,S$GLB,, | Performed by: FAMILY MEDICINE

## 2022-06-27 PROCEDURE — 3044F PR MOST RECENT HEMOGLOBIN A1C LEVEL <7.0%: ICD-10-PCS | Mod: CPTII,S$GLB,, | Performed by: FAMILY MEDICINE

## 2022-06-27 PROCEDURE — 3078F DIAST BP <80 MM HG: CPT | Mod: CPTII,S$GLB,, | Performed by: FAMILY MEDICINE

## 2022-06-27 RX ORDER — ZOSTER VACCINE RECOMBINANT, ADJUVANTED 50 MCG/0.5
KIT INTRAMUSCULAR
Qty: 1 EACH | Refills: 1 | Status: CANCELLED | OUTPATIENT
Start: 2022-06-27

## 2022-06-27 RX ORDER — FLUTICASONE PROPIONATE 50 MCG
1 SPRAY, SUSPENSION (ML) NASAL DAILY
Qty: 16 G | Refills: 0 | Status: SHIPPED | OUTPATIENT
Start: 2022-06-27

## 2022-06-27 NOTE — PROGRESS NOTES
FAMILY MEDICINE  [facility]    Reason for visit:   Chief Complaint   Patient presents with    Follow-up       HPI: Aminta Schreiber is a 62 y.o. female  - smoker (started 14 yo 1/2 pack per day= 48 years) obesity, type 2 diabetes, hypertension, hyperlipidemia, Raynaud's, GERD, myelolipoma bilateral adrenal glands, chronic low back pain, history of renal stones, fasciitis with bone spurs and overactive bladder presents to follow-up diabetes and labs      Urology Brogle (nephrolithiasis)  Rheumatology: Dr. Live Lacey [facility]  Endocrine: Dr. Scott Arevalo (monitor adrenals and no issues)  Gynecology Dr. Vero Sanz  Ophthalmology: Dr. Mallorie Garcia.   GI: [facility]. She cancelled her 2022 colonoscopy appt and will reschedule  Ortho Dr. Escamilla    She presents with her .They are staying a camper outside of their house since 2021 but there is an end in sight but likely not until the end of the year. She enjoyed her cruise.    Since last visit Dr. Escamilla removed right forearm lipoma that was growing and causing pain. She is pleased     1. Diabetes Type 2     Age diagnosed: 50's     Current treatment regimen:   metFORMIN (GLUCOPHAGE-XR) 500 MG ER 24hr tablet, Take 2 tablets (1,000 mg total) by mouth once daily., Disp: 180 tablet, Rfl: 2  SITagliptin (JANUVIA) 25 MG Tab, Take 1 tablet (25 mg total) by mouth once daily., Disp: 90 tablet, Rfl: 3  - started  last visit with me was on 3/31/2022.     Side effects from treatment: denies  Complications of diabetes: none     Glucometer: yes  Glucose monitoring: fasting daily or s/p dinner  A. Fastin-140 m/dL     Lab Results       Component                Value               Date                       HGBA1C                   6.5 (H)             2022              Lab Results       Component                Value               Date                       HGBA1C                   7.3 (H)             2022               Lab Results        Component                Value               Date                       HGBA1C                   6.8 (H)             12/14/2021              Lab Results       Component                Value               Date                       HGBA1C                   6.5 (H)             09/22/2021               Low dose statin: yes  Last eye exam: Dr. Mallorie Garcia [facility] 3/22/21. Scheduled with Dr. Boyer 7/18/22  Last foot exam:  9/29/21     Vaccines:   Influenza:  9/29/21  Pneumovax: 23 7/20/2020  Prevnar 13: NA  COVID-19:  Vaccinated      2. Hypertension  - DM2, HLD  - BP goal < 140/90     Current medication treatment:   losartan-hydrochlorothiazide 50-12.5 mg (HYZAAR) 50-12.5 mg per tablet, Take 1 tablet by mouth 2 (two) times a day., Disp: 180 tablet, Rfl: 3    Prior:   losartan-hydrochlorothiazide 100-25 mg (HYZAAR) 100-25 mg per tablet, Take 1 tablet by mouth once daily  - she did not like it     Medication side effects: denies     3. Hyperlipidemia  - diabetes, hypertension and hyperlipidemia  - LDL goal < 100    Current treatment:  rosuvastatin (CRESTOR) 10 MG tablet, Take 1 tablet (10 mg total) by mouth every evening., Disp: 90 tablet, Rfl: 2    Side effects from current treatment: none    Lab Results       Component                Value               Date                       CHOL                     131                 09/22/2021                 CHOL                     136                 09/25/2020            Lab Results       Component                Value               Date                       HDL                      44                  09/22/2021                 HDL                      47                  09/25/2020            Lab Results       Component                Value               Date                       LDLCALC                  74.0                09/22/2021                 LDLCALC                  79.8                09/25/2020            Lab Results       Component                 Value               Date                       TRIG                     65                  09/22/2021                 TRIG                     46                  09/25/2020            Lab Results       Component                Value               Date                       CHOLHDL                  33.6                09/22/2021                 CHOLHDL                  34.6                09/25/2020                      Review of Systems   All other systems reviewed and are negative.      ALLERGIES:   Review of patient's allergies indicates:  No Known Allergies    MEDS:     Current Outpatient Medications:     ammonium lactate 12 % Crea, Apply 1 application topically 2 (two) times daily., Disp: 140 g, Rfl: 2    ascorbic acid, vitamin C, (VITAMIN C) 1000 MG tablet, Take 1,000 mg by mouth once daily., Disp: , Rfl:     cholecalciferol, vitamin D3, (VITAMIN D3) 50 mcg (2,000 unit) Cap, Take 1 capsule (2,000 Units total) by mouth once daily., Disp: 90 capsule, Rfl: 2    ciclopirox (PENLAC) 8 % Soln, Apply topically nightly., Disp: 6.6 mL, Rfl: 11    co-enzyme Q-10 30 mg capsule, Take 30 mg by mouth once daily., Disp: , Rfl:     cyanocobalamin (VITAMIN B-12) 1000 MCG tablet, Take 100 mcg by mouth once daily., Disp: , Rfl:     ferrous sulfate (FEOSOL) 325 mg (65 mg iron) Tab tablet, Take 1 tablet (325 mg total) by mouth once daily., Disp: 90 tablet, Rfl: 3    folic acid (FOLVITE) 1 MG tablet, Take 1 tablet (1,000 mcg total) by mouth once daily., Disp: 90 tablet, Rfl: 3    ketorolac (TORADOL) 10 mg tablet, Take 10 mg by mouth every 6 (six) hours., Disp: , Rfl:     losartan-hydrochlorothiazide 50-12.5 mg (HYZAAR) 50-12.5 mg per tablet, Take 1 tablet by mouth 2 (two) times a day., Disp: 180 tablet, Rfl: 3    metFORMIN (GLUCOPHAGE-XR) 500 MG ER 24hr tablet, Take 2 tablets (1,000 mg total) by mouth once daily., Disp: 180 tablet, Rfl: 2    mirabegron (MYRBETRIQ) 50 mg Tb24, Take 1 tablet (50 mg total) by mouth once  "daily., Disp: 30 tablet, Rfl: 11    omeprazole (PRILOSEC) 40 MG capsule, Take 1 capsule (40 mg total) by mouth every morning., Disp: 90 capsule, Rfl: 3    oxyCODONE-acetaminophen (PERCOCET) 5-325 mg per tablet, Take 1 tablet by mouth every 4 (four) hours as needed for Pain., Disp: 20 tablet, Rfl: 0    rosuvastatin (CRESTOR) 10 MG tablet, Take 1 tablet (10 mg total) by mouth every evening., Disp: 90 tablet, Rfl: 2    SITagliptin (JANUVIA) 25 MG Tab, Take 1 tablet (25 mg total) by mouth once daily., Disp: 90 tablet, Rfl: 3    tamsulosin (FLOMAX) 0.4 mg Cap, Take 1 capsule (0.4 mg total) by mouth once daily., Disp: 30 capsule, Rfl: 11    valACYclovir (VALTREX) 1000 MG tablet, Take 1,000 mg by mouth once daily. , Disp: , Rfl:     fluticasone propionate (FLONASE) 50 mcg/actuation nasal spray, 1 spray (50 mcg total) by Each Nostril route once daily., Disp: 16 g, Rfl: 0    Vital signs:   Vitals:    06/27/22 1104   BP: 106/72   Weight: 132.5 kg (292 lb 1.6 oz)   Height: 5' 5" (1.651 m)     Body mass index is 48.61 kg/m².    PHYSICAL EXAM:     Physical Exam  Constitutional:       General: She is not in acute distress.  Cardiovascular:      Rate and Rhythm: Normal rate and regular rhythm.      Heart sounds: Normal heart sounds. No murmur heard.    No friction rub. No gallop.   Pulmonary:      Effort: Pulmonary effort is normal.      Breath sounds: Normal breath sounds. No wheezing, rhonchi or rales.   Musculoskeletal:      Cervical back: Neck supple.      Right lower leg: No edema.      Left lower leg: No edema.   Neurological:      Mental Status: She is alert.           PHQ4 = Score: 0    PERTINENT RESULTS:   Lab Visit on 06/24/2022   Component Date Value Ref Range Status    Hemoglobin A1C 06/24/2022 6.5 (A) 4.0 - 5.6 % Final    Comment: ADA Screening Guidelines:  5.7-6.4%  Consistent with prediabetes  >or=6.5%  Consistent with diabetes    High levels of fetal hemoglobin interfere with the HbA1C  assay. Heterozygous " hemoglobin variants (HbS, HgC, etc)do  not significantly interfere with this assay.   However, presence of multiple variants may affect accuracy.      Estimated Avg Glucose 06/24/2022 140 (A) 68 - 131 mg/dL Final     CMP  Sodium   Date Value Ref Range Status   06/07/2022 144 136 - 145 mmol/L Final     Potassium   Date Value Ref Range Status   06/07/2022 3.3 (L) 3.5 - 5.1 mmol/L Final     Chloride   Date Value Ref Range Status   06/07/2022 102 95 - 110 mmol/L Final     CO2   Date Value Ref Range Status   06/07/2022 30 (H) 23 - 29 mmol/L Final     Glucose   Date Value Ref Range Status   06/07/2022 135 (H) 70 - 110 mg/dL Final     BUN   Date Value Ref Range Status   06/07/2022 12 8 - 23 mg/dL Final     Creatinine   Date Value Ref Range Status   06/07/2022 0.8 0.5 - 1.4 mg/dL Final     Calcium   Date Value Ref Range Status   06/07/2022 9.4 8.7 - 10.5 mg/dL Final     Total Protein   Date Value Ref Range Status   03/25/2022 7.3 6.0 - 8.4 g/dL Final     Albumin   Date Value Ref Range Status   03/25/2022 4.1 3.5 - 5.2 g/dL Final     Total Bilirubin   Date Value Ref Range Status   03/25/2022 0.3 0.1 - 1.0 mg/dL Final     Comment:     For infants and newborns, interpretation of results should be based  on gestational age, weight and in agreement with clinical  observations.    Premature Infant recommended reference ranges:  Up to 24 hours.............<8.0 mg/dL  Up to 48 hours............<12.0 mg/dL  3-5 days..................<15.0 mg/dL  6-29 days.................<15.0 mg/dL       Alkaline Phosphatase   Date Value Ref Range Status   03/25/2022 87 38 - 126 U/L Final     AST   Date Value Ref Range Status   03/25/2022 21 15 - 46 U/L Final     ALT   Date Value Ref Range Status   03/25/2022 20 10 - 44 U/L Final     Anion Gap   Date Value Ref Range Status   06/07/2022 12 8 - 16 mmol/L Final     eGFR if    Date Value Ref Range Status   06/07/2022 >60 >60 mL/min/1.73 m^2 Final     eGFR if non    Date  Value Ref Range Status   06/07/2022 >60 >60 mL/min/1.73 m^2 Final     Comment:     Calculation used to obtain the estimated glomerular filtration  rate (eGFR) is the CKD-EPI equation.        Lab Results   Component Value Date    CHOL 129 03/25/2022    CHOL 131 09/22/2021    CHOL 136 09/25/2020     Lab Results   Component Value Date    HDL 45 03/25/2022    HDL 44 09/22/2021    HDL 47 09/25/2020     Lab Results   Component Value Date    LDLCALC 75.0 03/25/2022    LDLCALC 74.0 09/22/2021    LDLCALC 79.8 09/25/2020     Lab Results   Component Value Date    TRIG 45 03/25/2022    TRIG 65 09/22/2021    TRIG 46 09/25/2020     Lab Results   Component Value Date    CHOLHDL 34.9 03/25/2022    CHOLHDL 33.6 09/22/2021    CHOLHDL 34.6 09/25/2020       ASSESSMENT/PLAN:  Problem List Items Addressed This Visit        ENT    Chronic rhinitis    Overview     - well controlled  - continue current management plan   - patient encouraged to notify me with any changes           Relevant Medications    fluticasone propionate (FLONASE) 50 mcg/actuation nasal spray       Pulmonary    Pulmonary nodule 1 cm or greater in diameter    Overview     - 12/14/21 LDCT: 12 mm pleural base nodule versus noncalcified pleural plaque right middle lobe.  Lung-RADS Category:  4A - Suspicious - consultation advised - possible next steps 3 month LDCT -in some scenarios PET/CT  - 3/15/22 LDCT: Lung-RADS Category:  3 - Probably Benign- 6 month LDCT.  The right middle lobe, solid, peripheral nodule (11.7 x 3.6 mm) with average diameter of 7.65 mm requires additional evaluation.  Recommend six-month follow-up CT following initial detection.  - pulmonary following and ordered 6 month CT                Cardiac/Vascular    Hyperlipidemia associated with type 2 diabetes mellitus    Overview     Lab Results   Component Value Date    LDLCALC 74.0 09/22/2021     - well controlled  - continue current medication           Relevant Orders    Comprehensive Metabolic Panel     Lipid Panel    Hemoglobin A1C       Oncology    RESOLVED: Benign lipomatous neoplasm of skin, subcu of right arm    Overview     - followed by Dr. Lacey  - US done in the past  - s/p removal 5/2022 benign lipoma              Endocrine    Type 2 diabetes mellitus with hyperglycemia, without long-term current use of insulin - Primary    Overview     Lab Results   Component Value Date    HGBA1C 6.5 (H) 06/24/2022     - well controlled  - continue current management plan   - patient encouraged to notify me with any changes           Relevant Orders    Comprehensive Metabolic Panel    Lipid Panel    Hemoglobin A1C          ORDERS:   Orders Placed This Encounter    Comprehensive Metabolic Panel    Lipid Panel    Hemoglobin A1C    fluticasone propionate (FLONASE) 50 mcg/actuation nasal spray       Vaccines recommended: covid-19 booster  - Tetanus and Shingles  - PCV 20 when available    Follow-up in 3- 6 months with labs or sooner if any concerns.    This note is dictated using the M*Modal Fluency Direct word recognition program. There are word recognition mistakes that are occasionally missed on review.    Dr. Verenice Chase D.O.   Family Medicine

## 2022-06-28 ENCOUNTER — TELEPHONE (OUTPATIENT)
Dept: ORTHOPEDICS | Facility: CLINIC | Age: 62
End: 2022-06-28
Payer: COMMERCIAL

## 2022-06-28 NOTE — TELEPHONE ENCOUNTER
----- Message from Efrain Armstrong sent at 6/28/2022 10:47 AM CDT -----  Type:  Patient Returning Call    Who Called:Pt   Would the patient rather a call back or a response via [facility]? Call   Best Call Back Number:[phone]  Additional Information:   Checking on the status of fmla forms

## 2022-07-06 ENCOUNTER — TELEPHONE (OUTPATIENT)
Dept: ORTHOPEDICS | Facility: CLINIC | Age: 62
End: 2022-07-06
Payer: COMMERCIAL

## 2022-07-06 NOTE — TELEPHONE ENCOUNTER
----- Message from Renzorichmond Betts sent at 7/6/2022  3:49 PM CDT -----  Contact: pt  Type: Requesting to speak with nurse        Who Called: PT  Regarding: she dropped off paperwork (work release form) and would like to know if its been filled out and faxed over  Would the patient rather a call back or a response via [facility]? Call back  Best Call Back Number: [phone]  Additional Information:

## 2022-07-06 NOTE — TELEPHONE ENCOUNTER
Spoke with patient. Patient stated her  will be picking up paperwork tomorrow at the [place] location.

## 2022-07-15 NOTE — PROGRESS NOTES
Subjective:      Patient ID: Aminta Schreiber is a 62 y.o. female.  Chief Complaint: Postop Follow Up    HPI  Aminta Schreiber  returns postoperatively today approximately 5 weeks after the following procedure:    Surgery:  Excisional biopsy of lipoma right proximal forearm (12 cm X 5 cm).  With decompression of posterior interosseous nerve.  Date:  06/07/2022  Surgeon: Dr. Escamilla      Overall, the patient is doing well with no complaints of discomfort to operative region. She continues to note right index digit weakness that has not shown any improvement following surgery. Pt denies additional complaints.    Current Outpatient Medications   Medication Sig Dispense Refill    ammonium lactate 12 % Crea Apply 1 application topically 2 (two) times daily. 140 g 2    ascorbic acid, vitamin C, (VITAMIN C) 1000 MG tablet Take 1,000 mg by mouth once daily.      cholecalciferol, vitamin D3, (VITAMIN D3) 50 mcg (2,000 unit) Cap Take 1 capsule (2,000 Units total) by mouth once daily. 90 capsule 2    ciclopirox (PENLAC) 8 % Soln Apply topically nightly. 6.6 mL 11    co-enzyme Q-10 30 mg capsule Take 30 mg by mouth once daily.      cyanocobalamin (VITAMIN B-12) 1000 MCG tablet Take 100 mcg by mouth once daily.      ferrous sulfate (FEOSOL) 325 mg (65 mg iron) Tab tablet Take 1 tablet (325 mg total) by mouth once daily. 90 tablet 3    fluticasone propionate (FLONASE) 50 mcg/actuation nasal spray 1 spray (50 mcg total) by Each Nostril route once daily. 16 g 0    folic acid (FOLVITE) 1 MG tablet Take 1 tablet (1,000 mcg total) by mouth once daily. 90 tablet 3    ketorolac (TORADOL) 10 mg tablet Take 10 mg by mouth every 6 (six) hours.      losartan-hydrochlorothiazide 50-12.5 mg (HYZAAR) 50-12.5 mg per tablet Take 1 tablet by mouth 2 (two) times a day. 180 tablet 3    metFORMIN (GLUCOPHAGE-XR) 500 MG ER 24hr tablet Take 2 tablets (1,000 mg total) by mouth once daily. 180 tablet 2    mirabegron (MYRBETRIQ) 50 mg Tb24 Take 1 tablet (50  mg total) by mouth once daily. 30 tablet 11    omeprazole (PRILOSEC) 40 MG capsule Take 1 capsule (40 mg total) by mouth every morning. 90 capsule 3    oxyCODONE-acetaminophen (PERCOCET) 5-325 mg per tablet Take 1 tablet by mouth every 4 (four) hours as needed for Pain. 20 tablet 0    rosuvastatin (CRESTOR) 10 MG tablet Take 1 tablet (10 mg total) by mouth every evening. 90 tablet 2    SITagliptin (JANUVIA) 25 MG Tab Take 1 tablet (25 mg total) by mouth once daily. 90 tablet 3    tamsulosin (FLOMAX) 0.4 mg Cap Take 1 capsule (0.4 mg total) by mouth once daily. 30 capsule 11    valACYclovir (VALTREX) 1000 MG tablet Take 1,000 mg by mouth once daily.        No current facility-administered medications for this visit.       Past Medical History:   Diagnosis Date    Diabetes mellitus, type 2     GERD (gastroesophageal reflux disease)     Gross hematuria 04/30/2019    H/O left breast biopsy     Hyperlipidemia     Hypertension     Kidney stone     Migraines     Nephrolithiasis 07/24/2018    Personal history of colonic polyps 01/15/2014    Raynaud's disease     Renal calculus, right 11/23/2015    Renal colic on right side 11/23/2015    Right ureteral calculus 04/30/2019    Urinary tract infection     Vaginal infection        Past Surgical History:   Procedure Laterality Date    BREAST CYST EXCISION      EXTRACORPOREAL SHOCK WAVE LITHOTRIPSY Right 8/9/2018    Procedure: LITHOTRIPSY, ESWL;  Surgeon: Milton Garland MD;  Location: [facility] OR;  Service: Urology;  Laterality: Right;    EXTRACORPOREAL SHOCK WAVE LITHOTRIPSY Right 5/2/2019    Procedure: LITHOTRIPSY, ESWL;  Surgeon: Milton Garland MD;  Location: [facility] OR;  Service: Urology;  Laterality: Right;    EXTRACORPOREAL SHOCK WAVE LITHOTRIPSY Right 7/25/2019    Procedure: LITHOTRIPSY, ESWL;  Surgeon: Milton Garland MD;  Location: [facility] OR;  Service: Urology;  Laterality: Right;    HYSTERECTOMY      Partial    LIPOMA RESECTION Right 6/7/2022     "Procedure: EXCISION, LIPOMA;  Surgeon: Odell Escamilla Jr., MD;  Location: [facility];  Service: Orthopedics;  Laterality: Right;  include nerve exploration    LITHOTRIPSY      x 3 or 4 per pt     Review of Systems   Constitutional: Negative for chills and fever.   Respiratory: Negative for shortness of breath.    Cardiovascular: Negative for chest pain and leg swelling.   Gastrointestinal: Negative for nausea and vomiting.   Genitourinary: Negative for dysuria.   Musculoskeletal: Negative for falls.   Skin: Negative for rash.   Neurological: Negative for dizziness and sensory change.     OBJECTIVE:   PHYSICAL EXAM:        Ht 5' 5" (1.651 m)   Wt 132.5 kg (292 lb)   BMI 48.59 kg/m²   Ortho/SPM Exam  R forearm incision well healed with appropriate scar formation.  SILT to r/m/u n. Distally.  Radial pulse palpable.  R index digit weakness to extension past 0 degrees at MCP. Wrist extension intact.    RADIOGRAPHS:  None  ASSESSMENT/PLAN:     IMPRESSION:  Status post excision lipoma and decompression right proximal forearm    PLAN:  Advance activities as tolerated  OT referral for one-time session to learn home exercise program.  Scar massage with Vit E/lotion    We had a long discussion that she may have persistent weakness in this index digit given the prolonged amount of time the nerve was compressed prior to surgery. She verbalized understanding.      FOLLOW UP:  6 weeks for re-eval following OT        "

## 2022-07-18 LAB
LEFT EYE DM RETINOPATHY: NEGATIVE
RIGHT EYE DM RETINOPATHY: NEGATIVE

## 2022-07-19 ENCOUNTER — OFFICE VISIT (OUTPATIENT)
Dept: ORTHOPEDICS | Facility: CLINIC | Age: 62
End: 2022-07-19
Payer: COMMERCIAL

## 2022-07-19 VITALS — HEIGHT: 65 IN | WEIGHT: 292 LBS | BODY MASS INDEX: 48.65 KG/M2

## 2022-07-19 DIAGNOSIS — D17.21 LIPOMA OF RIGHT UPPER EXTREMITY: Primary | ICD-10-CM

## 2022-07-19 PROCEDURE — 3044F HG A1C LEVEL LT 7.0%: CPT | Mod: CPTII,S$GLB,, | Performed by: PHYSICIAN ASSISTANT

## 2022-07-19 PROCEDURE — 1159F MED LIST DOCD IN RCRD: CPT | Mod: CPTII,S$GLB,, | Performed by: PHYSICIAN ASSISTANT

## 2022-07-19 PROCEDURE — 3061F NEG MICROALBUMINURIA REV: CPT | Mod: CPTII,S$GLB,, | Performed by: PHYSICIAN ASSISTANT

## 2022-07-19 PROCEDURE — 3044F PR MOST RECENT HEMOGLOBIN A1C LEVEL <7.0%: ICD-10-PCS | Mod: CPTII,S$GLB,, | Performed by: PHYSICIAN ASSISTANT

## 2022-07-19 PROCEDURE — 1160F PR REVIEW ALL MEDS BY PRESCRIBER/CLIN PHARMACIST DOCUMENTED: ICD-10-PCS | Mod: CPTII,S$GLB,, | Performed by: PHYSICIAN ASSISTANT

## 2022-07-19 PROCEDURE — 3008F PR BODY MASS INDEX (BMI) DOCUMENTED: ICD-10-PCS | Mod: CPTII,S$GLB,, | Performed by: PHYSICIAN ASSISTANT

## 2022-07-19 PROCEDURE — 3061F PR NEG MICROALBUMINURIA RESULT DOCUMENTED/REVIEW: ICD-10-PCS | Mod: CPTII,S$GLB,, | Performed by: PHYSICIAN ASSISTANT

## 2022-07-19 PROCEDURE — 3066F NEPHROPATHY DOC TX: CPT | Mod: CPTII,S$GLB,, | Performed by: PHYSICIAN ASSISTANT

## 2022-07-19 PROCEDURE — 99024 PR POST-OP FOLLOW-UP VISIT: ICD-10-PCS | Mod: S$GLB,,, | Performed by: PHYSICIAN ASSISTANT

## 2022-07-19 PROCEDURE — 3066F PR DOCUMENTATION OF TREATMENT FOR NEPHROPATHY: ICD-10-PCS | Mod: CPTII,S$GLB,, | Performed by: PHYSICIAN ASSISTANT

## 2022-07-19 PROCEDURE — 99024 POSTOP FOLLOW-UP VISIT: CPT | Mod: S$GLB,,, | Performed by: PHYSICIAN ASSISTANT

## 2022-07-19 PROCEDURE — 1160F RVW MEDS BY RX/DR IN RCRD: CPT | Mod: CPTII,S$GLB,, | Performed by: PHYSICIAN ASSISTANT

## 2022-07-19 PROCEDURE — 99999 PR PBB SHADOW E&M-EST. PATIENT-LVL IV: CPT | Mod: PBBFAC,,, | Performed by: PHYSICIAN ASSISTANT

## 2022-07-19 PROCEDURE — 99999 PR PBB SHADOW E&M-EST. PATIENT-LVL IV: ICD-10-PCS | Mod: PBBFAC,,, | Performed by: PHYSICIAN ASSISTANT

## 2022-07-19 PROCEDURE — 3008F BODY MASS INDEX DOCD: CPT | Mod: CPTII,S$GLB,, | Performed by: PHYSICIAN ASSISTANT

## 2022-07-19 PROCEDURE — 1159F PR MEDICATION LIST DOCUMENTED IN MEDICAL RECORD: ICD-10-PCS | Mod: CPTII,S$GLB,, | Performed by: PHYSICIAN ASSISTANT

## 2022-07-28 ENCOUNTER — PATIENT MESSAGE (OUTPATIENT)
Dept: FAMILY MEDICINE | Facility: CLINIC | Age: 62
End: 2022-07-28
Payer: COMMERCIAL

## 2022-08-24 ENCOUNTER — PATIENT MESSAGE (OUTPATIENT)
Dept: ADMINISTRATIVE | Facility: HOSPITAL | Age: 62
End: 2022-08-24
Payer: COMMERCIAL

## 2022-09-06 ENCOUNTER — OFFICE VISIT (OUTPATIENT)
Dept: ORTHOPEDICS | Facility: CLINIC | Age: 62
End: 2022-09-06
Payer: COMMERCIAL

## 2022-09-06 VITALS — BODY MASS INDEX: 48.65 KG/M2 | HEIGHT: 65 IN | WEIGHT: 292 LBS

## 2022-09-06 DIAGNOSIS — D17.21 LIPOMA OF RIGHT UPPER EXTREMITY: Primary | ICD-10-CM

## 2022-09-06 PROCEDURE — 3066F PR DOCUMENTATION OF TREATMENT FOR NEPHROPATHY: ICD-10-PCS | Mod: CPTII,S$GLB,, | Performed by: PHYSICIAN ASSISTANT

## 2022-09-06 PROCEDURE — 99999 PR PBB SHADOW E&M-EST. PATIENT-LVL III: CPT | Mod: PBBFAC,,, | Performed by: PHYSICIAN ASSISTANT

## 2022-09-06 PROCEDURE — 1159F PR MEDICATION LIST DOCUMENTED IN MEDICAL RECORD: ICD-10-PCS | Mod: CPTII,S$GLB,, | Performed by: PHYSICIAN ASSISTANT

## 2022-09-06 PROCEDURE — 3044F HG A1C LEVEL LT 7.0%: CPT | Mod: CPTII,S$GLB,, | Performed by: PHYSICIAN ASSISTANT

## 2022-09-06 PROCEDURE — 99999 PR PBB SHADOW E&M-EST. PATIENT-LVL III: ICD-10-PCS | Mod: PBBFAC,,, | Performed by: PHYSICIAN ASSISTANT

## 2022-09-06 PROCEDURE — 3061F PR NEG MICROALBUMINURIA RESULT DOCUMENTED/REVIEW: ICD-10-PCS | Mod: CPTII,S$GLB,, | Performed by: PHYSICIAN ASSISTANT

## 2022-09-06 PROCEDURE — 1160F RVW MEDS BY RX/DR IN RCRD: CPT | Mod: CPTII,S$GLB,, | Performed by: PHYSICIAN ASSISTANT

## 2022-09-06 PROCEDURE — 3061F NEG MICROALBUMINURIA REV: CPT | Mod: CPTII,S$GLB,, | Performed by: PHYSICIAN ASSISTANT

## 2022-09-06 PROCEDURE — 3066F NEPHROPATHY DOC TX: CPT | Mod: CPTII,S$GLB,, | Performed by: PHYSICIAN ASSISTANT

## 2022-09-06 PROCEDURE — 99024 POSTOP FOLLOW-UP VISIT: CPT | Mod: S$GLB,,, | Performed by: PHYSICIAN ASSISTANT

## 2022-09-06 PROCEDURE — 1160F PR REVIEW ALL MEDS BY PRESCRIBER/CLIN PHARMACIST DOCUMENTED: ICD-10-PCS | Mod: CPTII,S$GLB,, | Performed by: PHYSICIAN ASSISTANT

## 2022-09-06 PROCEDURE — 3008F BODY MASS INDEX DOCD: CPT | Mod: CPTII,S$GLB,, | Performed by: PHYSICIAN ASSISTANT

## 2022-09-06 PROCEDURE — 3008F PR BODY MASS INDEX (BMI) DOCUMENTED: ICD-10-PCS | Mod: CPTII,S$GLB,, | Performed by: PHYSICIAN ASSISTANT

## 2022-09-06 PROCEDURE — 3044F PR MOST RECENT HEMOGLOBIN A1C LEVEL <7.0%: ICD-10-PCS | Mod: CPTII,S$GLB,, | Performed by: PHYSICIAN ASSISTANT

## 2022-09-06 PROCEDURE — 1159F MED LIST DOCD IN RCRD: CPT | Mod: CPTII,S$GLB,, | Performed by: PHYSICIAN ASSISTANT

## 2022-09-06 PROCEDURE — 99024 PR POST-OP FOLLOW-UP VISIT: ICD-10-PCS | Mod: S$GLB,,, | Performed by: PHYSICIAN ASSISTANT

## 2022-09-06 NOTE — PROGRESS NOTES
Subjective:      Patient ID: Aminta Schreiber is a 62 y.o. female.  Chief Complaint: Postop Follow Up    HPI  Aminta Schreiber  returns postoperatively today approximately 5 weeks after the following procedure:    Surgery:  Excisional biopsy of lipoma right proximal forearm (12 cm X 5 cm).  With decompression of posterior interosseous nerve.  Date:  06/07/2022  Surgeon: Dr. Escamilla      The patient is doing well with no complaints.  She did not attend previously prescribed PT to work on index ROM/strength, and is pleased with her functional level.     Current Outpatient Medications   Medication Sig Dispense Refill    ammonium lactate 12 % Crea Apply 1 application topically 2 (two) times daily. 140 g 2    ascorbic acid, vitamin C, (VITAMIN C) 1000 MG tablet Take 1,000 mg by mouth once daily.      cholecalciferol, vitamin D3, (VITAMIN D3) 50 mcg (2,000 unit) Cap Take 1 capsule (2,000 Units total) by mouth once daily. 90 capsule 2    ciclopirox (PENLAC) 8 % Soln Apply topically nightly. 6.6 mL 11    co-enzyme Q-10 30 mg capsule Take 30 mg by mouth once daily.      cyanocobalamin (VITAMIN B-12) 1000 MCG tablet Take 100 mcg by mouth once daily.      ferrous sulfate (FEOSOL) 325 mg (65 mg iron) Tab tablet Take 1 tablet (325 mg total) by mouth once daily. 90 tablet 3    fluticasone propionate (FLONASE) 50 mcg/actuation nasal spray 1 spray (50 mcg total) by Each Nostril route once daily. 16 g 0    folic acid (FOLVITE) 1 MG tablet Take 1 tablet (1,000 mcg total) by mouth once daily. 90 tablet 3    ketorolac (TORADOL) 10 mg tablet Take 10 mg by mouth every 6 (six) hours.      losartan-hydrochlorothiazide 50-12.5 mg (HYZAAR) 50-12.5 mg per tablet Take 1 tablet by mouth 2 (two) times a day. 180 tablet 3    metFORMIN (GLUCOPHAGE-XR) 500 MG ER 24hr tablet Take 2 tablets (1,000 mg total) by mouth once daily. 180 tablet 2    mirabegron (MYRBETRIQ) 50 mg Tb24 Take 1 tablet (50 mg total) by mouth once daily. 30 tablet 11    omeprazole (PRILOSEC) 40  MG capsule Take 1 capsule (40 mg total) by mouth every morning. 90 capsule 3    rosuvastatin (CRESTOR) 10 MG tablet Take 1 tablet (10 mg total) by mouth every evening. 90 tablet 2    SITagliptin (JANUVIA) 25 MG Tab Take 1 tablet (25 mg total) by mouth once daily. 90 tablet 3    tamsulosin (FLOMAX) 0.4 mg Cap Take 1 capsule (0.4 mg total) by mouth once daily. 30 capsule 11    valACYclovir (VALTREX) 1000 MG tablet Take 1,000 mg by mouth once daily.        No current facility-administered medications for this visit.       Past Medical History:   Diagnosis Date    Diabetes mellitus, type 2     GERD (gastroesophageal reflux disease)     Gross hematuria 04/30/2019    H/O left breast biopsy     Hyperlipidemia     Hypertension     Kidney stone     Migraines     Nephrolithiasis 07/24/2018    Personal history of colonic polyps 01/15/2014    Raynaud's disease     Renal calculus, right 11/23/2015    Renal colic on right side 11/23/2015    Right ureteral calculus 04/30/2019    Urinary tract infection     Vaginal infection        Past Surgical History:   Procedure Laterality Date    BREAST CYST EXCISION      EXTRACORPOREAL SHOCK WAVE LITHOTRIPSY Right 8/9/2018    Procedure: LITHOTRIPSY, ESWL;  Surgeon: Milton Garland MD;  Location: [facility] OR;  Service: Urology;  Laterality: Right;    EXTRACORPOREAL SHOCK WAVE LITHOTRIPSY Right 5/2/2019    Procedure: LITHOTRIPSY, ESWL;  Surgeon: Milton Garland MD;  Location: [facility] OR;  Service: Urology;  Laterality: Right;    EXTRACORPOREAL SHOCK WAVE LITHOTRIPSY Right 7/25/2019    Procedure: LITHOTRIPSY, ESWL;  Surgeon: Milton Garland MD;  Location: [facility] OR;  Service: Urology;  Laterality: Right;    HYSTERECTOMY      Partial    LIPOMA RESECTION Right 6/7/2022    Procedure: EXCISION, LIPOMA;  Surgeon: Odell Escamilla Jr., MD;  Location: [facility] OR;  Service: Orthopedics;  Laterality: Right;  include nerve exploration    LITHOTRIPSY      x 3 or 4 per pt     Review of Systems   Constitutional:   "Negative for chills and fever.   Respiratory:  Negative for shortness of breath.    Cardiovascular:  Negative for chest pain and leg swelling.   Gastrointestinal:  Negative for nausea and vomiting.   Genitourinary:  Negative for dysuria.   Musculoskeletal:  Negative for falls.   Skin:  Negative for rash.   Neurological:  Negative for dizziness and sensory change.   OBJECTIVE:   PHYSICAL EXAM:        Ht 5' 5" (1.651 m)   Wt 132.5 kg (292 lb)   BMI 48.59 kg/m²    Ortho/SPM Exam  R forearm incision well healed with appropriate scar formation.  SILT to r/m/u n. Distally.  Radial pulse palpable.  R index digit weakness to extension past 0 degrees at MCP. Otherwise full digit and wrist ROM. 5/5  strenth. Wrist extension intact.    RADIOGRAPHS:  None obtained today  ASSESSMENT/PLAN:     IMPRESSION:  Status post excision lipoma and decompression right proximal forearm    PLAN:  Advance activities as tolerated  Scar massage with Vit E/lotion      FOLLOW UP:  PRN      "

## 2022-09-07 ENCOUNTER — PATIENT MESSAGE (OUTPATIENT)
Dept: UROLOGY | Facility: CLINIC | Age: 62
End: 2022-09-07
Payer: COMMERCIAL

## 2022-09-08 ENCOUNTER — TELEPHONE (OUTPATIENT)
Dept: UROLOGY | Facility: CLINIC | Age: 62
End: 2022-09-08
Payer: COMMERCIAL

## 2022-09-08 DIAGNOSIS — Z87.442 HISTORY OF NEPHROLITHIASIS: Primary | ICD-10-CM

## 2022-09-15 ENCOUNTER — PATIENT MESSAGE (OUTPATIENT)
Dept: UROLOGY | Facility: CLINIC | Age: 62
End: 2022-09-15
Payer: COMMERCIAL

## 2022-10-09 ENCOUNTER — PATIENT MESSAGE (OUTPATIENT)
Dept: UROLOGY | Facility: CLINIC | Age: 62
End: 2022-10-09
Payer: COMMERCIAL

## 2022-10-11 ENCOUNTER — PATIENT MESSAGE (OUTPATIENT)
Dept: UROLOGY | Facility: CLINIC | Age: 62
End: 2022-10-11
Payer: COMMERCIAL

## 2022-11-22 ENCOUNTER — OFFICE VISIT (OUTPATIENT)
Dept: UROLOGY | Facility: CLINIC | Age: 62
End: 2022-11-22
Payer: COMMERCIAL

## 2022-11-22 VITALS
HEART RATE: 70 BPM | WEIGHT: 293 LBS | HEIGHT: 65 IN | SYSTOLIC BLOOD PRESSURE: 124 MMHG | DIASTOLIC BLOOD PRESSURE: 72 MMHG | BODY MASS INDEX: 48.82 KG/M2

## 2022-11-22 DIAGNOSIS — R10.9 ABDOMINAL PAIN, UNSPECIFIED ABDOMINAL LOCATION: Primary | ICD-10-CM

## 2022-11-22 DIAGNOSIS — N20.0 CALCIUM NEPHROLITHIASIS: ICD-10-CM

## 2022-11-22 DIAGNOSIS — R10.9 BILATERAL FLANK PAIN: ICD-10-CM

## 2022-11-22 PROCEDURE — 3066F NEPHROPATHY DOC TX: CPT | Mod: CPTII,S$GLB,, | Performed by: UROLOGY

## 2022-11-22 PROCEDURE — 3044F HG A1C LEVEL LT 7.0%: CPT | Mod: CPTII,S$GLB,, | Performed by: UROLOGY

## 2022-11-22 PROCEDURE — 3061F NEG MICROALBUMINURIA REV: CPT | Mod: CPTII,S$GLB,, | Performed by: UROLOGY

## 2022-11-22 PROCEDURE — 3078F DIAST BP <80 MM HG: CPT | Mod: CPTII,S$GLB,, | Performed by: UROLOGY

## 2022-11-22 PROCEDURE — 3008F BODY MASS INDEX DOCD: CPT | Mod: CPTII,S$GLB,, | Performed by: UROLOGY

## 2022-11-22 PROCEDURE — 3008F PR BODY MASS INDEX (BMI) DOCUMENTED: ICD-10-PCS | Mod: CPTII,S$GLB,, | Performed by: UROLOGY

## 2022-11-22 PROCEDURE — 3044F PR MOST RECENT HEMOGLOBIN A1C LEVEL <7.0%: ICD-10-PCS | Mod: CPTII,S$GLB,, | Performed by: UROLOGY

## 2022-11-22 PROCEDURE — 3061F PR NEG MICROALBUMINURIA RESULT DOCUMENTED/REVIEW: ICD-10-PCS | Mod: CPTII,S$GLB,, | Performed by: UROLOGY

## 2022-11-22 PROCEDURE — 1160F PR REVIEW ALL MEDS BY PRESCRIBER/CLIN PHARMACIST DOCUMENTED: ICD-10-PCS | Mod: CPTII,S$GLB,, | Performed by: UROLOGY

## 2022-11-22 PROCEDURE — 3074F PR MOST RECENT SYSTOLIC BLOOD PRESSURE < 130 MM HG: ICD-10-PCS | Mod: CPTII,S$GLB,, | Performed by: UROLOGY

## 2022-11-22 PROCEDURE — 1159F MED LIST DOCD IN RCRD: CPT | Mod: CPTII,S$GLB,, | Performed by: UROLOGY

## 2022-11-22 PROCEDURE — 1159F PR MEDICATION LIST DOCUMENTED IN MEDICAL RECORD: ICD-10-PCS | Mod: CPTII,S$GLB,, | Performed by: UROLOGY

## 2022-11-22 PROCEDURE — 1160F RVW MEDS BY RX/DR IN RCRD: CPT | Mod: CPTII,S$GLB,, | Performed by: UROLOGY

## 2022-11-22 PROCEDURE — 99999 PR PBB SHADOW E&M-EST. PATIENT-LVL V: CPT | Mod: PBBFAC,,, | Performed by: UROLOGY

## 2022-11-22 PROCEDURE — 99999 PR PBB SHADOW E&M-EST. PATIENT-LVL V: ICD-10-PCS | Mod: PBBFAC,,, | Performed by: UROLOGY

## 2022-11-22 PROCEDURE — 3074F SYST BP LT 130 MM HG: CPT | Mod: CPTII,S$GLB,, | Performed by: UROLOGY

## 2022-11-22 PROCEDURE — 99214 PR OFFICE/OUTPT VISIT, EST, LEVL IV, 30-39 MIN: ICD-10-PCS | Mod: S$GLB,,, | Performed by: UROLOGY

## 2022-11-22 PROCEDURE — 3078F PR MOST RECENT DIASTOLIC BLOOD PRESSURE < 80 MM HG: ICD-10-PCS | Mod: CPTII,S$GLB,, | Performed by: UROLOGY

## 2022-11-22 PROCEDURE — 99214 OFFICE O/P EST MOD 30 MIN: CPT | Mod: S$GLB,,, | Performed by: UROLOGY

## 2022-11-22 PROCEDURE — 3066F PR DOCUMENTATION OF TREATMENT FOR NEPHROPATHY: ICD-10-PCS | Mod: CPTII,S$GLB,, | Performed by: UROLOGY

## 2022-11-22 RX ORDER — TRIAMCINOLONE ACETONIDE 1 MG/G
CREAM TOPICAL
COMMUNITY
Start: 2022-10-11 | End: 2024-03-26

## 2022-11-22 RX ORDER — BENZONATATE 200 MG/1
CAPSULE ORAL
COMMUNITY
Start: 2022-06-23 | End: 2023-04-24

## 2022-11-22 RX ORDER — VALACYCLOVIR HYDROCHLORIDE 1 G/1
1 TABLET, FILM COATED ORAL DAILY
COMMUNITY
Start: 2022-11-07

## 2022-11-22 RX ORDER — AMOXICILLIN AND CLAVULANATE POTASSIUM 875; 125 MG/1; MG/1
1 TABLET, FILM COATED ORAL 2 TIMES DAILY
COMMUNITY
Start: 2022-06-23 | End: 2024-03-26

## 2022-11-22 RX ORDER — TRIAMCINOLONE ACETONIDE 1 MG/G
CREAM TOPICAL
COMMUNITY
End: 2024-03-26

## 2022-11-22 NOTE — PATIENT INSTRUCTIONS
Obtain CT abdomen pelvis stone study.  Notify patient of results.  Scheduled treatment with extracorporeal shockwave lithotripsy if necessary.  If no stones are identified will consider seeing the spine doctor or physical therapy to see if we can relieve her of her discomfort.

## 2022-11-22 NOTE — PROGRESS NOTES
Subjective:       Patient ID: Aminta Schreiber is a 62 y.o. female.    Chief Complaint: Rt side pain - hx of kidney stones     63 yo female with history of nephrolithiasis in the past.  The patient has been having bilateral flank pain.  With pain worse on the right side however the left side pain is radiating somewhat around the abdominal wall.  The patient has been having this pain intermittently and worsening over the last 4-6 weeks.    Flank Pain  This is a new problem. The current episode started more than 1 month ago. The problem occurs constantly. The problem is unchanged. The pain is present in the costovertebral angle. The quality of the pain is described as aching. Radiates to: Left side radiates around abdominal wall.  Right-sided not radiating. The pain is at a severity of 5/10. The pain is moderate. The pain is Worse during the day (Improves with moving and is worst 1st thing in the morning). Stiffness is present In the morning. Pertinent negatives include no abdominal pain, bladder incontinence, bowel incontinence, chest pain, dysuria, fever, headaches, leg pain, numbness, paresis, paresthesias, pelvic pain, perianal numbness, tingling, weakness or weight loss. Risk factors include renal stones. She has tried nothing for the symptoms. The treatment provided no relief.   Review of Systems   Constitutional:  Negative for activity change, appetite change, chills, fatigue, fever and weight loss.   HENT:  Negative for congestion, ear pain, hearing loss, nosebleeds, sinus pressure, sore throat and trouble swallowing.    Eyes:  Negative for pain and visual disturbance.   Respiratory:  Negative for apnea, cough and shortness of breath.    Cardiovascular:  Negative for chest pain and leg swelling.   Gastrointestinal:  Negative for abdominal distention, abdominal pain, anal bleeding, blood in stool, bowel incontinence, constipation, diarrhea, nausea, rectal pain and vomiting.   Endocrine: Negative for cold intolerance,  heat intolerance, polydipsia, polyphagia and polyuria.   Genitourinary:  Positive for flank pain. Negative for bladder incontinence, decreased urine volume, difficulty urinating, dyspareunia, dysuria, enuresis, frequency, genital sores, hematuria, menstrual problem, pelvic pain, urgency, vaginal bleeding, vaginal discharge and vaginal pain.   Musculoskeletal:  Negative for arthralgias and back pain.   Skin:  Negative for color change, pallor and rash.   Allergic/Immunologic: Negative for environmental allergies, food allergies and immunocompromised state.   Neurological:  Negative for dizziness, tingling, speech difficulty, weakness, numbness, headaches and paresthesias.   Hematological:  Negative for adenopathy. Does not bruise/bleed easily.   Psychiatric/Behavioral: Negative.       Objective:      Physical Exam  Vitals and nursing note reviewed.   Constitutional:       Appearance: Normal appearance. She is well-developed.   HENT:      Head: Normocephalic.      Right Ear: External ear normal.      Left Ear: External ear normal.      Nose: Nose normal.   Eyes:      Conjunctiva/sclera: Conjunctivae normal.      Pupils: Pupils are equal, round, and reactive to light.   Cardiovascular:      Rate and Rhythm: Normal rate and regular rhythm.      Heart sounds: Normal heart sounds.   Pulmonary:      Effort: Pulmonary effort is normal.      Breath sounds: Normal breath sounds.   Abdominal:      General: Bowel sounds are normal.      Palpations: Abdomen is soft.   Musculoskeletal:         General: Normal range of motion.      Cervical back: Normal range of motion and neck supple.   Skin:     General: Skin is warm and dry.   Neurological:      Mental Status: She is alert and oriented to person, place, and time.      Deep Tendon Reflexes: Reflexes are normal and symmetric.   Psychiatric:         Behavior: Behavior normal.         Thought Content: Thought content normal.         Judgment: Judgment normal.       Assessment:        1. Abdominal pain, unspecified abdominal location    2. Bilateral flank pain    3. Calcium nephrolithiasis          Plan:       Patient Instructions   Obtain CT abdomen pelvis stone study.  Notify patient of results.  Scheduled treatment with extracorporeal shockwave lithotripsy if necessary.  If no stones are identified will consider seeing the spine doctor or physical therapy to see if we can relieve her of her discomfort.

## 2022-11-28 ENCOUNTER — PATIENT MESSAGE (OUTPATIENT)
Dept: UROLOGY | Facility: CLINIC | Age: 62
End: 2022-11-28
Payer: COMMERCIAL

## 2022-12-05 DIAGNOSIS — E11.69 HYPERLIPIDEMIA ASSOCIATED WITH TYPE 2 DIABETES MELLITUS: ICD-10-CM

## 2022-12-05 DIAGNOSIS — E78.5 HYPERLIPIDEMIA ASSOCIATED WITH TYPE 2 DIABETES MELLITUS: ICD-10-CM

## 2022-12-05 NOTE — TELEPHONE ENCOUNTER
Care Due:                  Date            Visit Type   Department     Provider  --------------------------------------------------------------------------------                                EP -                              PRIMARY      [facility]  Last Visit: 06-      [facility]  Next Visit: None Scheduled  None         None Found                                                            Last  Test          Frequency    Reason                     Performed    Due Date  --------------------------------------------------------------------------------    HBA1C.......  6 months...  SITagliptin, metFORMIN...  06- 12-    Health [facility] Embedded Care Gaps. Reference number: [id number]. 12/05/2022   5:31:19 AM CST

## 2022-12-06 RX ORDER — ROSUVASTATIN CALCIUM 10 MG/1
TABLET, COATED ORAL
Qty: 90 TABLET | Refills: 1 | Status: SHIPPED | OUTPATIENT
Start: 2022-12-06 | End: 2023-07-04

## 2022-12-06 NOTE — TELEPHONE ENCOUNTER
Refill Decision Note   Aminta Schreiber  is requesting a refill authorization.  Brief Assessment and Rationale for Refill:  Approve    -Medication-Related Problems Identified: Requires labs  Medication Therapy Plan:       Medication Reconciliation Completed: No   Comments:     Provider Staff:     Action is required for this patient.   Please see care gap opportunities below in Care Due Message.     Thanks!  [facility]     Appointments      Date Provider   Last Visit   6/27/2022 Verenice Chase DO   Next Visit   Visit date not found Verenice Chase DO     Note composed:8:10 AM 12/06/2022           Note composed:8:09 AM 12/06/2022

## 2023-01-02 ENCOUNTER — PATIENT MESSAGE (OUTPATIENT)
Dept: UROLOGY | Facility: CLINIC | Age: 63
End: 2023-01-02
Payer: COMMERCIAL

## 2023-01-03 RX ORDER — TAMSULOSIN HYDROCHLORIDE 0.4 MG/1
0.4 CAPSULE ORAL DAILY
Qty: 30 CAPSULE | Refills: 11 | Status: SHIPPED | OUTPATIENT
Start: 2023-01-03 | End: 2024-03-04 | Stop reason: SDUPTHER

## 2023-01-03 NOTE — TELEPHONE ENCOUNTER
Hi   I need a refill on above mention meds and can I get a 90 script please .  Sincerely   Aminta Schreiber

## 2023-01-04 ENCOUNTER — PATIENT OUTREACH (OUTPATIENT)
Dept: ADMINISTRATIVE | Facility: HOSPITAL | Age: 63
End: 2023-01-04
Payer: COMMERCIAL

## 2023-01-10 ENCOUNTER — PATIENT MESSAGE (OUTPATIENT)
Dept: UROLOGY | Facility: CLINIC | Age: 63
End: 2023-01-10
Payer: COMMERCIAL

## 2023-02-08 ENCOUNTER — TELEPHONE (OUTPATIENT)
Dept: PRIMARY CARE CLINIC | Facility: CLINIC | Age: 63
End: 2023-02-08
Payer: COMMERCIAL

## 2023-04-06 ENCOUNTER — TELEPHONE (OUTPATIENT)
Dept: FAMILY MEDICINE | Facility: CLINIC | Age: 63
End: 2023-04-06
Payer: COMMERCIAL

## 2023-04-06 DIAGNOSIS — E55.9 VITAMIN D DEFICIENCY: ICD-10-CM

## 2023-04-06 DIAGNOSIS — E11.59 HYPERTENSION ASSOCIATED WITH TYPE 2 DIABETES MELLITUS: ICD-10-CM

## 2023-04-06 DIAGNOSIS — I15.2 HYPERTENSION ASSOCIATED WITH TYPE 2 DIABETES MELLITUS: ICD-10-CM

## 2023-04-06 DIAGNOSIS — E78.5 HYPERLIPIDEMIA ASSOCIATED WITH TYPE 2 DIABETES MELLITUS: Primary | ICD-10-CM

## 2023-04-06 DIAGNOSIS — E11.69 HYPERLIPIDEMIA ASSOCIATED WITH TYPE 2 DIABETES MELLITUS: Primary | ICD-10-CM

## 2023-04-06 DIAGNOSIS — E11.9 TYPE 2 DIABETES MELLITUS WITHOUT COMPLICATION, WITHOUT LONG-TERM CURRENT USE OF INSULIN: ICD-10-CM

## 2023-04-06 RX ORDER — METFORMIN HYDROCHLORIDE 500 MG/1
1000 TABLET, EXTENDED RELEASE ORAL DAILY
Qty: 60 TABLET | Refills: 0 | Status: SHIPPED | OUTPATIENT
Start: 2023-04-06 | End: 2023-05-01

## 2023-04-06 RX ORDER — LOSARTAN POTASSIUM AND HYDROCHLOROTHIAZIDE 12.5; 5 MG/1; MG/1
1 TABLET ORAL 2 TIMES DAILY
Qty: 60 TABLET | Refills: 0 | Status: SHIPPED | OUTPATIENT
Start: 2023-04-06 | End: 2023-04-24 | Stop reason: SDUPTHER

## 2023-04-06 NOTE — TELEPHONE ENCOUNTER
Medication refilled.  Please make sure labs are scheduled prior to visit 04/24/2023. [facility]    Orders Placed This Encounter    CBC Without Differential    Comprehensive Metabolic Panel    Lipid Panel    Microalbumin/Creatinine Ratio, Urine    Hemoglobin A1C    metFORMIN (GLUCOPHAGE-XR) 500 MG ER 24hr tablet    losartan-hydrochlorothiazide 50-12.5 mg (HYZAAR) 50-12.5 mg per tablet     Dr. Verenice Chase D.O.   [facility]

## 2023-04-06 NOTE — TELEPHONE ENCOUNTER
Care Due:                  Date            Visit Type   Department     Provider  --------------------------------------------------------------------------------                                EP                               PRIMARY      [facility]  Last Visit: 06-      [facility]                              FOLLOWUP/OF  [facility] PRIMARY  Next Visit: 04-      [facility] VISIT   CARE           Verenice Chase                                                            Last  Test          Frequency    Reason                     Performed    Due Date  --------------------------------------------------------------------------------    CMP.........  12 months..  SITagliptin,               03- 03-                             losartan-hydrochlorothiaz                             harrison, metFORMIN,                             rosuvastatin.............    HBA1C.......  6 months...  SITagliptin, metFORMIN...  06- 12-    Lipid Panel.  12 months..  rosuvastatin.............  03- 03-    Health [facility] Embedded Care Gaps. Reference number: [id number]. 4/06/2023   12:49:35 PM CDT

## 2023-04-08 ENCOUNTER — PATIENT MESSAGE (OUTPATIENT)
Dept: PRIMARY CARE CLINIC | Facility: CLINIC | Age: 63
End: 2023-04-08
Payer: COMMERCIAL

## 2023-04-08 ENCOUNTER — PATIENT MESSAGE (OUTPATIENT)
Dept: UROLOGY | Facility: CLINIC | Age: 63
End: 2023-04-08
Payer: COMMERCIAL

## 2023-04-08 DIAGNOSIS — R39.15 URINARY URGENCY: ICD-10-CM

## 2023-04-08 DIAGNOSIS — N39.41 URGE URINARY INCONTINENCE: ICD-10-CM

## 2023-04-08 DIAGNOSIS — K21.9 GASTROESOPHAGEAL REFLUX DISEASE WITHOUT ESOPHAGITIS: ICD-10-CM

## 2023-04-08 DIAGNOSIS — R35.1 NOCTURIA: ICD-10-CM

## 2023-04-08 DIAGNOSIS — N32.81 OVERACTIVE BLADDER: ICD-10-CM

## 2023-04-08 DIAGNOSIS — R35.0 URINARY FREQUENCY: ICD-10-CM

## 2023-04-10 RX ORDER — MIRABEGRON 50 MG/1
50 TABLET, FILM COATED, EXTENDED RELEASE ORAL DAILY
Qty: 30 TABLET | Refills: 11 | Status: SHIPPED | OUTPATIENT
Start: 2023-04-10 | End: 2024-03-28

## 2023-04-10 RX ORDER — OMEPRAZOLE 40 MG/1
40 CAPSULE, DELAYED RELEASE ORAL EVERY MORNING
Qty: 90 CAPSULE | Refills: 0 | Status: SHIPPED | OUTPATIENT
Start: 2023-04-10 | End: 2023-05-01 | Stop reason: SDUPTHER

## 2023-04-10 NOTE — TELEPHONE ENCOUNTER
No new care gaps identified.  [facility] Embedded Care Gaps. Reference number: [id number]. 4/10/2023   10:50:45 AM CDT

## 2023-04-10 NOTE — TELEPHONE ENCOUNTER
Refill Encounter    PCP Visits: Recent Visits  Date Type Provider Dept   06/27/22 Office Visit Verenice Chase DO [facility]   Showing recent visits within past 360 days and meeting all other requirements  Future Appointments  Date Type Provider Dept   04/24/23 Appointment Verenice Chase DO [facility]   Showing future appointments within next 720 days and meeting all other requirements     Last 3 Blood Pressure:   BP Readings from Last 3 Encounters:   11/22/22 124/72   06/27/22 106/72   06/07/22 (!) 101/50     Preferred Pharmacy:   [place], [place] - [address]  [address] [address]  Phone: [phone] Fax: [phone]    [facility]  [address]  [place] [address]  Phone: [phone] Fax: [phone]    [facility] [place] - [address]  [address]  [place] [address]  Phone: [phone] Fax: [phone]    Requested RX:  Requested Prescriptions     Pending Prescriptions Disp Refills    omeprazole (PRILOSEC) 40 MG capsule 90 capsule 3     Sig: Take 1 capsule (40 mg total) by mouth every morning.      RX Route: Normal

## 2023-04-19 PROBLEM — L20.9 ATOPIC DERMATITIS: Status: ACTIVE | Noted: 2022-10-11

## 2023-04-19 PROBLEM — I10 HYPERTENSIVE DISORDER: Status: ACTIVE | Noted: 2022-10-11

## 2023-04-19 RX ORDER — ZOSTER VACCINE RECOMBINANT, ADJUVANTED 50 MCG/0.5
KIT INTRAMUSCULAR
Qty: 1 EACH | Refills: 1 | Status: CANCELLED | OUTPATIENT
Start: 2023-04-19

## 2023-04-19 RX ORDER — CLOTRIMAZOLE AND BETAMETHASONE DIPROPIONATE 10; .64 MG/G; MG/G
1 CREAM TOPICAL 2 TIMES DAILY
COMMUNITY
Start: 2023-03-31 | End: 2024-03-26

## 2023-04-19 RX ORDER — SEMAGLUTIDE 0.68 MG/ML
0.5 INJECTION, SOLUTION SUBCUTANEOUS
Status: CANCELLED | OUTPATIENT
Start: 2023-04-19

## 2023-04-19 NOTE — PROGRESS NOTES
"FAMILY MEDICINE  [facility]    Reason for visit:   Chief Complaint   Patient presents with    Diabetes    Hypertension         SUBJECTIVE: Aminta Schreiber is a 62 y.o. female  - smoker (started 14 yo 1/2 pack per day= 48 years) obesity, type 2 diabetes, hypertension, hyperlipidemia, Raynaud's, GERD, myelolipoma bilateral adrenal glands, chronic low back pain, history of renal stones, fasciitis with bone spurs and overactive bladder presents to follow-up diabetes, hypertension and labs      Urology Brogle (nephrolithiasis)  Rheumatology: Dr. Live Lacey [facility]  Endocrine: Dr. Scott Arevalo (monitor adrenals and no issues)  Gynecology Dr. Vero Sanz  Ophthalmology: Dr. Mallorie Garcia.   GI: [facility]. She cancelled her 5/2022 colonoscopy appt and will reschedule  Ortho Dr. Escamilla    She presents with her .They are staying a camper outside of their house since 11/2021.  Hopeful that they will be back in her house in 1 month.    She was previously following with Pulmonary for pulmonary nodule however her pulmonologist is no longer at the practice.  Her last CT scan was 03/2022 "Probably Benign- 6 month LDCT.  The right middle lobe, solid, peripheral nodule (11.7 x 3.6 mm) with average diameter of 7.65 mm requires additional evaluation.  Recommend six-month follow-up CT following initial detection.  Therefore next follow-up CT should be performed circa June-July 2023."At that time pulmonary ordered a six-month follow-up CT scan however she did has not had it done yet       1. Diabetes Type 2     Age diagnosed: 50's     Current treatment regimen:   metFORMIN (GLUCOPHAGE-XR) 500 MG ER 24hr tablet, Take 2 tablets (1,000 mg total) by mouth once daily., Disp: 60 tablet,  SITagliptin phosphate (JANUVIA) 25 MG Tab, Take 1 tablet (25 mg total) by mouth once daily., Disp: 90 tablet, Rfl: 3     Side effects from treatment: denies  Complications of diabetes: none     Glucometer: yes  Glucose monitoring: " fasting daily or s/p dinner  A. Fastin-140 mg/dl    Lab Results       Component                Value               Date                       HGBA1C                   7.0 (H)             2023              Lab Results       Component                Value               Date                       HGBA1C                   6.5 (H)             2022              Lab Results       Component                Value               Date                       HGBA1C                   7.3 (H)             2022                      Low dose statin: yes  Last eye exam: Dr. Boyer 22  Last foot exam:  due today 23     Vaccines:   Influenza: recommend yearly  Pneumovax: 23 2020  Prevnar 20: declined  COVID-19:  booster due      2. Hypertension  - DM2, HLD  - BP goal < 140/90     Current medication treatment:   losartan-hydrochlorothiazide 50-12.5 mg (HYZAAR) 50-12.5 mg per tablet, Take 1 tablet by mouth 2 (two) times a day., Disp: 180 tablet, Rfl: 3    Prior:   losartan-hydrochlorothiazide 100-25 mg (HYZAAR) 100-25 mg per tablet, Take 1 tablet by mouth once daily  - she did not like it     Medication side effects: denies     3. Hyperlipidemia  - diabetes, hypertension and hyperlipidemia  - LDL goal < 100    Current treatment:  rosuvastatin (CRESTOR) 10 MG tablet, Take 1 tablet (10 mg total) by mouth every evening., Disp: 90 tablet, Rfl: 2    Side effects from current treatment: none    Lab Results       Component                Value               Date                       CHOL                     137                 2023                 CHOL                     129                 2022                 CHOL                     131                 2021            Lab Results       Component                Value               Date                       HDL                      48                  2023                 HDL                      45                  2022                  HDL                      44                  09/22/2021            Lab Results       Component                Value               Date                       LDLCALC                  78.4                04/17/2023                 LDLCALC                  75.0                03/25/2022                 LDLCALC                  74.0                09/22/2021            No results found for: DLDL  Lab Results       Component                Value               Date                       TRIG                     53                  04/17/2023                 TRIG                     45                  03/25/2022                 TRIG                     65                  09/22/2021              f1 Lab Results       Component                Value               Date                       CHOLHDL                  35.0                04/17/2023                 CHOLHDL                  34.9                03/25/2022                 CHOLHDL                  33.6                09/22/2021                  Review of Systems   HENT:  Negative for hearing loss.    Eyes:  Negative for discharge.   Respiratory:  Negative for wheezing.    Cardiovascular:  Negative for chest pain and palpitations.   Gastrointestinal:  Negative for blood in stool, constipation, diarrhea and vomiting.   Genitourinary:  Negative for dysuria and hematuria.   Musculoskeletal:  Negative for neck pain.   Neurological:  Negative for weakness and headaches.   Endo/Heme/Allergies:  Negative for polydipsia.   All other systems reviewed and are negative.    HEALTH MAINTENANCE:   Health Maintenance   Topic Date Due    TETANUS VACCINE  Never done    LDCT Lung Screen  03/15/2023    Eye Exam  07/18/2023    Mammogram  09/12/2023    Hemoglobin A1c  10/17/2023    Lipid Panel  04/17/2024    Low Dose Statin  04/24/2024    Foot Exam  04/24/2024    Hepatitis C Screening  Completed     Health Maintenance Topics with due status: Not Due       Topic Last Completion  Date    Eye Exam 07/18/2022    Mammogram 09/12/2022    Diabetes Urine Screening 04/17/2023    Lipid Panel 04/17/2023    Hemoglobin A1c 04/17/2023    Low Dose Statin 04/24/2023    Foot Exam 04/24/2023     Health Maintenance Due   Topic Date Due    TETANUS VACCINE  Never done    Shingles Vaccine (1 of 2) Never done    Pneumococcal Vaccines (Age 0-64) (2 - PCV) 07/20/2021    COVID-19 Vaccine (5 - Booster for Moderna series) 01/24/2022    Colorectal Cancer Screening  04/14/2022    LDCT Lung Screen  03/15/2023       HISTORY:   Past Medical History:   Diagnosis Date    Diabetes mellitus, type 2     GERD (gastroesophageal reflux disease)     Gross hematuria 04/30/2019    H/O left breast biopsy     Hyperlipidemia     Hypertension     Kidney stone     Migraines     Nephrolithiasis 07/24/2018    Personal history of colonic polyps 01/15/2014    Raynaud's disease     Renal calculus, right 11/23/2015    Renal colic on right side 11/23/2015    Right ureteral calculus 04/30/2019    Urinary tract infection     Vaginal infection        Past Surgical History:   Procedure Laterality Date    BREAST CYST EXCISION      EXTRACORPOREAL SHOCK WAVE LITHOTRIPSY Right 08/09/2018    Procedure: LITHOTRIPSY, ESWL;  Surgeon: Milton Garland MD;  Location: [facility] OR;  Service: Urology;  Laterality: Right;    EXTRACORPOREAL SHOCK WAVE LITHOTRIPSY Right 05/02/2019    Procedure: LITHOTRIPSY, ESWL;  Surgeon: Milton Garland MD;  Location: [facility] OR;  Service: Urology;  Laterality: Right;    EXTRACORPOREAL SHOCK WAVE LITHOTRIPSY Right 07/25/2019    Procedure: LITHOTRIPSY, ESWL;  Surgeon: Milton Garland MD;  Location: [facility] OR;  Service: Urology;  Laterality: Right;    HYSTERECTOMY      Partial    LIPOMA RESECTION Right 06/07/2022    Procedure: EXCISION, LIPOMA;  Surgeon: Odell Escamilla Jr., MD;  Location: [facility] OR;  Service: Orthopedics;  Laterality: Right;  include nerve exploration    LITHOTRIPSY      x 3 or 4 per pt       Family History   Problem  "Relation Age of Onset    Diabetes Mother     Hypertension Mother     Cancer Father     Hypertension Father     No Known Problems Brother     Migraines Daughter     No Known Problems Son     Hypertension Son     Cancer Son     Early death Paternal Grandmother     Hypertension Maternal Aunt     Kidney disease Neg Hx        Social History     Tobacco Use    Smoking status: Every Day     Packs/day: 0.50     Years: 50.00     Pack years: 25.00     Types: Cigarettes     Start date: 4/1/1973     Passive exposure: Current    Smokeless tobacco: Never    Tobacco comments:     Still smoking   Substance Use Topics    Alcohol use: Yes     Alcohol/week: 1.0 standard drink     Types: 1 Standard drinks or equivalent per week     Comment: 'on occasion"    Drug use: Never       Social History     Social History Narrative    .  Three children.  Works at [employer]       ALLERGIES:   Review of patient's allergies indicates:  No Known Allergies    MEDS:   Outpatient Medications as of 4/24/2023   Medication Sig Dispense Refill    ammonium lactate 12 % Crea Apply 1 application topically 2 (two) times daily. 140 g 2    amoxicillin-clavulanate 875-125mg (AUGMENTIN) 875-125 mg per tablet Take 1 tablet by mouth 2 (two) times daily.      ascorbic acid, vitamin C, (VITAMIN C) 1000 MG tablet Take 1,000 mg by mouth once daily.      cholecalciferol, vitamin D3, (VITAMIN D3) 50 mcg (2,000 unit) Cap Take 1 capsule (2,000 Units total) by mouth once daily. 90 capsule 2    ciclopirox (PENLAC) 8 % Soln Apply topically nightly. 6.6 mL 11    clotrimazole-betamethasone 1-0.05% (LOTRISONE) cream Apply 1 g topically 2 (two) times daily. apply to affected area      co-enzyme Q-10 30 mg capsule Take 30 mg by mouth once daily.      cyanocobalamin (VITAMIN B-12) 1000 MCG tablet Take 100 mcg by mouth once daily.      fluticasone propionate (FLONASE) 50 mcg/actuation nasal spray 1 spray (50 mcg total) by Each Nostril route once daily. 16 g 0    folic acid " "(FOLVITE) 1 MG tablet Take 1 tablet (1,000 mcg total) by mouth once daily. 90 tablet 3    ketorolac (TORADOL) 10 mg tablet Take 10 mg by mouth every 6 (six) hours.      metFORMIN (GLUCOPHAGE-XR) 500 MG ER 24hr tablet Take 2 tablets (1,000 mg total) by mouth once daily. 60 tablet 0    mirabegron (MYRBETRIQ) 50 mg Tb24 Take 1 tablet (50 mg total) by mouth once daily. 30 tablet 11    omeprazole (PRILOSEC) 40 MG capsule Take 1 capsule (40 mg total) by mouth every morning. 90 capsule 0    rosuvastatin (CRESTOR) 10 MG tablet TAKE 1 TABLET BY MOUTH ONCE DAILY IN THE EVENING 90 tablet 1    SITagliptin phosphate (JANUVIA) 25 MG Tab Januvia 25 mg tablet   Take 1 tablet every day by oral route for 90 days.      tamsulosin (FLOMAX) 0.4 mg Cap Take 1 capsule (0.4 mg total) by mouth once daily. 30 capsule 11    triamcinolone acetonide 0.1% (KENALOG) 0.1 % cream triamcinolone acetonide 0.1 % topical cream      triamcinolone acetonide 0.1% (KENALOG) 0.1 % cream SMARTSI Application Topical 2-3 Times Daily      valACYclovir (VALTREX) 1000 MG tablet Take 1,000 mg by mouth once daily.       valACYclovir (VALTREX) 1000 MG tablet Take 1 tablet by mouth once daily.      losartan-hydrochlorothiazide 50-12.5 mg (HYZAAR) 50-12.5 mg per tablet Take 1 tablet by mouth 2 (two) times a day. 180 tablet 3     No current facility-administered medications on file as of 2023.       Vital signs:   Vitals:    23 0934 23 1046   BP: (!) 152/80 136/80   Pulse: 87    Resp: (!) 99    Weight: 136 kg (299 lb 13.2 oz)    Height: 5' 5" (1.651 m)      Body mass index is 49.89 kg/m².    PHYSICAL EXAM:     Physical Exam  Vitals reviewed.   Constitutional:       General: She is not in acute distress.  HENT:      Head: Normocephalic and atraumatic.      Right Ear: Tympanic membrane and ear canal normal.      Left Ear: Tympanic membrane and ear canal normal.   Eyes:      General: No scleral icterus.     Conjunctiva/sclera: Conjunctivae normal. "   Neck:      Thyroid: No thyromegaly.      Vascular: No carotid bruit.   Cardiovascular:      Rate and Rhythm: Normal rate and regular rhythm.      Pulses:           Dorsalis pedis pulses are 2+ on the right side and 2+ on the left side.        Posterior tibial pulses are 2+ on the right side and 2+ on the left side.      Heart sounds: Normal heart sounds. No murmur heard.    No friction rub. No gallop.   Pulmonary:      Effort: Pulmonary effort is normal.      Breath sounds: Normal breath sounds. No wheezing or rales.   Abdominal:      General: Bowel sounds are normal. There is no distension.      Palpations: Abdomen is soft.      Tenderness: There is no abdominal tenderness.   Musculoskeletal:      Cervical back: Normal range of motion and neck supple.      Right lower leg: Edema (trace) present.      Left lower leg: Edema (trace) present.   Feet:      Right foot:      Protective Sensation: 5 sites tested.  5 sites sensed.      Skin integrity: Skin integrity normal.      Left foot:      Protective Sensation: 5 sites tested.  5 sites sensed.      Skin integrity: Skin integrity normal.   Lymphadenopathy:      Cervical: No cervical adenopathy.   Skin:     General: Skin is warm.      Capillary Refill: Capillary refill takes less than 2 seconds.   Neurological:      Mental Status: She is alert.           PERTINENT RESULTS:   Lab Visit on 04/17/2023   Component Date Value Ref Range Status    Microalbumin, Urine 04/17/2023 5.0  ug/mL Final    Creatinine, Urine 04/17/2023 183.0  15.0 - 325.0 mg/dL Final    Microalb/Creat Ratio 04/17/2023 2.7  0.0 - 30.0 ug/mg Final   Lab Visit on 04/17/2023   Component Date Value Ref Range Status    WBC 04/17/2023 6.37  3.90 - 12.70 K/uL Final    RBC 04/17/2023 4.13  4.00 - 5.40 M/uL Final    Hemoglobin 04/17/2023 11.8 (L)  12.0 - 16.0 g/dL Final    Hematocrit 04/17/2023 37.1  37.0 - 48.5 % Final    MCV 04/17/2023 90  82 - 98 fL Final    MCH 04/17/2023 28.6  27.0 - 31.0 pg Final    MCHC  04/17/2023 31.8 (L)  32.0 - 36.0 g/dL Final    RDW 04/17/2023 13.3  11.5 - 14.5 % Final    Platelets 04/17/2023 284  150 - 450 K/uL Final    MPV 04/17/2023 10.1  9.2 - 12.9 fL Final    Sodium 04/17/2023 140  136 - 145 mmol/L Final    Potassium 04/17/2023 3.5  3.5 - 5.1 mmol/L Final    Chloride 04/17/2023 101  95 - 110 mmol/L Final    CO2 04/17/2023 35 (H)  23 - 29 mmol/L Final    Glucose 04/17/2023 137 (H)  70 - 110 mg/dL Final    BUN 04/17/2023 12  7 - 17 mg/dL Final    Creatinine 04/17/2023 0.74  0.50 - 1.40 mg/dL Final    Calcium 04/17/2023 9.0  8.7 - 10.5 mg/dL Final    Total Protein 04/17/2023 7.3  6.0 - 8.4 g/dL Final    Albumin 04/17/2023 4.0  3.5 - 5.2 g/dL Final    Total Bilirubin 04/17/2023 0.3  0.1 - 1.0 mg/dL Final    Comment: For infants and newborns, interpretation of results should be based  on gestational age, weight and in agreement with clinical  observations.    Premature Infant recommended reference ranges:  Up to 24 hours.............<8.0 mg/dL  Up to 48 hours............<12.0 mg/dL  3-5 days..................<15.0 mg/dL  6-29 days.................<15.0 mg/dL      Alkaline Phosphatase 04/17/2023 99  38 - 126 U/L Final    AST 04/17/2023 21  15 - 46 U/L Final    ALT 04/17/2023 23  10 - 44 U/L Final    Anion Gap 04/17/2023 4 (L)  8 - 16 mmol/L Final    eGFR 04/17/2023 >60.0  >60 mL/min/1.73 m^2 Final    Cholesterol 04/17/2023 137  120 - 199 mg/dL Final    Comment: The National Cholesterol Education Program (NCEP) has set the  following guidelines (reference ranges) for Cholesterol:  Optimal.....................<200 mg/dL  Borderline High.............200-239 mg/dL  High........................> or = 240 mg/dL      Triglycerides 04/17/2023 53  30 - 150 mg/dL Final    Comment: The National Cholesterol Education Program (NCEP) has set the  following guidelines (reference values) for triglycerides:  Normal......................<150 mg/dL  Borderline High.............150-199  mg/dL  High........................200-499 mg/dL      HDL 04/17/2023 48  40 - 75 mg/dL Final    Comment: The National Cholesterol Education Program (NCEP) has set the  following guidelines (reference values) for HDL Cholesterol:  Low...............<40 mg/dL  Optimal...........>60 mg/dL      LDL Cholesterol 04/17/2023 78.4  63.0 - 159.0 mg/dL Final    Comment: The National Cholesterol Education Program (NCEP) has set the  following guidelines (reference values) for LDL Cholesterol:  Optimal.......................<130 mg/dL  Borderline High...............130-159 mg/dL  High..........................160-189 mg/dL  Very High.....................>190 mg/dL      HDL/Cholesterol Ratio 04/17/2023 35.0  20.0 - 50.0 % Final    Total Cholesterol/HDL Ratio 04/17/2023 2.9  2.0 - 5.0 Final    Non-HDL Cholesterol 04/17/2023 89  mg/dL Final    Comment: Risk category and Non-HDL cholesterol goals:  Coronary heart disease (CHD)or equivalent (10-year risk of CHD >20%):  Non-HDL cholesterol goal     <130 mg/dL  Two or more CHD risk factors and 10-year risk of CHD <= 20%:  Non-HDL cholesterol goal     <160 mg/dL  0 to 1 CHD risk factor:  Non-HDL cholesterol goal     <190 mg/dL      Hemoglobin A1C 04/17/2023 7.0 (H)  4.0 - 5.6 % Final    Comment: ADA Screening Guidelines:  5.7-6.4%  Consistent with prediabetes  >or=6.5%  Consistent with diabetes    High levels of fetal hemoglobin interfere with the HbA1C  assay. Heterozygous hemoglobin variants (HbS, HgC, etc)do  not significantly interfere with this assay.   However, presence of multiple variants may affect accuracy.      Estimated Avg Glucose 04/17/2023 154 (H)  68 - 131 mg/dL Final       [phone] 3/15/2022 Routine     Narrative & Impression  EXAMINATION:  CT LOW DOSE CHEST DIAGNOSTIC     CLINICAL HISTORY:  Lung nodule, > 8mm; Solitary pulmonary nodule     TECHNIQUE:  CT of the thorax was performed with low dose, lung screening protocol.  No contrast was administered.  Sagittal and  coronal reconstructions were obtained.     COMPARISON:  CT of the chest lung cancer screening 12/14/2021     FINDINGS:  Lungs:     The 11.7 x 3.6 mm nodular opacity with a broad place of pleural contact in the lateral aspect of the right middle lobe is stable in size.     No new or enlarging pulmonary nodules.     A few other scattered micro nodules do not appear changed.     Pleura:   No effusion..     Heart, mediastinum, pericardium: No new abnormalities.  The heart is normal in size.  There is mild calcific disease of the aorta.  Thyroid gland, trachea, and esophagus demonstrate no new abnormalities.  There is a small, hiatal hernia.     Lymph nodes: None abnormally enlarged.     Chest wall and skeletal structures: Unremarkable except age-appropriate degenerative changes.  No new bone lesions.     Upper abdomen: Bilateral, adrenal fat attenuation masses the largest up to 5 cm in the left side.  Stable 10 mm hepatic low-density lesion.     Impression:     Lung-RADS Category:  3 - Probably Benign- 6 month LDCT.  The right middle lobe, solid, peripheral nodule (11.7 x 3.6 mm) with average diameter of 7.65 mm requires additional evaluation.  Recommend six-month follow-up CT following initial detection.  Therefore next follow-up CT should be performed circa June-July 2023.     Clinically or potentially clinically significant non lung cancer finding:  None.     Prior Lung Cancer Modifier:  No history of prior lung cancer.        Electronically signed by: Patricia Chavira  Date:                                            03/15/2022  Time:                                           14:01           Exam Ended: 03/15/22 08:43 Last Resulted: 03/15/22 14:01              ASSESSMENT/PLAN:    1. Type 2 diabetes mellitus without complication, without long-term current use of insulin  Overview:  Lab Results   Component Value Date    HGBA1C 6.5 (H) 06/24/2022     - well controlled  - continue current management plan   - patient  encouraged to notify me with any changes    Orders:  -     Cancel: Comprehensive Metabolic Panel; Future; Expected date: 07/19/2023  -     Cancel: Hemoglobin A1C; Future; Expected date: 07/19/2023  -     Hemoglobin A1C; Future; Expected date: 07/19/2023  -     Comprehensive Metabolic Panel; Future; Expected date: 07/19/2023    2. Hyperlipidemia associated with type 2 diabetes mellitus  Overview:  Lab Results   Component Value Date    LDLCALC 78.4 04/17/2023     - well controlled  - continue current medication      3. Hypertension associated with type 2 diabetes mellitus  Overview:  - well controlled  - continue current medications  - counseling on taking medication at the same time BID    Orders:  -     losartan-hydrochlorothiazide 50-12.5 mg (HYZAAR) 50-12.5 mg per tablet; Take 1 tablet by mouth 2 (two) times a day.  Dispense: 180 tablet; Refill: 3    4. Pulmonary nodule 1 cm or greater in diameter  Overview:  - 12/14/21 LDCT: 12 mm pleural base nodule versus noncalcified pleural plaque right middle lobe.  Lung-RADS Category:  4A - Suspicious - consultation advised - possible next steps 3 month LDCT -in some scenarios PET/CT  - 3/15/22 LDCT: Lung-RADS Category:  3 - Probably Benign- 6 month LDCT.  The right middle lobe, solid, peripheral nodule (11.7 x 3.6 mm) with average diameter of 7.65 mm requires additional evaluation.  Recommend six-month follow-up CT following initial detection.  - pulmonary ordered 6 month CT but she has not had done and NP Hawa no longer available  - discussed with Aminta Schreiber and CT ordered      Orders:  -     CT Chest Without Contrast; Future; Expected date: 04/24/2023    5. Class 3 severe obesity due to excess calories with serious comorbidity and body mass index (BMI) of 45.0 to 49.9 in adult  Overview:  - discussed recommendation for diet and cardiovascular exercise  - counseling on lifestyle modifications for risk factor reduction        6. Nicotine dependence, cigarettes,  uncomplicated  Overview:  - restarted smoking  - recommend quit smoking  - pt readiness 6/10  - discussed smoking cessation program available and pt declined  - counseling regarding recommendations for LDCT yearly for lung cancer screening.              7. Encounter for screening mammogram for breast cancer  -     Mammo Digital Screening Bilat w/ Uz[facility]; Future; Expected date: 09/19/2023    8. Screen for colon cancer  -     Ambulatory referral/consult to Gastroenterology; Future; Expected date: 04/26/2023          ORDERS:   Orders Placed This Encounter    CT Chest Without Contrast    Mammo Digital Screening Bilat w/ Uzair    Hemoglobin A1C    Comprehensive Metabolic Panel    Ambulatory referral/consult to Gastroenterology    losartan-hydrochlorothiazide 50-12.5 mg (HYZAAR) 50-12.5 mg per tablet       Vaccines recommended: Tdap, shingles, PCV 20, covid-19 booster. She declined    Follow-up in 3 months with labs or sooner if any concerns.      This note is dictated using the M*Modal Fluency Direct word recognition program. There are word recognition mistakes that are occasionally missed on review.    Dr. Verenice Chase D.O.   Family Medicine

## 2023-04-24 ENCOUNTER — OFFICE VISIT (OUTPATIENT)
Dept: PRIMARY CARE CLINIC | Facility: CLINIC | Age: 63
End: 2023-04-24
Attending: FAMILY MEDICINE
Payer: COMMERCIAL

## 2023-04-24 VITALS
WEIGHT: 293 LBS | SYSTOLIC BLOOD PRESSURE: 136 MMHG | HEART RATE: 87 BPM | DIASTOLIC BLOOD PRESSURE: 80 MMHG | RESPIRATION RATE: 99 BRPM | HEIGHT: 65 IN | BODY MASS INDEX: 48.82 KG/M2

## 2023-04-24 DIAGNOSIS — E11.9 TYPE 2 DIABETES MELLITUS WITHOUT COMPLICATION, WITHOUT LONG-TERM CURRENT USE OF INSULIN: Primary | ICD-10-CM

## 2023-04-24 DIAGNOSIS — R91.1 PULMONARY NODULE 1 CM OR GREATER IN DIAMETER: ICD-10-CM

## 2023-04-24 DIAGNOSIS — E78.5 HYPERLIPIDEMIA ASSOCIATED WITH TYPE 2 DIABETES MELLITUS: ICD-10-CM

## 2023-04-24 DIAGNOSIS — I15.2 HYPERTENSION ASSOCIATED WITH TYPE 2 DIABETES MELLITUS: ICD-10-CM

## 2023-04-24 DIAGNOSIS — E66.01 CLASS 3 SEVERE OBESITY DUE TO EXCESS CALORIES WITH SERIOUS COMORBIDITY AND BODY MASS INDEX (BMI) OF 45.0 TO 49.9 IN ADULT: ICD-10-CM

## 2023-04-24 DIAGNOSIS — Z12.31 ENCOUNTER FOR SCREENING MAMMOGRAM FOR BREAST CANCER: ICD-10-CM

## 2023-04-24 DIAGNOSIS — Z12.11 SCREEN FOR COLON CANCER: ICD-10-CM

## 2023-04-24 DIAGNOSIS — E11.59 HYPERTENSION ASSOCIATED WITH TYPE 2 DIABETES MELLITUS: ICD-10-CM

## 2023-04-24 DIAGNOSIS — E11.69 HYPERLIPIDEMIA ASSOCIATED WITH TYPE 2 DIABETES MELLITUS: ICD-10-CM

## 2023-04-24 DIAGNOSIS — F17.210 NICOTINE DEPENDENCE, CIGARETTES, UNCOMPLICATED: ICD-10-CM

## 2023-04-24 PROBLEM — M25.649 DECREASED RANGE OF MOTION OF FINGER: Status: RESOLVED | Noted: 2021-12-29 | Resolved: 2023-04-24

## 2023-04-24 PROBLEM — R53.1 WEAKNESS: Status: RESOLVED | Noted: 2022-01-05 | Resolved: 2023-04-24

## 2023-04-24 PROCEDURE — 99214 PR OFFICE/OUTPT VISIT, EST, LEVL IV, 30-39 MIN: ICD-10-PCS | Mod: S$GLB,,, | Performed by: FAMILY MEDICINE

## 2023-04-24 PROCEDURE — 3075F PR MOST RECENT SYSTOLIC BLOOD PRESS GE 130-139MM HG: ICD-10-PCS | Mod: CPTII,S$GLB,, | Performed by: FAMILY MEDICINE

## 2023-04-24 PROCEDURE — 3061F NEG MICROALBUMINURIA REV: CPT | Mod: CPTII,S$GLB,, | Performed by: FAMILY MEDICINE

## 2023-04-24 PROCEDURE — 3066F NEPHROPATHY DOC TX: CPT | Mod: CPTII,S$GLB,, | Performed by: FAMILY MEDICINE

## 2023-04-24 PROCEDURE — 3079F PR MOST RECENT DIASTOLIC BLOOD PRESSURE 80-89 MM HG: ICD-10-PCS | Mod: CPTII,S$GLB,, | Performed by: FAMILY MEDICINE

## 2023-04-24 PROCEDURE — 99999 PR PBB SHADOW E&M-EST. PATIENT-LVL V: ICD-10-PCS | Mod: PBBFAC,,, | Performed by: FAMILY MEDICINE

## 2023-04-24 PROCEDURE — 3075F SYST BP GE 130 - 139MM HG: CPT | Mod: CPTII,S$GLB,, | Performed by: FAMILY MEDICINE

## 2023-04-24 PROCEDURE — 1159F MED LIST DOCD IN RCRD: CPT | Mod: CPTII,S$GLB,, | Performed by: FAMILY MEDICINE

## 2023-04-24 PROCEDURE — 3051F HG A1C>EQUAL 7.0%<8.0%: CPT | Mod: CPTII,S$GLB,, | Performed by: FAMILY MEDICINE

## 2023-04-24 PROCEDURE — 1160F PR REVIEW ALL MEDS BY PRESCRIBER/CLIN PHARMACIST DOCUMENTED: ICD-10-PCS | Mod: CPTII,S$GLB,, | Performed by: FAMILY MEDICINE

## 2023-04-24 PROCEDURE — 99999 PR PBB SHADOW E&M-EST. PATIENT-LVL V: CPT | Mod: PBBFAC,,, | Performed by: FAMILY MEDICINE

## 2023-04-24 PROCEDURE — 3008F PR BODY MASS INDEX (BMI) DOCUMENTED: ICD-10-PCS | Mod: CPTII,S$GLB,, | Performed by: FAMILY MEDICINE

## 2023-04-24 PROCEDURE — 1160F RVW MEDS BY RX/DR IN RCRD: CPT | Mod: CPTII,S$GLB,, | Performed by: FAMILY MEDICINE

## 2023-04-24 PROCEDURE — 3079F DIAST BP 80-89 MM HG: CPT | Mod: CPTII,S$GLB,, | Performed by: FAMILY MEDICINE

## 2023-04-24 PROCEDURE — 3051F PR MOST RECENT HEMOGLOBIN A1C LEVEL 7.0 - < 8.0%: ICD-10-PCS | Mod: CPTII,S$GLB,, | Performed by: FAMILY MEDICINE

## 2023-04-24 PROCEDURE — 3066F PR DOCUMENTATION OF TREATMENT FOR NEPHROPATHY: ICD-10-PCS | Mod: CPTII,S$GLB,, | Performed by: FAMILY MEDICINE

## 2023-04-24 PROCEDURE — 3061F PR NEG MICROALBUMINURIA RESULT DOCUMENTED/REVIEW: ICD-10-PCS | Mod: CPTII,S$GLB,, | Performed by: FAMILY MEDICINE

## 2023-04-24 PROCEDURE — 3008F BODY MASS INDEX DOCD: CPT | Mod: CPTII,S$GLB,, | Performed by: FAMILY MEDICINE

## 2023-04-24 PROCEDURE — 99214 OFFICE O/P EST MOD 30 MIN: CPT | Mod: S$GLB,,, | Performed by: FAMILY MEDICINE

## 2023-04-24 PROCEDURE — 1159F PR MEDICATION LIST DOCUMENTED IN MEDICAL RECORD: ICD-10-PCS | Mod: CPTII,S$GLB,, | Performed by: FAMILY MEDICINE

## 2023-04-24 RX ORDER — LOSARTAN POTASSIUM AND HYDROCHLOROTHIAZIDE 12.5; 5 MG/1; MG/1
1 TABLET ORAL 2 TIMES DAILY
Qty: 180 TABLET | Refills: 3 | Status: SHIPPED | OUTPATIENT
Start: 2023-04-24 | End: 2024-04-23

## 2023-04-28 ENCOUNTER — DOCUMENTATION ONLY (OUTPATIENT)
Dept: RESEARCH | Facility: CLINIC | Age: 63
End: 2023-04-28
Payer: COMMERCIAL

## 2023-04-28 DIAGNOSIS — Z00.6 RESEARCH SUBJECT: Primary | ICD-10-CM

## 2023-04-28 NOTE — PROGRESS NOTES
"PROPEL IT CONSENT DOCUMENTATION  Oncore Protocol 2022013: PROPEL IT  PROmoting Successful Weight Loss in Primary CarE in [place] (PROPEL) using Information Technology  : Kj Clayton, PhD.  IRB of Record: [facility] ([facility]) ID# [facility] [id number]  [facility] Investigator: Tamara Chaudhari MD      Informed Consent Process   Name of Study Team member Present for discussion: N/A   Prior to the Informed Consent being signed or any protocol required testing, procedure or intervention being performed, the following was completed or discussed:   Purpose of the study, qualifications to participate:  Study design, schedule and procedure:  Risks, benefits, alternative treatments, compensation and costs:  Confidentiality and HIPAA authorization for Release of Medical Records for the research trial/subjects right/study related injury:  Study related contact information:  Voluntary participation and withdrawal from the research trial at any time:  Patient has been offered the opportunity to ask questions regarding the study    and PI contact information given to subject:  Signed copy of consent form was provided to the subject via eMail:  Original consent or copy of electronic consent in subject's chart and uploaded in EPIC:        Aminta Schreiber electronically signed the informed consent form.     Was the consent done electronically: Yes  Consent Signature Date (add to  note) ("Today's Date REDCap):  4-  The consent form as reviewed by Nicol Khanna  Name: ([facility] personnel reviewing consent form):  Emi Rodriguez, Associate Clinical Research Coordinator   Is the potential subject currently enrolled in any other research trials (per Epic)? No  Participant ID (REDCap ID) added to Research Study   Yes    Comments: See  for Consent Forms        I acknowledge that I have reviewed the informed consent form and viewed the " volunteer's electronically signed and dated the signature section of the consent forms.    Yes

## 2023-04-28 NOTE — PROGRESS NOTES
PROPEL IT Weight Loss: Eligibility Confirmation  Remember to add Participant ID in Research Study  Age as of Today: 62 y.o.    Is patient age 40 to 70 years Yes    Is patient race Black/: Epic: Black or      Primary care provider at [facility]: Verenice Chase, DO     Does the patient have an active [facility] portal account?  Yes    Does the patient have prediabetes or Type 2 diabetes? Yes  If yes, Based on: Type II diabetes ICD10 code    LAST HBA1C   Lab Results   Component Value Date    HGBA1C 7.0 (H) 04/17/2023    HGBA1C 6.5 (H) 06/24/2022    HGBA1C 7.3 (H) 03/25/2022          LAST BMI:   BMI Readings from Last 1 Encounters:   04/24/23 49.89 kg/m²      Was the patient's most recent BMI (within the past 12 months) between 30 and 50  yes    PCP REFERRAL  Did provider acknowledge or deny patient's participation? Need to Pend Order to PCP (after Consent)  _____________________________________________  LAST WEIGHT  (verify if this was an outpatient):   Wt Readings from Last 4 Encounters:   04/24/23 136 kg (299 lb 13.2 oz)   11/22/22 (!) 136.6 kg (301 lb 3.2 oz)   09/06/22 132.5 kg (292 lb)   07/19/22 132.5 kg (292 lb)        PROPEL-IT Screening Date (enter from [employer]): 4-  Does the patient have a baseline clinic weight collected within 4 weeks (34 days) of Screening Date?  Yes, has baseline clinical wt                    When is the patient's next scheduled clinic appointment (potential wt)? 7-    Status Updated: 04/28/2023

## 2023-04-29 ENCOUNTER — PATIENT MESSAGE (OUTPATIENT)
Dept: PRIMARY CARE CLINIC | Facility: CLINIC | Age: 63
End: 2023-04-29
Payer: COMMERCIAL

## 2023-04-29 DIAGNOSIS — K21.9 GASTROESOPHAGEAL REFLUX DISEASE WITHOUT ESOPHAGITIS: ICD-10-CM

## 2023-04-29 DIAGNOSIS — E11.9 TYPE 2 DIABETES MELLITUS WITHOUT COMPLICATION, WITHOUT LONG-TERM CURRENT USE OF INSULIN: ICD-10-CM

## 2023-04-30 NOTE — TELEPHONE ENCOUNTER
No care due was identified.  [facility] Embedded Care Due Messages. Reference number: [id number].   4/29/2023 11:23:40 PM CDT

## 2023-05-01 RX ORDER — OMEPRAZOLE 40 MG/1
40 CAPSULE, DELAYED RELEASE ORAL EVERY MORNING
Qty: 90 CAPSULE | Refills: 0 | Status: SHIPPED | OUTPATIENT
Start: 2023-05-01 | End: 2023-07-05 | Stop reason: SDUPTHER

## 2023-05-01 RX ORDER — METFORMIN HYDROCHLORIDE 500 MG/1
1000 TABLET, EXTENDED RELEASE ORAL DAILY
Qty: 180 TABLET | Refills: 3 | Status: SHIPPED | OUTPATIENT
Start: 2023-05-01 | End: 2023-10-21 | Stop reason: SDUPTHER

## 2023-05-01 RX ORDER — METFORMIN HYDROCHLORIDE 500 MG/1
TABLET, EXTENDED RELEASE ORAL
Qty: 180 TABLET | Refills: 1 | Status: SHIPPED | OUTPATIENT
Start: 2023-05-01 | End: 2023-05-01 | Stop reason: SDUPTHER

## 2023-05-01 NOTE — TELEPHONE ENCOUNTER
Refill Encounter    PCP Visits: Recent Visits  Date Type Provider Dept   04/24/23 Office Visit Verenice Chase DO [facility] Primary Care   06/27/22 Office Visit Verenice Chase DO [facility]   Showing recent visits within past 360 days and meeting all other requirements  Future Appointments  Date Type Provider Dept   07/24/23 Appointment Verenice Chase DO [facility] Primary Care   Showing future appointments within next 720 days and meeting all other requirements     Last 3 Blood Pressure:   BP Readings from Last 3 Encounters:   04/24/23 136/80   11/22/22 124/72   06/27/22 106/72     Preferred Pharmacy:   [facility] [place], [place] - [address]  [address] [address]  Phone: [phone] Fax: [phone]    [facility]  [address]  [facility] [address]  Phone: [phone] Fax: [phone]    [facility] [facility] [place] - [address] [address]  [address]  [facility] [address]  Phone: [phone] Fax: [phone]    Requested RX:  Requested Prescriptions     Pending Prescriptions Disp Refills    SITagliptin phosphate (JANUVIA) 25 MG Tab       Sig: Januvia 25 mg tablet   Take 1 tablet every day by oral route for 90 days.    omeprazole (PRILOSEC) 40 MG capsule 90 capsule 0     Sig: Take 1 capsule (40 mg total) by mouth every morning.    metFORMIN (GLUCOPHAGE-XR) 500 MG ER 24hr tablet 180 tablet 1     Sig: Take 2 tablets (1,000 mg total) by mouth once daily.      RX Route: Normal

## 2023-05-01 NOTE — TELEPHONE ENCOUNTER
No care due was identified.  [facility] Embedded Care Due Messages. Reference number: [id number].   5/01/2023 9:47:18 AM CDT

## 2023-05-01 NOTE — TELEPHONE ENCOUNTER
Refill Decision Note   Aminta Delaneyey  is requesting a refill authorization.  Brief Assessment and Rationale for Refill:  Approve     Medication Therapy Plan:         Comments:     Note composed:7:47 AM 05/01/2023

## 2023-05-02 ENCOUNTER — RESEARCH ENCOUNTER (OUTPATIENT)
Dept: RESEARCH | Facility: CLINIC | Age: 63
End: 2023-05-02
Payer: COMMERCIAL

## 2023-05-02 DIAGNOSIS — Z00.6 RESEARCH SUBJECT: ICD-10-CM

## 2023-05-05 ENCOUNTER — TELEPHONE (OUTPATIENT)
Dept: RESEARCH | Facility: CLINIC | Age: 63
End: 2023-05-05
Payer: COMMERCIAL

## 2023-05-05 NOTE — TELEPHONE ENCOUNTER
"Scale Delivery Service Confirmation Date in REDCap: 5/3/2023  Address delivered To in [facility]: [address], [place]  [address]    Study Overview explained ([facility] , [facility] team, [facility] Research Coordinator) yes    Scale Received? YES    Pt has "first weights" to share: no    RECEIVED SCALE  ONLY  BodyTrace Scale Basic Tips reviewed (scale surface, morning, minimal clothing, not waterproof) yes    Pt requested assistance (to talk through) for the scale setup:   Yes AND scale worked properly    Email Reminders explained: Explain any No/NA  Lets take you first PROPEL weight containing link to enter weights Yes (please explain)     Check off "Call completed" on Intervention Onboarding form to trigger "first weight email"Yes     EVERYONE  Health  visit scheduled for 5/9/2023 at 10:00 am with Emerita Horn.  Comments]: Unanswered questions:none  Remind pt to save the Health  number to their contacts: [phone]  ?   Post Call Follow-up: Call Completed.      Reminders:   Research Studies: Add Branch B Intervention  Research Study Calendar: Edit Plan> Visit 1 Planned for Date >select date of 1st scheduled visit  Episodes of Care: PROPEL; PROPEL IT - ARM B  Care Teams: Add users, Admission notification, End Date (2y +1M)    "

## 2023-05-09 ENCOUNTER — PATIENT OUTREACH (OUTPATIENT)
Dept: RESEARCH | Facility: CLINIC | Age: 63
End: 2023-05-09
Payer: COMMERCIAL

## 2023-05-09 ENCOUNTER — PATIENT MESSAGE (OUTPATIENT)
Dept: RESEARCH | Facility: CLINIC | Age: 63
End: 2023-05-09

## 2023-05-09 DIAGNOSIS — Z00.6 RESEARCH SUBJECT: ICD-10-CM
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
## 2023-05-14 ENCOUNTER — PATIENT MESSAGE (OUTPATIENT)
Dept: PRIMARY CARE CLINIC | Facility: CLINIC | Age: 63
End: 2023-05-14
Payer: COMMERCIAL

## 2023-05-14 DIAGNOSIS — D64.9 ANEMIA, UNSPECIFIED TYPE: ICD-10-CM

## 2023-05-15 RX ORDER — FOLIC ACID 1 MG/1
1000 TABLET ORAL DAILY
Qty: 90 TABLET | Refills: 3 | Status: SHIPPED | OUTPATIENT
Start: 2023-05-15 | End: 2023-05-29 | Stop reason: SDUPTHER

## 2023-05-15 NOTE — TELEPHONE ENCOUNTER
Refill Encounter    PCP Visits: Recent Visits  Date Type Provider Dept   04/24/23 Office Visit Verenice Chase DO [facility] Primary Care   06/27/22 Office Visit Verenice Chase DO [facility]   Showing recent visits within past 360 days and meeting all other requirements  Future Appointments  Date Type Provider Dept   07/24/23 Appointment Verenice Chase DO [facility] Primary Care   Showing future appointments within next 720 days and meeting all other requirements     Last 3 Blood Pressure:   BP Readings from Last 3 Encounters:   04/24/23 136/80   11/22/22 124/72   06/27/22 106/72     Preferred Pharmacy:   [facility] [place], [place] - [address]  [address] [facility] [address]  Phone: [phone] Fax: [phone]    [facility]  [address]  [place] [address]  Phone: [phone] Fax: [phone]    [facility] [place] - [address]  [address] [address]  [place] [address]  Phone: [phone] Fax: [phone]    Requested RX:  Requested Prescriptions     Pending Prescriptions Disp Refills    folic acid (FOLVITE) 1 MG tablet 90 tablet 3     Sig: Take 1 tablet (1,000 mcg total) by mouth once daily.      RX Route: Normal

## 2023-05-16 ENCOUNTER — PATIENT OUTREACH (OUTPATIENT)
Dept: RESEARCH | Facility: CLINIC | Age: 63
End: 2023-05-16
Payer: COMMERCIAL

## 2023-05-16 NOTE — PROGRESS NOTES
05/16/2023  10:17 AM    Patient Name Aminta Schreiber   Appointment Department [facility] PROP WEIGHT MANAGEMENT  Visit Type Audio (Virtual visit)    Clinic Weights   Wt Readings from Last 3 Encounters:   04/24/23 0934 136 kg (299 lb 13.2 oz)   11/22/22 1113 (!) 136.6 kg (301 lb 3.2 oz)   09/06/22 1105 132.5 kg (292 lb)     Last Reported Weights No data was found      [facility] PROP INTERVENTION CONTACT:   Method of contact:  Phone Call   or Participant-Initiated Contact?:  -initiated contact  Type of intervention Contact:  Scheduled Session  Did the participant attend session?: Yes    Was the patient called within 15 minutes of the scheduled appointment?:  Yes  Is this a make-up session?: No    Which session materials covered in session?:  Session 2  Patient Reported Weight (From External Nancy):  295.19  Patient-reported weekly minutes of physical activity::  30  Calorie Prescription::  1400  Safety Criteria Triggered:  None  Toolbox Triggered:  None  Weight Graph Stoplight Status for Dietary Adherence:  Green Light     Goals    None          Additional Notes:    Patient reports Doing Well. Patient is Highly Motivated with being 295 pounds today and aims to Get Below that weight next week. Patient's plans for eating this week include Buying pre-packaged meals and Packing healthy snacks. For exercise, patient plans to continue to focus on moving more.    Goals:  Continue to follow the meal plan  Continue to focus on moving more during the day      Emerita Horn, MS, RD, LDN, [facility]  [facility]   [phone]

## 2023-05-23 ENCOUNTER — PATIENT OUTREACH (OUTPATIENT)
Dept: RESEARCH | Facility: CLINIC | Age: 63
End: 2023-05-23
Payer: COMMERCIAL

## 2023-05-23 ENCOUNTER — PATIENT MESSAGE (OUTPATIENT)
Dept: RESEARCH | Facility: CLINIC | Age: 63
End: 2023-05-23

## 2023-05-23 NOTE — PROGRESS NOTES
05/23/2023  9:54 AM    Patient Name Aminta Schreiber   Appointment Department [facility] BRIEN WEIGHT MANAGEMENT  Visit Type Audio (Virtual visit)    Clinic Weights   Wt Readings from Last 3 Encounters:   04/24/23 0934 136 kg (299 lb 13.2 oz)   11/22/22 1113 (!) 136.6 kg (301 lb 3.2 oz)   09/06/22 1105 132.5 kg (292 lb)     Last Reported Weights No data was found      [facility] INTERVENTION CONTACT:   Method of contact:  Phone Call   or Participant-Initiated Contact?:  -initiated contact  Type of intervention Contact:  Scheduled Session  Did the participant attend session?: Yes    Was the patient called within 15 minutes of the scheduled appointment?:  Yes  Is this a make-up session?: No    Which session materials covered in session?:  Session 3  Patient Reported Weight (From External Nancy):  294.75  Patient-reported weekly minutes of physical activity::  30  Calorie Prescription::  1400  Safety Criteria Triggered:  None  Toolbox Triggered:  None  Weight Graph Stoplight Status for Dietary Adherence:  Green Light     Goals    None          Additional Notes:    Patient reports Doing Well. Patient is Highly Motivated with being 294 pounds today and aims to Get Below that weight next week. Patient's plans for eating this week include Buying pre-packaged meals and snacks. Patient also asked about preparing her own meals. I encouraged her to stick to the single serving meals mostly and to measure all ingredients when cooking. For exercise, patient plans to start walking with her daughter 3 days a week.    Goals:  Walk with your daughter 3 days a week      Emerita Horn, MS, RD, LDN, [facility]   [phone]

## 2023-05-27 ENCOUNTER — PATIENT MESSAGE (OUTPATIENT)
Dept: PRIMARY CARE CLINIC | Facility: CLINIC | Age: 63
End: 2023-05-27
Payer: COMMERCIAL

## 2023-05-27 DIAGNOSIS — D64.9 ANEMIA, UNSPECIFIED TYPE: ICD-10-CM

## 2023-05-29 RX ORDER — FOLIC ACID 1 MG/1
1000 TABLET ORAL DAILY
Qty: 90 TABLET | Refills: 3 | Status: SHIPPED | OUTPATIENT
Start: 2023-05-29

## 2023-05-29 NOTE — TELEPHONE ENCOUNTER
Refill Encounter    PCP Visits: Recent Visits  Date Type Provider Dept   04/24/23 Office Visit Verenice Chase DO [facility] Primary Care   06/27/22 Office Visit Verenice Chase DO [facility]   Showing recent visits within past 360 days and meeting all other requirements  Future Appointments  Date Type Provider Dept   07/24/23 Appointment Verenice Chase DO [facility] Primary Care   Showing future appointments within next 720 days and meeting all other requirements     Last 3 Blood Pressure:   BP Readings from Last 3 Encounters:   04/24/23 136/80   11/22/22 124/72   06/27/22 106/72     Preferred Pharmacy:   [facility] [place], [place] - [address]  [address] [facility] [address]  Phone: [phone] Fax: [phone]    [facility]  [address]  [place] [address]  Phone: [phone] Fax: [phone]    [facility] [place] - [address] [address]  [address]  [place] [address]  Phone: [phone] Fax: [phone]    Requested RX:  Requested Prescriptions     Pending Prescriptions Disp Refills    folic acid (FOLVITE) 1 MG tablet 90 tablet 3     Sig: Take 1 tablet (1,000 mcg total) by mouth once daily.      RX Route: Normal

## 2023-05-30 ENCOUNTER — PATIENT OUTREACH (OUTPATIENT)
Dept: RESEARCH | Facility: CLINIC | Age: 63
End: 2023-05-30
Payer: COMMERCIAL

## 2023-05-30 NOTE — PROGRESS NOTES
05/30/2023  10:02 AM    Patient Name Aminta Schreiber   Appointment Department [facility] PROP WEIGHT MANAGEMENT  Visit Type Audio (Virtual visit)    Clinic Weights   Wt Readings from Last 3 Encounters:   04/24/23 0934 136 kg (299 lb 13.2 oz)   11/22/22 1113 (!) 136.6 kg (301 lb 3.2 oz)   09/06/22 1105 132.5 kg (292 lb)     Last Reported Weights No data was found      [facility] INTERVENTION CONTACT:   Method of contact:  Phone Call   or Participant-Initiated Contact?:  -initiated contact  Type of intervention Contact:  Scheduled Session  Did the participant attend session?: Yes    Was the patient called within 15 minutes of the scheduled appointment?:  Yes  Is this a make-up session?: No    Which session materials covered in session?:  Session 4  Patient Reported Weight (From External Nancy):  292.11  Patient-reported weekly minutes of physical activity::  90  Calorie Prescription::  1400  Safety Criteria Triggered:  None  Toolbox Triggered:  None  Weight Graph Stoplight Status for Dietary Adherence:  Green Light     Goals    None          Additional Notes:    Patient reports Doing Well. Patient is Highly Motivated with being 292 pounds today and aims to Get Below that weight next week. Patient's plans for eating this week include Buying pre-packaged meals and Other eating regular meals to decrease the urge to snack . For exercise, patient plans to continue with new exercise routine.    Goals:  Stay consistent with new exercise routine    Emerita Horn, MS, RD, LDN, [facility]  [facility]

## 2023-06-06 ENCOUNTER — PATIENT OUTREACH (OUTPATIENT)
Dept: RESEARCH | Facility: CLINIC | Age: 63
End: 2023-06-06
Payer: COMMERCIAL

## 2023-06-06 NOTE — PROGRESS NOTES
06/06/2023  9:56 AM    Patient Name Aminta Schreiber   Appointment Department [facility] PROP WEIGHT MANAGEMENT  Visit Type Audio (Virtual visit)    Clinic Weights   Wt Readings from Last 3 Encounters:   04/24/23 0934 136 kg (299 lb 13.2 oz)   11/22/22 1113 (!) 136.6 kg (301 lb 3.2 oz)   09/06/22 1105 132.5 kg (292 lb)     Last Reported Weights No data was found      [facility] INTERVENTION CONTACT:   Method of contact:  Phone Call   or Participant-Initiated Contact?:  -initiated contact  Type of intervention Contact:  Scheduled Session  Did the participant attend session?: Yes    Was the patient called within 15 minutes of the scheduled appointment?:  Yes  Is this a make-up session?: No    Which session materials covered in session?:  Session 5  Patient Reported Weight (From External Nancy):  290.34  Patient-reported weekly minutes of physical activity::  60  Calorie Prescription::  1400  Safety Criteria Triggered:  None  Toolbox Triggered:  None  Weight Graph Stoplight Status for Dietary Adherence:  Green Light     Goals    None          Additional Notes:    Patient reports Doing Well. Patient is Highly Motivated with being 290 pounds today and aims to Get Below that weight next week. Patient's plans for eating this week include Packing healthy snacks and Other measuring all foods prepared at home . For exercise, patient plans to increase exercise time.    Goals:  Increase exercise from 20 minutes 3 days a week to 25 minutes 3 days a week.    Emerita Horn, MS, RD, LDN, [facility]  [facility]   [phone]

## 2023-06-08 DIAGNOSIS — Z12.31 ENCOUNTER FOR SCREENING MAMMOGRAM FOR MALIGNANT NEOPLASM OF BREAST: Primary | ICD-10-CM

## 2023-06-12 ENCOUNTER — OFFICE VISIT (OUTPATIENT)
Dept: URGENT CARE | Facility: CLINIC | Age: 63
End: 2023-06-12
Payer: COMMERCIAL

## 2023-06-12 VITALS
RESPIRATION RATE: 20 BRPM | OXYGEN SATURATION: 98 % | BODY MASS INDEX: 48.82 KG/M2 | DIASTOLIC BLOOD PRESSURE: 63 MMHG | HEART RATE: 74 BPM | SYSTOLIC BLOOD PRESSURE: 141 MMHG | WEIGHT: 293 LBS | HEIGHT: 65 IN | TEMPERATURE: 98 F

## 2023-06-12 DIAGNOSIS — H61.21 IMPACTED CERUMEN OF RIGHT EAR: Primary | ICD-10-CM

## 2023-06-12 DIAGNOSIS — H92.01 OTALGIA OF RIGHT EAR: ICD-10-CM

## 2023-06-12 PROCEDURE — 69210 REMOVE IMPACTED EAR WAX UNI: CPT | Mod: S$GLB,,, | Performed by: NURSE PRACTITIONER

## 2023-06-12 PROCEDURE — 69210 EAR CERUMEN REMOVAL: ICD-10-PCS | Mod: S$GLB,,, | Performed by: NURSE PRACTITIONER

## 2023-06-12 PROCEDURE — 99213 OFFICE O/P EST LOW 20 MIN: CPT | Mod: 25,S$GLB,, | Performed by: NURSE PRACTITIONER

## 2023-06-12 PROCEDURE — 99213 PR OFFICE/OUTPT VISIT, EST, LEVL III, 20-29 MIN: ICD-10-PCS | Mod: 25,S$GLB,, | Performed by: NURSE PRACTITIONER

## 2023-06-12 NOTE — PROGRESS NOTES
"Subjective:      Patient ID: Aminta Schreiber is a 62 y.o. female.    Vitals:  height is 5' 5" (1.651 m) and weight is 135.6 kg (299 lb). Her oral temperature is 97.8 °F (36.6 °C). Her blood pressure is 141/63 (abnormal) and her pulse is 74. Her respiration is 20 and oxygen saturation is 98%.     Chief Complaint: Otalgia    Pt complain of right ear pain that started 6 days ago. Pt used ear drops which gave no relief.    Otalgia   There is pain in the right ear. This is a new problem. Episode onset: 6 days ago. The problem has been gradually worsening. There has been no fever. The pain is at a severity of 4/10 (headache). The pain is mild. Associated symptoms include headaches. Pertinent negatives include no abdominal pain, coughing, diarrhea, ear discharge, hearing loss, neck pain, rash, rhinorrhea, sore throat or vomiting. She has tried ear drops for the symptoms. The treatment provided no relief. There is no history of a chronic ear infection, hearing loss or a tympanostomy tube.     HENT:  Positive for ear pain. Negative for ear discharge, hearing loss and sore throat.    Neck: Negative for neck pain.   Respiratory:  Negative for cough.    Gastrointestinal:  Negative for abdominal pain, vomiting and diarrhea.   Skin:  Negative for rash.   Neurological:  Positive for headaches.    Objective:     Physical Exam   Constitutional: She is oriented to person, place, and time.   HENT:   Head: Normocephalic and atraumatic.   Ears:   Right Ear: impacted cerumen  Left Ear: impacted cerumen  Cardiovascular: Normal rate.   Pulmonary/Chest: Effort normal. No respiratory distress.   Abdominal: Normal appearance.   Neurological: She is alert and oriented to person, place, and time.   Skin: Skin is warm and dry.   Psychiatric: Her behavior is normal. Mood normal.       Cerumen successfully cleared from ear  Assessment:     1. Impacted cerumen of right ear    2. Otalgia of right ear        Plan:   Successful removal of cerumen impaction " patient reports improvement of pain.  She is able to hear.  May use Debrox once a month prevent buildup           Impacted cerumen of right ear  -     Ear wax removal  -     Ear Cerumen Removal    Otalgia of right ear  -     Ear wax removal  -     Ear Cerumen Removal

## 2023-06-12 NOTE — PROCEDURES
Ear Cerumen Removal    Date/Time: 6/12/2023 2:30 PM  Performed by: Akanksha Starr NP  Authorized by: Akanksha Starr NP     Consent Done?:  Yes (Verbal)    Local anesthetic:  None  Medication Used:  Cerumenex  Location details:  Right ear  Procedure type: curette    Cerumen  Removal Results:  Cerumen completely removed  Patient tolerance:  Patient tolerated the procedure well with no immediate complications

## 2023-06-13 ENCOUNTER — PATIENT OUTREACH (OUTPATIENT)
Dept: RESEARCH | Facility: CLINIC | Age: 63
End: 2023-06-13
Payer: COMMERCIAL

## 2023-06-13 NOTE — PROGRESS NOTES
06/13/2023  9:57 AM    Patient Name Aminta Schreiber   Appointment Department [facility] PROP WEIGHT MANAGEMENT  Visit Type Audio (Virtual visit)    Clinic Weights   Wt Readings from Last 3 Encounters:   06/12/23 1422 135.6 kg (299 lb)   04/24/23 0934 136 kg (299 lb 13.2 oz)   11/22/22 1113 (!) 136.6 kg (301 lb 3.2 oz)     Last Reported Weights No data was found      [facility] INTERVENTION CONTACT:   Method of contact:  Phone Call   or Participant-Initiated Contact?:  -initiated contact  Type of intervention Contact:  Scheduled Session  Did the participant attend session?: Yes    Was the patient called within 15 minutes of the scheduled appointment?:  Yes  Is this a make-up session?: No    Which session materials covered in session?:  Session 6  Patient Reported Weight (From External Nancy):  287.26  Patient-reported weekly minutes of physical activity::  60  Calorie Prescription::  1400  Safety Criteria Triggered:  None  Toolbox Triggered:  None  Weight Graph Stoplight Status for Dietary Adherence:  Green Light     Goals    None          Additional Notes:    Patient reports Doing Well. Patient is Highly Motivated with being 287 pounds today and aims to Get Below that weight next week. Patient's plans for eating this week include Other continuing to use portion controlled foods and measuring foods when cooking . For exercise, patient plans to continue to work on increasing exercise.    Goals:  Increase exercise from 20 minutes 3 days a week to 25 minutes 3 days a week    Emerita Horn, MS, RD, LDN, [facility]  [facility]   [phone]

## 2023-06-15 ENCOUNTER — TELEPHONE (OUTPATIENT)
Dept: URGENT CARE | Facility: CLINIC | Age: 63
End: 2023-06-15
Payer: COMMERCIAL

## 2023-06-20 ENCOUNTER — PATIENT OUTREACH (OUTPATIENT)
Dept: RESEARCH | Facility: CLINIC | Age: 63
End: 2023-06-20
Payer: COMMERCIAL

## 2023-06-20 NOTE — PROGRESS NOTES
06/20/2023  4:28 PM    Patient Name Aminta Schreiber   Appointment Department [facility] PROP WEIGHT MANAGEMENT  Visit Type Audio (Virtual visit)    Clinic Weights   Wt Readings from Last 3 Encounters:   06/12/23 1422 135.6 kg (299 lb)   04/24/23 0934 136 kg (299 lb 13.2 oz)   11/22/22 1113 (!) 136.6 kg (301 lb 3.2 oz)     Last Reported Weights No data was found      [facility] INTERVENTION CONTACT:   Method of contact:  Phone Call   or Participant-Initiated Contact?:  -initiated contact  Type of intervention Contact:  Scheduled Session  Did the participant attend session?: Yes    Was the patient called within 15 minutes of the scheduled appointment?:  Yes  Is this a make-up session?: No    Which session materials covered in session?:  Session 7  Patient Reported Weight (From External Nancy):  287.04  Patient-reported weekly minutes of physical activity::  0  Calorie Prescription::  1400  Safety Criteria Triggered:  None  Toolbox Triggered:  None  Weight Graph Stoplight Status for Dietary Adherence:  Green Light     Goals    None          Additional Notes:    Patient reports Other back pain, which has caused a decrease in activity . Patient is Poorly Motivated with being 287 pounds today and aims to Get Below that weight next week. Patient's plans for eating this week include Buying pre-packaged meals and Packing healthy snacks. For exercise, patient plans to resume normal activity once her back pain is improved.    Goals:  Use single serving and portion-controlled foods to stick to the meal plan    Emerita Horn, MS, RD, LDN, [facility]  [facility]

## 2023-06-27 ENCOUNTER — OFFICE VISIT (OUTPATIENT)
Dept: URGENT CARE | Facility: CLINIC | Age: 63
End: 2023-06-27
Payer: COMMERCIAL

## 2023-06-27 ENCOUNTER — PATIENT OUTREACH (OUTPATIENT)
Dept: RESEARCH | Facility: CLINIC | Age: 63
End: 2023-06-27
Payer: COMMERCIAL

## 2023-06-27 VITALS
OXYGEN SATURATION: 96 % | SYSTOLIC BLOOD PRESSURE: 137 MMHG | HEART RATE: 74 BPM | TEMPERATURE: 99 F | HEIGHT: 65 IN | BODY MASS INDEX: 48.82 KG/M2 | RESPIRATION RATE: 18 BRPM | DIASTOLIC BLOOD PRESSURE: 65 MMHG | WEIGHT: 293 LBS

## 2023-06-27 DIAGNOSIS — M54.50 ACUTE BILATERAL LOW BACK PAIN WITHOUT SCIATICA: Primary | ICD-10-CM

## 2023-06-27 PROCEDURE — 99213 PR OFFICE/OUTPT VISIT, EST, LEVL III, 20-29 MIN: ICD-10-PCS | Mod: S$GLB,,, | Performed by: PHYSICIAN ASSISTANT

## 2023-06-27 PROCEDURE — 99213 OFFICE O/P EST LOW 20 MIN: CPT | Mod: S$GLB,,, | Performed by: PHYSICIAN ASSISTANT

## 2023-06-27 RX ORDER — NAPROXEN 500 MG/1
500 TABLET ORAL 2 TIMES DAILY WITH MEALS
Qty: 30 TABLET | Refills: 1 | Status: SHIPPED | OUTPATIENT
Start: 2023-06-27 | End: 2024-03-26

## 2023-06-27 RX ORDER — PREDNISONE 20 MG/1
60 TABLET ORAL
Status: COMPLETED | OUTPATIENT
Start: 2023-06-27 | End: 2023-06-27

## 2023-06-27 RX ORDER — CYCLOBENZAPRINE HCL 10 MG
10 TABLET ORAL 3 TIMES DAILY PRN
Qty: 30 TABLET | Refills: 0 | Status: SHIPPED | OUTPATIENT
Start: 2023-06-27 | End: 2023-07-07

## 2023-06-27 RX ADMIN — PREDNISONE 60 MG: 20 TABLET ORAL at 05:06

## 2023-06-27 NOTE — PROGRESS NOTES
06/27/2023  9:24 AM    Patient Name Aminta Schreiber   Appointment Department [facility] PROP WEIGHT MANAGEMENT  Visit Type Audio (Virtual visit)    Clinic Weights   Wt Readings from Last 3 Encounters:   06/12/23 1422 135.6 kg (299 lb)   04/24/23 0934 136 kg (299 lb 13.2 oz)   11/22/22 1113 (!) 136.6 kg (301 lb 3.2 oz)     Last Reported Weights No data was found      [facility] INTERVENTION CONTACT:   Method of contact:  Phone Call   or Participant-Initiated Contact?:  -initiated contact  Type of intervention Contact:  Scheduled Session  Did the participant attend session?: No    If no, please select a reason::  Family/Personal  Safety Criteria Triggered:  None  Weight Graph Stoplight Status for Dietary Adherence:  Green Light     Goals    None          Additional Notes:    1st missed appointment. Patient unable to attend appointment due to back injury. Unable to reschedule before next scheduled session.    Emerita Horn, MS, RD, LDN, [facility]  [facility]

## 2023-06-27 NOTE — PROGRESS NOTES
"Subjective:      Patient ID: Aminta Schreiber is a 63 y.o. female.    Vitals:  height is 5' 5" (1.651 m) and weight is 135.6 kg (299 lb). Her oral temperature is 98.5 °F (36.9 °C). Her blood pressure is 137/65 and her pulse is 74. Her respiration is 18 and oxygen saturation is 96%.     Chief Complaint: Back Pain    Patient complains of bilateral low back pain the last week.  She states got better 2 days ago the most part but they recurred last night.  She denies flank pain.  She denies fever chills.  She denies urinary symptoms.  Pain is worse with certain movements.  No bowel or bladder incontinence.  No leg weakness.  She had Naprosyn home the did help but she is now out of it    Back Pain  This is a new problem. The current episode started 1 to 4 weeks ago (about 2 weeks). The problem occurs constantly. The problem is unchanged. The pain is present in the lumbar spine. The quality of the pain is described as stabbing. The pain does not radiate. The pain is at a severity of 5/10. The pain is moderate. The pain is The same all the time. The symptoms are aggravated by bending, lying down, sitting, standing, position and coughing. Stiffness is present All day. Pertinent negatives include no dysuria, fever, leg pain, numbness or tingling. Treatments tried: naproxen. The treatment provided no relief.     Constitution: Negative for fever.   Genitourinary:  Negative for dysuria.   Musculoskeletal:  Positive for back pain.   Skin:  Negative for erythema.   Neurological:  Negative for numbness.    Objective:     Physical Exam   Constitutional: She is oriented to person, place, and time. She appears well-developed. She is cooperative. No distress.   HENT:   Head: Normocephalic and atraumatic.   Nose: Nose normal.   Mouth/Throat: Oropharynx is clear and moist and mucous membranes are normal.   Eyes: Conjunctivae and lids are normal.   Neck: Trachea normal and phonation normal. Neck supple.   Cardiovascular: Normal rate, regular " rhythm, normal heart sounds and normal pulses.   Pulmonary/Chest: Effort normal and breath sounds normal.   Abdominal: Normal appearance and bowel sounds are normal. She exhibits no mass. Soft.   Musculoskeletal:         General: Tenderness present. No swelling or deformity.        Back:       Comments: There is no spinal tenderness.  No erythema.  No rash.  There is reproducible tenderness palpation in marked areas bilateral no paraspinal areas.  Pain is reproducible with forward flexion.  Distal lower extremity sensory and motor are intact.   Neurological: She is alert and oriented to person, place, and time. She has normal strength and normal reflexes. No sensory deficit.   Skin: Skin is warm, dry, intact, not diaphoretic and no rash. No bruising and No erythema jaundice  Psychiatric: Her speech is normal and behavior is normal. Judgment and thought content normal.   Nursing note and vitals reviewed.    Assessment:     1. Acute bilateral low back pain without sciatica        Plan:       Acute bilateral low back pain without sciatica  -     predniSONE tablet 60 mg  -     naproxen (NAPROSYN) 500 MG tablet; Take 1 tablet (500 mg total) by mouth 2 (two) times daily with meals.  Dispense: 30 tablet; Refill: 1  -     cyclobenzaprine (FLEXERIL) 10 MG tablet; Take 1 tablet (10 mg total) by mouth 3 (three) times daily as needed for Muscle spasms (Back pain).  Dispense: 30 tablet; Refill: 0      Follow up if symptoms worsen or fail to improve, for F/U with PCP or ED. There are no Patient Instructions on file for this visit.

## 2023-06-30 ENCOUNTER — TELEPHONE (OUTPATIENT)
Dept: URGENT CARE | Facility: CLINIC | Age: 63
End: 2023-06-30
Payer: COMMERCIAL

## 2023-07-04 ENCOUNTER — PATIENT MESSAGE (OUTPATIENT)
Dept: PRIMARY CARE CLINIC | Facility: CLINIC | Age: 63
End: 2023-07-04
Payer: COMMERCIAL

## 2023-07-04 DIAGNOSIS — E11.69 HYPERLIPIDEMIA ASSOCIATED WITH TYPE 2 DIABETES MELLITUS: ICD-10-CM

## 2023-07-04 DIAGNOSIS — K21.9 GASTROESOPHAGEAL REFLUX DISEASE WITHOUT ESOPHAGITIS: ICD-10-CM

## 2023-07-04 DIAGNOSIS — E78.5 HYPERLIPIDEMIA ASSOCIATED WITH TYPE 2 DIABETES MELLITUS: ICD-10-CM

## 2023-07-04 RX ORDER — ROSUVASTATIN CALCIUM 10 MG/1
TABLET, COATED ORAL
Qty: 90 TABLET | Refills: 3 | Status: SHIPPED | OUTPATIENT
Start: 2023-07-04

## 2023-07-04 NOTE — TELEPHONE ENCOUNTER
No care due was identified.  [facility] Embedded Care Due Messages. Reference number: [id number].   7/04/2023 11:36:26 AM CDT

## 2023-07-04 NOTE — TELEPHONE ENCOUNTER
Refill Decision Note   Aminta Schreiber  is requesting a refill authorization.  Brief Assessment and Rationale for Refill:  Approve     Medication Therapy Plan:         Comments:     Note composed:11:53 AM 07/04/2023             Appointments     Last Visit   4/24/2023 Verenice Chase, DO   Next Visit   7/24/2023 Verenice Chase, DO

## 2023-07-05 ENCOUNTER — HOSPITAL ENCOUNTER (OUTPATIENT)
Dept: RADIOLOGY | Facility: HOSPITAL | Age: 63
Discharge: HOME | End: 2023-07-05
Attending: SPECIALIST
Payer: COMMERCIAL

## 2023-07-05 ENCOUNTER — PATIENT OUTREACH (OUTPATIENT)
Dept: RESEARCH | Facility: CLINIC | Age: 63
End: 2023-07-05
Payer: COMMERCIAL

## 2023-07-05 DIAGNOSIS — N64.4 BREAST PAIN: ICD-10-CM

## 2023-07-05 PROCEDURE — 77061 BREAST TOMOSYNTHESIS UNI: CPT | Mod: TC,LT

## 2023-07-05 PROCEDURE — 77061 MAMMO DIGITAL DIAGNOSTIC LEFT WITH TOMO: ICD-10-PCS | Mod: 26,LT,, | Performed by: RADIOLOGY

## 2023-07-05 PROCEDURE — 77065 DX MAMMO INCL CAD UNI: CPT | Mod: 26,LT,, | Performed by: RADIOLOGY

## 2023-07-05 PROCEDURE — 77061 BREAST TOMOSYNTHESIS UNI: CPT | Mod: 26,LT,, | Performed by: RADIOLOGY

## 2023-07-05 PROCEDURE — 77065 MAMMO DIGITAL DIAGNOSTIC LEFT WITH TOMO: ICD-10-PCS | Mod: 26,LT,, | Performed by: RADIOLOGY

## 2023-07-05 RX ORDER — OMEPRAZOLE 40 MG/1
40 CAPSULE, DELAYED RELEASE ORAL EVERY MORNING
Qty: 90 CAPSULE | Refills: 3 | Status: SHIPPED | OUTPATIENT
Start: 2023-07-05

## 2023-07-05 NOTE — PROGRESS NOTES
07/05/2023  12:45 PM    Patient Name Aminta Schreiber   Appointment Department [facility] BRIEN WEIGHT MANAGEMENT  Visit Type Audio (Virtual visit)    Clinic Weights   Wt Readings from Last 3 Encounters:   06/27/23 1649 135.6 kg (299 lb)   06/12/23 1422 135.6 kg (299 lb)   04/24/23 0934 136 kg (299 lb 13.2 oz)     Last Reported Weights No data was found      [facility] INTERVENTION CONTACT:   Method of contact:  Phone Call   or Participant-Initiated Contact?:  -initiated contact  Type of intervention Contact:  Scheduled Session  Did the participant attend session?: Yes    Was the patient called within 15 minutes of the scheduled appointment?:  Yes  Is this a make-up session?: No    Which session materials covered in session?:  Session 8 and Session 9  Patient Reported Weight (From External Nancy):  289.02  Patient-reported weekly minutes of physical activity::  0  Calorie Prescription::  1400  Safety Criteria Triggered:  None  Toolbox Triggered:  Adherence to diet and Physical Activity  Adherence to diet: Health  must choose at least two interventions from list::  Problem solving with SMART goals  Physical Activity::  Add steps to what you are already doing each day.  Weight Graph Stoplight Status for Dietary Adherence:  Red Light     Goals    None          Additional Notes:    Patient reports Other doing better than last week. She states that she is starting to get some relief from her back pain and plans to start walking again and following the meal plan. She admits that while she was in pain she was not exercising, but she was also not following the meal plan. She has purchased more meal plan foods and is motivated to get back on track . Patient is Not Motivated with being 289 pounds today and aims to Get Below that weight next week. Patient's plans for eating this week include Buying pre-packaged meals and Packing healthy snacks. For exercise, patient plans to gradually get back to her normal exercise  routine.    Goals:  Aim to walk at least 2 days this week  Get back to following the meal    Emerita Horn MS, RD, LDN, [facility] [place]  [facility]

## 2023-07-05 NOTE — TELEPHONE ENCOUNTER
No care due was identified.  [facility] Embedded Care Due Messages. Reference number: [id number].   7/05/2023 8:06:55 AM CDT

## 2023-07-05 NOTE — TELEPHONE ENCOUNTER
Refill Encounter    PCP Visits: Recent Visits  Date Type Provider Dept   04/24/23 Office Visit Verenice Chase DO [facility]   Showing recent visits within past 360 days and meeting all other requirements  Future Appointments  Date Type Provider Dept   07/24/23 Appointment Verenice Chase DO [facility]   Showing future appointments within next 720 days and meeting all other requirements     Last 3 Blood Pressure:   BP Readings from Last 3 Encounters:   06/27/23 137/65   06/12/23 (!) 141/63   04/24/23 136/80     Preferred Pharmacy:   [place], [place] - [address]   [address] [address]  Phone: [phone] Fax: [phone]    [id number] [address]  [address]  [place] [address]  Phone: [phone] Fax: [phone]    Requested RX:  Requested Prescriptions     Pending Prescriptions Disp Refills    omeprazole (PRILOSEC) 40 MG capsule 90 capsule 0     Sig: Take 1 capsule (40 mg total) by mouth every morning.      RX Route: Normal

## 2023-07-06 ENCOUNTER — PATIENT MESSAGE (OUTPATIENT)
Dept: RESEARCH | Facility: CLINIC | Age: 63
End: 2023-07-06
Payer: COMMERCIAL

## 2023-07-11 ENCOUNTER — PATIENT OUTREACH (OUTPATIENT)
Dept: RESEARCH | Facility: CLINIC | Age: 63
End: 2023-07-11
Payer: COMMERCIAL

## 2023-07-11 NOTE — PROGRESS NOTES
07/11/2023  10:02 AM    Patient Name Aminta Schreiber   Appointment Department [facility] PROP WEIGHT MANAGEMENT  Visit Type Audio (Virtual visit)    Clinic Weights   Wt Readings from Last 3 Encounters:   06/27/23 1649 135.6 kg (299 lb)   06/12/23 1422 135.6 kg (299 lb)   04/24/23 0934 136 kg (299 lb 13.2 oz)     Last Reported Weights No data was found      [facility] INTERVENTION CONTACT:   Method of contact:  Phone Call   or Participant-Initiated Contact?:  -initiated contact  Type of intervention Contact:  Scheduled Session  Did the participant attend session?: Yes    Was the patient called within 15 minutes of the scheduled appointment?:  Yes  Is this a make-up session?: No    Which session materials covered in session?:  Session 10  Patient Reported Weight (From External Nancy):  287.48  Patient-reported weekly minutes of physical activity::  40  Calorie Prescription::  1400  Safety Criteria Triggered:  None  Weight Graph Stoplight Status for Dietary Adherence:  Yellow Light - In the Zone     Goals    None          Additional Notes:    Patient reports Doing Well. Patient is Highly Motivated with being 287 pounds today and aims to Get Below that weight next week. Patient's plans for eating this week include Buying pre-packaged meals, Packing healthy snacks, and Other sticking to the meal plan . For exercise, patient plans to continue to work on getting back to her regular exercise routine.    Goals:  Continue to work on getting back to your regular diet and exercise routine    Emerita Horn, MS, RD, LDN, [facility]  [facility] [facility]   [phone]

## 2023-07-18 ENCOUNTER — PATIENT OUTREACH (OUTPATIENT)
Dept: RESEARCH | Facility: CLINIC | Age: 63
End: 2023-07-18
Payer: COMMERCIAL

## 2023-07-18 NOTE — PROGRESS NOTES
07/18/2023  9:55 AM    Patient Name Aminta Schreiber   Appointment Department [facility] PROP WEIGHT MANAGEMENT  Visit Type Audio (Virtual visit)    Clinic Weights   Wt Readings from Last 3 Encounters:   06/27/23 1649 135.6 kg (299 lb)   06/12/23 1422 135.6 kg (299 lb)   04/24/23 0934 136 kg (299 lb 13.2 oz)     Last Reported Weights No data was found      [facility] INTERVENTION CONTACT:   Method of contact:  Phone Call   or Participant-Initiated Contact?:  -initiated contact  Type of intervention Contact:  Scheduled Session  Did the participant attend session?: No    If no, please select a reason::  Other (please comment) (no-show)  Safety Criteria Triggered:  None  Weight Graph Stoplight Status for Dietary Adherence:  Red Light     Goals         Continue to work on getting back to your regular diet and exercise routine (pt-stated)                        Additional Notes:    1st missed appointment. Unable to reach patient before next scheduled session.    Emerita Horn, MS, RD, LDN, [facility]  [facility]

## 2023-07-20 PROBLEM — R10.9 BILATERAL FLANK PAIN: Status: RESOLVED | Noted: 2022-11-22 | Resolved: 2023-07-20

## 2023-07-20 PROBLEM — M79.601 PAIN OF RIGHT UPPER EXTREMITY: Status: RESOLVED | Noted: 2022-03-21 | Resolved: 2023-07-20
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
## 2023-07-24 ENCOUNTER — OFFICE VISIT (OUTPATIENT)
Dept: PRIMARY CARE CLINIC | Facility: CLINIC | Age: 63
End: 2023-07-24
Attending: FAMILY MEDICINE
Payer: COMMERCIAL

## 2023-07-24 VITALS
BODY MASS INDEX: 48.72 KG/M2 | SYSTOLIC BLOOD PRESSURE: 132 MMHG | HEIGHT: 65 IN | OXYGEN SATURATION: 100 % | HEART RATE: 88 BPM | WEIGHT: 292.44 LBS | DIASTOLIC BLOOD PRESSURE: 62 MMHG

## 2023-07-24 DIAGNOSIS — E11.9 TYPE 2 DIABETES MELLITUS WITHOUT COMPLICATION, WITHOUT LONG-TERM CURRENT USE OF INSULIN: Primary | ICD-10-CM

## 2023-07-24 DIAGNOSIS — E11.69 HYPERLIPIDEMIA ASSOCIATED WITH TYPE 2 DIABETES MELLITUS: ICD-10-CM

## 2023-07-24 DIAGNOSIS — R91.1 PULMONARY NODULE 1 CM OR GREATER IN DIAMETER: ICD-10-CM

## 2023-07-24 DIAGNOSIS — E78.5 HYPERLIPIDEMIA ASSOCIATED WITH TYPE 2 DIABETES MELLITUS: ICD-10-CM

## 2023-07-24 DIAGNOSIS — E66.01 CLASS 3 SEVERE OBESITY DUE TO EXCESS CALORIES WITH SERIOUS COMORBIDITY AND BODY MASS INDEX (BMI) OF 45.0 TO 49.9 IN ADULT: ICD-10-CM

## 2023-07-24 DIAGNOSIS — Z12.31 ENCOUNTER FOR SCREENING MAMMOGRAM FOR BREAST CANCER: ICD-10-CM

## 2023-07-24 DIAGNOSIS — D17.79 ADRENAL MYELOLIPOMA: ICD-10-CM

## 2023-07-24 DIAGNOSIS — I15.2 HYPERTENSION ASSOCIATED WITH TYPE 2 DIABETES MELLITUS: ICD-10-CM

## 2023-07-24 DIAGNOSIS — Z86.010 HISTORY OF COLON POLYPS: ICD-10-CM

## 2023-07-24 DIAGNOSIS — E11.59 HYPERTENSION ASSOCIATED WITH TYPE 2 DIABETES MELLITUS: ICD-10-CM

## 2023-07-24 DIAGNOSIS — F17.210 NICOTINE DEPENDENCE, CIGARETTES, UNCOMPLICATED: ICD-10-CM

## 2023-07-24 PROBLEM — D35.02 ADRENAL ADENOMA, LEFT: Status: RESOLVED | Noted: 2021-12-14 | Resolved: 2023-07-24

## 2023-07-24 PROBLEM — L20.9 ATOPIC DERMATITIS: Status: RESOLVED | Noted: 2022-10-11 | Resolved: 2023-07-24

## 2023-07-24 PROCEDURE — 3078F DIAST BP <80 MM HG: CPT | Mod: CPTII,S$GLB,, | Performed by: FAMILY MEDICINE

## 2023-07-24 PROCEDURE — 3066F PR DOCUMENTATION OF TREATMENT FOR NEPHROPATHY: ICD-10-PCS | Mod: CPTII,S$GLB,, | Performed by: FAMILY MEDICINE

## 2023-07-24 PROCEDURE — 3008F PR BODY MASS INDEX (BMI) DOCUMENTED: ICD-10-PCS | Mod: CPTII,S$GLB,, | Performed by: FAMILY MEDICINE

## 2023-07-24 PROCEDURE — 3078F PR MOST RECENT DIASTOLIC BLOOD PRESSURE < 80 MM HG: ICD-10-PCS | Mod: CPTII,S$GLB,, | Performed by: FAMILY MEDICINE

## 2023-07-24 PROCEDURE — 3061F NEG MICROALBUMINURIA REV: CPT | Mod: CPTII,S$GLB,, | Performed by: FAMILY MEDICINE

## 2023-07-24 PROCEDURE — 99214 PR OFFICE/OUTPT VISIT, EST, LEVL IV, 30-39 MIN: ICD-10-PCS | Mod: S$GLB,,, | Performed by: FAMILY MEDICINE

## 2023-07-24 PROCEDURE — 99214 OFFICE O/P EST MOD 30 MIN: CPT | Mod: S$GLB,,, | Performed by: FAMILY MEDICINE

## 2023-07-24 PROCEDURE — 3066F NEPHROPATHY DOC TX: CPT | Mod: CPTII,S$GLB,, | Performed by: FAMILY MEDICINE

## 2023-07-24 PROCEDURE — 3075F SYST BP GE 130 - 139MM HG: CPT | Mod: CPTII,S$GLB,, | Performed by: FAMILY MEDICINE

## 2023-07-24 PROCEDURE — 3044F PR MOST RECENT HEMOGLOBIN A1C LEVEL <7.0%: ICD-10-PCS | Mod: CPTII,S$GLB,, | Performed by: FAMILY MEDICINE

## 2023-07-24 PROCEDURE — 3075F PR MOST RECENT SYSTOLIC BLOOD PRESS GE 130-139MM HG: ICD-10-PCS | Mod: CPTII,S$GLB,, | Performed by: FAMILY MEDICINE

## 2023-07-24 PROCEDURE — 1159F PR MEDICATION LIST DOCUMENTED IN MEDICAL RECORD: ICD-10-PCS | Mod: CPTII,S$GLB,, | Performed by: FAMILY MEDICINE

## 2023-07-24 PROCEDURE — 3061F PR NEG MICROALBUMINURIA RESULT DOCUMENTED/REVIEW: ICD-10-PCS | Mod: CPTII,S$GLB,, | Performed by: FAMILY MEDICINE

## 2023-07-24 PROCEDURE — 99999 PR PBB SHADOW E&M-EST. PATIENT-LVL V: ICD-10-PCS | Mod: PBBFAC,,, | Performed by: FAMILY MEDICINE

## 2023-07-24 PROCEDURE — 3008F BODY MASS INDEX DOCD: CPT | Mod: CPTII,S$GLB,, | Performed by: FAMILY MEDICINE

## 2023-07-24 PROCEDURE — 1159F MED LIST DOCD IN RCRD: CPT | Mod: CPTII,S$GLB,, | Performed by: FAMILY MEDICINE

## 2023-07-24 PROCEDURE — 3044F HG A1C LEVEL LT 7.0%: CPT | Mod: CPTII,S$GLB,, | Performed by: FAMILY MEDICINE

## 2023-07-24 PROCEDURE — 99999 PR PBB SHADOW E&M-EST. PATIENT-LVL V: CPT | Mod: PBBFAC,,, | Performed by: FAMILY MEDICINE

## 2023-07-24 RX ORDER — FERROUS SULFATE 325(65) MG
1 TABLET ORAL DAILY
COMMUNITY

## 2023-07-26 ENCOUNTER — PATIENT MESSAGE (OUTPATIENT)
Dept: RESEARCH | Facility: CLINIC | Age: 63
End: 2023-07-26
Payer: COMMERCIAL

## 2023-08-01 ENCOUNTER — PATIENT OUTREACH (OUTPATIENT)
Dept: RESEARCH | Facility: CLINIC | Age: 63
End: 2023-08-01
Payer: COMMERCIAL

## 2023-08-01 NOTE — PROGRESS NOTES
08/01/2023  9:56 AM    Patient Name Aminta Schreiber   Appointment Department [facility] PROP WEIGHT MANAGEMENT  Visit Type Audio (Virtual visit)    Clinic Weights   Wt Readings from Last 3 Encounters:   07/24/23 1022 132.6 kg (292 lb 7 oz)   06/27/23 1649 135.6 kg (299 lb)   06/12/23 1422 135.6 kg (299 lb)     Last Reported Weights No data was found      [facility] INTERVENTION CONTACT:   Method of contact:  Phone Call   or Participant-Initiated Contact?:  -initiated contact  Type of intervention Contact:  Scheduled Session  Did the participant attend session?: No    If no, please select a reason::  Other (please comment) (no-show)  Safety Criteria Triggered:  None  Weight Graph Stoplight Status for Dietary Adherence:  Red Light       Goals         Continue to work on getting back to your regular diet and exercise routine (pt-stated)                        Additional Notes:    2nd missed appointment. Unable to reach patient before next scheduled session    Emerita Horn, MS, RD, LDN, [facility]  [employer] [facility]

## 2023-08-14 ENCOUNTER — PATIENT OUTREACH (OUTPATIENT)
Dept: ADMINISTRATIVE | Facility: HOSPITAL | Age: 63
End: 2023-08-14
Payer: COMMERCIAL

## 2023-08-22 ENCOUNTER — PATIENT OUTREACH (OUTPATIENT)
Dept: RESEARCH | Facility: CLINIC | Age: 63
End: 2023-08-22
Payer: COMMERCIAL

## 2023-08-22 NOTE — PROGRESS NOTES
08/22/2023  1:27 PM    Patient Name Aminta Schreiber   Appointment Department [facility] PROP WEIGHT MANAGEMENT  Visit Type Audio (Virtual visit)    Clinic Weights   Wt Readings from Last 3 Encounters:   07/24/23 1022 132.6 kg (292 lb 7 oz)   06/27/23 1649 135.6 kg (299 lb)   06/12/23 1422 135.6 kg (299 lb)     Last Reported Weights No data was found      [facility] INTERVENTION CONTACT:   Method of contact:  Phone Call   or Participant-Initiated Contact?:  -initiated contact  Type of intervention Contact:  Scheduled Session  Did the participant attend session?: No    If no, please select a reason::  Other (please comment) (no-show)  Safety Criteria Triggered:  None  Weight Graph Stoplight Status for Dietary Adherence:  Red Light       Goals         Continue to work on getting back to your regular diet and exercise routine (pt-stated)                        Additional Notes:    3rd missed appointment. Unable to reach patient before next scheduled session.    Emerita Horn, MS, RD, LDN, [facility]  [employer]

## 2023-08-29 ENCOUNTER — PATIENT OUTREACH (OUTPATIENT)
Dept: RESEARCH | Facility: CLINIC | Age: 63
End: 2023-08-29
Payer: COMMERCIAL

## 2023-08-29 NOTE — PROGRESS NOTES
08/29/2023  9:59 AM    Patient Name Aminta Schreiber   Appointment Department [facility]  Visit Type Audio (Virtual visit)    Clinic Weights   Wt Readings from Last 3 Encounters:   07/24/23 1022 132.6 kg (292 lb 7 oz)   06/27/23 1649 135.6 kg (299 lb)   06/12/23 1422 135.6 kg (299 lb)     Last Reported Weights No data was found      [facility] PROP INTERVENTION CONTACT:   Method of contact:  Phone Call   or Participant-Initiated Contact?:  -initiated contact  Type of intervention Contact:  Scheduled Session  Did the participant attend session?: Yes    Was the patient called within 15 minutes of the scheduled appointment?:  Yes  Is this a make-up session?: No    Patient Reported Weight (From External Nancy):  285.71 (wt from 8/22)  Patient-reported weekly minutes of physical activity::  0  Average daily steps per day via Fitbit or activity tracker measurement::  0  Calorie Prescription::  1400  Safety Criteria Triggered:  None  Toolbox Triggered:  Attendance and Adherence to diet  Adherence to diet: Health  must choose at least two interventions from list::  Other (please comment) (Patient not willing to discuss goals)  Attendance::  Other (please comment) (patient not willing to discuss goals)  Weight Graph Stoplight Status for Dietary Adherence:  Red Light       Goals         Continue to work on getting back to your regular diet and exercise routine (pt-stated)                        Additional Notes:    Patient states that she has a lot going on and does not want to continue participating in the study. Offered options to pause or have less frequent visits, but she declined both options. When I asked what she thought might work, she states that she has not thought about it and agreed to think about her options and let me know next week.    Emerita Horn, MS, RD, LDN, [facility]  [facility]   [phone]

## 2023-09-19 ENCOUNTER — PATIENT MESSAGE (OUTPATIENT)
Dept: RESEARCH | Facility: CLINIC | Age: 63
End: 2023-09-19

## 2023-10-21 ENCOUNTER — PATIENT MESSAGE (OUTPATIENT)
Dept: PRIMARY CARE CLINIC | Facility: CLINIC | Age: 63
End: 2023-10-21
Payer: COMMERCIAL

## 2023-10-21 DIAGNOSIS — E11.9 TYPE 2 DIABETES MELLITUS WITHOUT COMPLICATION, WITHOUT LONG-TERM CURRENT USE OF INSULIN: ICD-10-CM

## 2023-10-21 NOTE — TELEPHONE ENCOUNTER
Refill Encounter    PCP Visits: Recent Visits  Date Type Provider Dept   07/24/23 Office Visit Verenice Chase,  [facility]   04/24/23 Office Visit Verenice Chase,  [facility]   Showing recent visits within past 360 days and meeting all other requirements  Future Appointments  Date Type Provider Dept   01/24/24 Appointment Verenice Chase,  [facility]   Showing future appointments within next 720 days and meeting all other requirements     Last 3 Blood Pressure:   BP Readings from Last 3 Encounters:   07/24/23 132/62   06/27/23 137/65   06/12/23 (!) 141/63     Preferred Pharmacy:   [place], [place] - [address] [place] [address]   [address] [facility] [place] [address]  Phone: [phone] Fax: [phone]    [id number] [place] - [address]  [address] [address]  [place] [address]  Phone: [phone] Fax: [phone]    Requested RX:  Requested Prescriptions     Pending Prescriptions Disp Refills    metFORMIN (GLUCOPHAGE-XR) 500 MG ER 24hr tablet 180 tablet 3     Sig: Take 2 tablets (1,000 mg total) by mouth once daily.      RX Route: Normal

## 2023-10-23 RX ORDER — METFORMIN HYDROCHLORIDE 500 MG/1
1000 TABLET, EXTENDED RELEASE ORAL DAILY
Qty: 180 TABLET | Refills: 3 | Status: SHIPPED | OUTPATIENT
Start: 2023-10-23 | End: 2024-10-22

## 2023-11-06 ENCOUNTER — PATIENT MESSAGE (OUTPATIENT)
Dept: UROLOGY | Facility: CLINIC | Age: 63
End: 2023-11-06
Payer: COMMERCIAL

## 2023-11-07 DIAGNOSIS — R10.9 RIGHT FLANK PAIN: Primary | ICD-10-CM

## 2023-11-07 DIAGNOSIS — Z87.442 HISTORY OF NEPHROLITHIASIS: ICD-10-CM

## 2023-11-10 ENCOUNTER — TELEPHONE (OUTPATIENT)
Dept: UROLOGY | Facility: CLINIC | Age: 63
End: 2023-11-10
Payer: COMMERCIAL

## 2023-11-10 NOTE — TELEPHONE ENCOUNTER
----- Message from Kendra Batista NP sent at 11/10/2023  3:45 PM CST -----  Please inform patient via telephone that her CT RSS did not show any stones in urinary tract. Patient can submit a urine specimen to rule out UTI for possible cause of back pain. Please arrange. Thanks.

## 2023-11-16 ENCOUNTER — PATIENT MESSAGE (OUTPATIENT)
Dept: RESEARCH | Facility: CLINIC | Age: 63
End: 2023-11-16
Payer: COMMERCIAL

## 2024-01-02 NOTE — TELEPHONE ENCOUNTER
Care Due:                  Date            Visit Type   Department     Provider  --------------------------------------------------------------------------------                                EP -                              PRIMARY      [facility] PR[facility]  Last Visit: 07-      CARE ([facility])   NIK Chase                              EP -                              PRIMARY      [facility] PRIMARY  Next Visit: 01-      CARE ([facility])   NIK Chase                                                            Last  Test          Frequency    Reason                     Performed    Due Date  --------------------------------------------------------------------------------    HBA1C.......  6 months...  SITagliptin, metFORMIN...  07- 01-    [facility] Embedded Care Due Messages. Reference number: [id number].   10/21/2023 11:22:53 AM CDT   No

## 2024-01-19 ENCOUNTER — PATIENT MESSAGE (OUTPATIENT)
Dept: PRIMARY CARE CLINIC | Facility: CLINIC | Age: 64
End: 2024-01-19
Payer: COMMERCIAL

## 2024-02-07 ENCOUNTER — TELEPHONE (OUTPATIENT)
Dept: PRIMARY CARE CLINIC | Facility: CLINIC | Age: 64
End: 2024-02-07
Payer: COMMERCIAL

## 2024-02-07 NOTE — TELEPHONE ENCOUNTER
----- Message from Verenice Chase DO sent at 2/7/2024 10:03 AM CST -----  Regarding: lasb prior to visit  Please remind Aminta Schreiber to have labs prior to visit 2/12/24. She missed labs 1/22/24 and visit 1/24/24.     If she would like to convert to telemed visit, okay.     Dr. Verenice Chase D.O.   [facility]

## 2024-02-08 ENCOUNTER — PATIENT MESSAGE (OUTPATIENT)
Dept: PRIMARY CARE CLINIC | Facility: CLINIC | Age: 64
End: 2024-02-08
Payer: COMMERCIAL

## 2024-03-03 ENCOUNTER — PATIENT MESSAGE (OUTPATIENT)
Dept: UROLOGY | Facility: CLINIC | Age: 64
End: 2024-03-03
Payer: COMMERCIAL

## 2024-03-04 ENCOUNTER — PATIENT MESSAGE (OUTPATIENT)
Dept: PRIMARY CARE CLINIC | Facility: CLINIC | Age: 64
End: 2024-03-04
Payer: COMMERCIAL

## 2024-03-04 RX ORDER — TAMSULOSIN HYDROCHLORIDE 0.4 MG/1
0.4 CAPSULE ORAL DAILY
Qty: 30 CAPSULE | Refills: 11 | Status: SHIPPED | OUTPATIENT
Start: 2024-03-04 | End: 2025-03-04

## 2024-03-16 ENCOUNTER — PATIENT MESSAGE (OUTPATIENT)
Dept: PRIMARY CARE CLINIC | Facility: CLINIC | Age: 64
End: 2024-03-16
Payer: COMMERCIAL

## 2024-03-18 ENCOUNTER — PATIENT MESSAGE (OUTPATIENT)
Dept: PRIMARY CARE CLINIC | Facility: CLINIC | Age: 64
End: 2024-03-18
Payer: COMMERCIAL

## 2024-03-26 ENCOUNTER — OFFICE VISIT (OUTPATIENT)
Dept: PRIMARY CARE CLINIC | Facility: CLINIC | Age: 64
End: 2024-03-26
Attending: FAMILY MEDICINE
Payer: COMMERCIAL

## 2024-03-26 VITALS
HEIGHT: 65 IN | SYSTOLIC BLOOD PRESSURE: 132 MMHG | OXYGEN SATURATION: 99 % | HEART RATE: 87 BPM | BODY MASS INDEX: 45.84 KG/M2 | DIASTOLIC BLOOD PRESSURE: 80 MMHG | WEIGHT: 275.13 LBS

## 2024-03-26 DIAGNOSIS — E11.59 HYPERTENSION ASSOCIATED WITH TYPE 2 DIABETES MELLITUS: ICD-10-CM

## 2024-03-26 DIAGNOSIS — Z86.010 HISTORY OF COLON POLYPS: ICD-10-CM

## 2024-03-26 DIAGNOSIS — E11.9 TYPE 2 DIABETES MELLITUS WITHOUT COMPLICATION, WITHOUT LONG-TERM CURRENT USE OF INSULIN: Primary | ICD-10-CM

## 2024-03-26 DIAGNOSIS — D64.9 NORMOCYTIC ANEMIA: ICD-10-CM

## 2024-03-26 DIAGNOSIS — E11.69 HYPERLIPIDEMIA ASSOCIATED WITH TYPE 2 DIABETES MELLITUS: ICD-10-CM

## 2024-03-26 DIAGNOSIS — I15.2 HYPERTENSION ASSOCIATED WITH TYPE 2 DIABETES MELLITUS: ICD-10-CM

## 2024-03-26 DIAGNOSIS — R91.1 PULMONARY NODULE 1 CM OR GREATER IN DIAMETER: ICD-10-CM

## 2024-03-26 DIAGNOSIS — E78.5 HYPERLIPIDEMIA ASSOCIATED WITH TYPE 2 DIABETES MELLITUS: ICD-10-CM

## 2024-03-26 PROCEDURE — 3008F BODY MASS INDEX DOCD: CPT | Mod: CPTII,S$GLB,, | Performed by: FAMILY MEDICINE

## 2024-03-26 PROCEDURE — 3075F SYST BP GE 130 - 139MM HG: CPT | Mod: CPTII,S$GLB,, | Performed by: FAMILY MEDICINE

## 2024-03-26 PROCEDURE — 3044F HG A1C LEVEL LT 7.0%: CPT | Mod: CPTII,S$GLB,, | Performed by: FAMILY MEDICINE

## 2024-03-26 PROCEDURE — 3061F NEG MICROALBUMINURIA REV: CPT | Mod: CPTII,S$GLB,, | Performed by: FAMILY MEDICINE

## 2024-03-26 PROCEDURE — 99999 PR PBB SHADOW E&M-EST. PATIENT-LVL IV: CPT | Mod: PBBFAC,,, | Performed by: FAMILY MEDICINE

## 2024-03-26 PROCEDURE — 3066F NEPHROPATHY DOC TX: CPT | Mod: CPTII,S$GLB,, | Performed by: FAMILY MEDICINE

## 2024-03-26 PROCEDURE — 3079F DIAST BP 80-89 MM HG: CPT | Mod: CPTII,S$GLB,, | Performed by: FAMILY MEDICINE

## 2024-03-26 PROCEDURE — 99214 OFFICE O/P EST MOD 30 MIN: CPT | Mod: S$GLB,,, | Performed by: FAMILY MEDICINE

## 2024-03-26 PROCEDURE — 1159F MED LIST DOCD IN RCRD: CPT | Mod: CPTII,S$GLB,, | Performed by: FAMILY MEDICINE

## 2024-03-26 RX ORDER — LATANOPROST 50 UG/ML
SOLUTION/ DROPS OPHTHALMIC
COMMUNITY
Start: 2024-03-15

## 2024-03-26 RX ORDER — MOXIFLOXACIN 5 MG/ML
1 SOLUTION/ DROPS OPHTHALMIC 4 TIMES DAILY
COMMUNITY
Start: 2024-02-09

## 2024-03-26 RX ORDER — PROMETHAZINE HYDROCHLORIDE AND DEXTROMETHORPHAN HYDROBROMIDE 6.25; 15 MG/5ML; MG/5ML
5 SYRUP ORAL EVERY 6 HOURS
COMMUNITY
Start: 2023-12-28 | End: 2024-06-07

## 2024-03-26 RX ORDER — BENZONATATE 100 MG/1
100 CAPSULE ORAL 3 TIMES DAILY
COMMUNITY
Start: 2023-12-28 | End: 2024-03-26

## 2024-03-26 NOTE — PROGRESS NOTES
FAMILY MEDICINE  [facility]    Reason for visit:   Chief Complaint   Patient presents with    Diabetes     Follow up          SUBJECTIVE: Aminta Schreiber is a 63 y.o. female  - smoker (started 14 yo 1/2 pack per day= 48 years) obesity, type 2 diabetes, hypertension, hyperlipidemia, Raynaud's, GERD, myelolipoma bilateral adrenal glands, chronic low back pain, history of renal stones, fasciitis with bone spurs and overactive bladder presents to follow-up diabetes, hypertension and labs      Urology Brogle (nephrolithiasis)  Rheumatology: Dr. Live Lacey [facility]  Endocrine: Dr. Scott Arevalo (monitor adrenals and no issues)  Gynecology Dr. Vero Sanz  Ophthalmology: Dr. Mallorie Garcia.   GI: [facility].   Ortho Dr. Escamilla    1. Diabetes Type 2     Age diagnosed: 50's     Current treatment regimen:   metFORMIN (GLUCOPHAGE-XR) 500 MG ER 24hr tablet, Take 2 tablets (1,000 mg total) by mouth once daily., Disp:  SITagliptin phosphate (JANUVIA) 25 MG Tab, Take 1 tablet (25 mg total) by mouth once daily., Disp: 90 tablet, Rfl: 3     Side effects from treatment: dry lips since startingJanuvia  Complications of diabetes: none     Glucometer: yes  Glucose monitoring: PRN  A. Fasting: NA    Lab Results       Component                Value               Date                       HGBA1C                   6.4 (H)             03/15/2024              Lab Results       Component                Value               Date                       HGBA1C                   6.7 (H)             07/20/2023                    Low dose statin: yes  Last eye exam: Dr. Boyer 7/31/23   Last foot exam: 3/26/24      Vaccines:   Influenza: recommend yearly  Pneumovax: 23 7/20/2020  Prevnar 20: declined    2. Hypertension  - DM2, HLD  - BP goal < 140/90     Current medication treatment:   losartan-hydrochlorothiazide 50-12.5 mg (HYZAAR) 50-12.5 mg per tablet, Take 1 tablet by mouth 2 (two) times a day., Disp: 180 tablet, Rfl: 3  - she  prefers BID dosing    Prior:   losartan-hydrochlorothiazide 100-25 mg (HYZAAR) 100-25 mg per tablet, Take 1 tablet by mouth once daily  - she did not like it     Medication side effects: denies     3. Hyperlipidemia  - diabetes, hypertension and hyperlipidemia  - LDL goal < 100    Current treatment:  rosuvastatin (CRESTOR) 10 MG tablet, Take 1 tablet (10 mg total) by mouth every evening., Disp: 90 tablet, Rfl: 2    Side effects from current treatment: none    Lab Results       Component                Value               Date                       CHOL                     142                 03/15/2024                 CHOL                     137                 04/17/2023                 CHOL                     129                 03/25/2022            Lab Results       Component                Value               Date                       HDL                      45                  03/15/2024                 HDL                      48                  04/17/2023                 HDL                      45                  03/25/2022            Lab Results       Component                Value               Date                       LDLCALC                  86.0                03/15/2024                 LDLCALC                  78.4                04/17/2023                 LDLCALC                  75.0                03/25/2022            Lab Results       Component                Value               Date                       TRIG                     55                  03/15/2024                 TRIG                     53                  04/17/2023                 TRIG                     45                  03/25/2022              Lab Results       Component                Value               Date                       CHOLHDL                  31.7                03/15/2024                 CHOLHDL                  35.0                04/17/2023                 CHOLHDL                  34.9                 03/25/2022                           Review of Systems   All other systems reviewed and are negative.      HEALTH MAINTENANCE:   Health Maintenance   Topic Date Due    Colorectal Cancer Screening  04/14/2022    Shingles Vaccine (2 of 2) 09/27/2023    LDCT Lung Screen  07/20/2024    Eye Exam  07/31/2024    Mammogram  09/13/2024    Hemoglobin A1c  09/15/2024    Lipid Panel  03/15/2025    Foot Exam  03/26/2025    TETANUS VACCINE  08/02/2033    Hepatitis C Screening  Completed     Health Maintenance Topics with due status: Not Due       Topic Last Completion Date    LDCT Lung Screen 07/20/2023    Eye Exam 07/31/2023    TETANUS VACCINE 08/02/2023    Mammogram 09/13/2023    Diabetes Urine Screening 03/15/2024    Lipid Panel 03/15/2024    Hemoglobin A1c 03/15/2024    Foot Exam 03/26/2024     Health Maintenance Due   Topic Date Due    RSV Vaccine (Age 60+ and Pregnant patients) (1 - 1-dose 60+ series) Never done    Colorectal Cancer Screening  04/14/2022    Influenza Vaccine (1) 09/01/2023    COVID-19 Vaccine (6 - 2023-24 season) 09/01/2023    Shingles Vaccine (2 of 2) 09/27/2023       HISTORY:   Past Medical History:   Diagnosis Date    Adrenal adenoma, left 12/14/2021    Atopic dermatitis 10/11/2022    Diabetes mellitus, type 2     GERD (gastroesophageal reflux disease)     Gross hematuria 04/30/2019    H/O left breast biopsy     Hyperlipidemia     Hypertension     Kidney stone     Migraines     Myelolipoma of left adrenal gland 7/24/2018    - benign and followed by Dr. Arevalo - 12/14/21 CT abd: Bilateral fat attenuation adrenal masses consistent with benign myelolipomas,, 4.1 cm on the right and 5.0 cm on the left.    Nephrolithiasis 07/24/2018    Personal history of colonic polyps 01/15/2014    Raynaud's disease     Renal calculus, right 11/23/2015    Renal colic on right side 11/23/2015    Right ureteral calculus 04/30/2019    Urinary tract infection     Vaginal infection        Past Surgical History:   Procedure  "Laterality Date    BREAST CYST EXCISION      EXTRACORPOREAL SHOCK WAVE LITHOTRIPSY Right 08/09/2018    Procedure: LITHOTRIPSY, ESWL;  Surgeon: Milton Garland MD;  Location: [facility] OR;  Service: Urology;  Laterality: Right;    EXTRACORPOREAL SHOCK WAVE LITHOTRIPSY Right 05/02/2019    Procedure: LITHOTRIPSY, ESWL;  Surgeon: Milton Garland MD;  Location: [facility] OR;  Service: Urology;  Laterality: Right;    EXTRACORPOREAL SHOCK WAVE LITHOTRIPSY Right 07/25/2019    Procedure: LITHOTRIPSY, ESWL;  Surgeon: Milton Garland MD;  Location: [facility] OR;  Service: Urology;  Laterality: Right;    HYSTERECTOMY      Partial    LIPOMA RESECTION Right 06/07/2022    Procedure: EXCISION, LIPOMA;  Surgeon: Odell Escamilla Jr., MD;  Location: [facility] OR;  Service: Orthopedics;  Laterality: Right;  include nerve exploration    LITHOTRIPSY      x 3 or 4 per pt       Family History   Problem Relation Age of Onset    Diabetes Mother     Hypertension Mother     Cancer Father     Hypertension Father     No Known Problems Brother     Migraines Daughter     No Known Problems Son     Hypertension Son     Cancer Son     Early death Paternal Grandmother     Hypertension Maternal Aunt     Kidney disease Neg Hx        Social History     Tobacco Use    Smoking status: Every Day     Current packs/day: 0.50     Average packs/day: 0.5 packs/day for 51.0 years (25.5 ttl pk-yrs)     Types: Cigarettes     Start date: 4/1/1973     Passive exposure: Current    Smokeless tobacco: Never    Tobacco comments:     Still smoking   Substance Use Topics    Alcohol use: Yes     Alcohol/week: 1.0 standard drink of alcohol     Types: 1 Standard drinks or equivalent per week     Comment: 'on occasion"    Drug use: Never       Social History     Social History Narrative    .  Three children.  Works at [employer]       ALLERGIES:   Review of patient's allergies indicates:  No Known Allergies    MEDS:   Current Outpatient Medications on File Prior to Visit   Medication Sig " Dispense Refill Last Dose    ascorbic acid, vitamin C, (VITAMIN C) 1000 MG tablet Take 1,000 mg by mouth once daily.   Taking    cholecalciferol, vitamin D3, (VITAMIN D3) 50 mcg (2,000 unit) Cap Take 1 capsule (2,000 Units total) by mouth once daily. 90 capsule 2 Taking    co-enzyme Q-10 30 mg capsule Take 30 mg by mouth once daily.   Taking    cyanocobalamin (VITAMIN B-12) 1000 MCG tablet Take 100 mcg by mouth once daily.   Taking    ferrous sulfate (FEOSOL) 325 mg (65 mg iron) Tab tablet Take 1 tablet by mouth once daily.   Taking    fluticasone propionate (FLONASE) 50 mcg/actuation nasal spray 1 spray (50 mcg total) by Each Nostril route once daily. 16 g 0 Taking    folic acid (FOLVITE) 1 MG tablet Take 1 tablet (1,000 mcg total) by mouth once daily. 90 tablet 3 Taking    latanoprost 0.005 % ophthalmic solution    Taking    metFORMIN (GLUCOPHAGE-XR) 500 MG ER 24hr tablet Take 2 tablets (1,000 mg total) by mouth once daily. 180 tablet 3 Taking    mirabegron (MYRBETRIQ) 50 mg Tb24 Take 1 tablet (50 mg total) by mouth once daily. 30 tablet 11 Taking    moxifloxacin (VIGAMOX) 0.5 % ophthalmic solution Place 1 drop into both eyes 4 (four) times daily.   Taking    omeprazole (PRILOSEC) 40 MG capsule Take 1 capsule (40 mg total) by mouth every morning. 90 capsule 3 Taking    promethazine-dextromethorphan (PROMETHAZINE-DM) 6.25-15 mg/5 mL Syrp Take 5 mLs by mouth every 6 (six) hours.   Taking    rosuvastatin (CRESTOR) 10 MG tablet TAKE 1 TABLET BY MOUTH ONCE DAILY IN THE EVENING 90 tablet 3 Taking    SITagliptin phosphate (JANUVIA) 25 MG Tab Take 1 tablet (25 mg total) by mouth once daily. 90 tablet 3 Taking    tamsulosin (FLOMAX) 0.4 mg Cap Take 1 capsule (0.4 mg total) by mouth once daily. 30 capsule 11 Taking    valACYclovir (VALTREX) 1000 MG tablet Take 1 tablet by mouth once daily.   Taking    losartan-hydrochlorothiazide 50-12.5 mg (HYZAAR) 50-12.5 mg per tablet Take 1 tablet by mouth 2 (two) times a day. 180  "tablet 3     [DISCONTINUED] ammonium lactate 12 % Crea Apply 1 application topically 2 (two) times daily. (Patient not taking: Reported on 2023) 140 g 2     [DISCONTINUED] amoxicillin-clavulanate 875-125mg (AUGMENTIN) 875-125 mg per tablet Take 1 tablet by mouth 2 (two) times daily.       [DISCONTINUED] benzonatate (TESSALON) 100 MG capsule Take 100 mg by mouth 3 (three) times daily.   Not Taking    [DISCONTINUED] ciclopirox (PENLAC) 8 % Soln Apply topically nightly. (Patient not taking: Reported on 2023) 6.6 mL 11     [DISCONTINUED] clotrimazole-betamethasone 1-0.05% (LOTRISONE) cream Apply 1 g topically 2 (two) times daily. apply to affected area       [DISCONTINUED] ketorolac (TORADOL) 10 mg tablet Take 10 mg by mouth every 6 (six) hours.       [DISCONTINUED] naproxen (NAPROSYN) 500 MG tablet Take 1 tablet (500 mg total) by mouth 2 (two) times daily with meals. 30 tablet 1     [DISCONTINUED] triamcinolone acetonide 0.1% (KENALOG) 0.1 % cream triamcinolone acetonide 0.1 % topical cream       [DISCONTINUED] triamcinolone acetonide 0.1% (KENALOG) 0.1 % cream SMARTSI Application Topical 2-3 Times Daily       [DISCONTINUED] valACYclovir (VALTREX) 1000 MG tablet Take 1,000 mg by mouth once daily.             Vital signs:   Vitals:    24 1454   BP: 132/80   BP Location: Left arm   Patient Position: Sitting   BP Method: Large (Manual)   Pulse: 87   SpO2: 99%   Weight: 124.8 kg (275 lb 2.2 oz)   Height: 5' 5" (1.651 m)     Body mass index is 45.78 kg/m².    PHYSICAL EXAM:     Physical Exam  Vitals reviewed.   Constitutional:       General: She is not in acute distress.  HENT:      Head: Normocephalic and atraumatic.      Right Ear: Tympanic membrane and ear canal normal.      Left Ear: Tympanic membrane and ear canal normal.   Eyes:      General: No scleral icterus.     Conjunctiva/sclera: Conjunctivae normal.   Neck:      Thyroid: No thyromegaly.      Vascular: No carotid bruit.   Cardiovascular:      " Rate and Rhythm: Normal rate and regular rhythm.      Pulses: Normal pulses.           Dorsalis pedis pulses are 2+ on the right side and 2+ on the left side.        Posterior tibial pulses are 2+ on the right side and 2+ on the left side.      Heart sounds: Normal heart sounds. No murmur heard.     No friction rub. No gallop.   Pulmonary:      Effort: Pulmonary effort is normal.      Breath sounds: Normal breath sounds. No wheezing, rhonchi or rales.   Abdominal:      General: Bowel sounds are normal. There is no distension.      Palpations: Abdomen is soft.      Tenderness: There is no abdominal tenderness.   Musculoskeletal:      Cervical back: Normal range of motion and neck supple.      Right lower leg: No edema.      Left lower leg: No edema.   Feet:      Right foot:      Protective Sensation: 5 sites tested.  5 sites sensed.      Skin integrity: Skin integrity normal.      Left foot:      Protective Sensation: 5 sites tested.  5 sites sensed.      Skin integrity: Skin integrity normal.   Lymphadenopathy:      Cervical: No cervical adenopathy.   Skin:     General: Skin is warm.      Capillary Refill: Capillary refill takes less than 2 seconds.   Neurological:      Mental Status: She is alert.             PERTINENT RESULTS:   No visits with results within 1 Week(s) from this visit.   Latest known visit with results is:   Lab Visit on 03/15/2024   Component Date Value Ref Range Status    Cholesterol 03/15/2024 142  120 - 199 mg/dL Final    Comment: The National Cholesterol Education Program (NCEP) has set the  following guidelines (reference ranges) for Cholesterol:  Optimal.....................<200 mg/dL  Borderline High.............200-239 mg/dL  High........................> or = 240 mg/dL      Triglycerides 03/15/2024 55  30 - 150 mg/dL Final    Comment: The National Cholesterol Education Program (NCEP) has set the  following guidelines (reference values) for triglycerides:  Normal......................<150  mg/dL  Borderline High.............150-199 mg/dL  High........................200-499 mg/dL      HDL 03/15/2024 45  40 - 75 mg/dL Final    Comment: The National Cholesterol Education Program (NCEP) has set the  following guidelines (reference values) for HDL Cholesterol:  Low...............<40 mg/dL  Optimal...........>60 mg/dL      LDL Cholesterol 03/15/2024 86.0  63.0 - 159.0 mg/dL Final    Comment: The National Cholesterol Education Program (NCEP) has set the  following guidelines (reference values) for LDL Cholesterol:  Optimal.......................<130 mg/dL  Borderline High...............130-159 mg/dL  High..........................160-189 mg/dL  Very High.....................>190 mg/dL      HDL/Cholesterol Ratio 03/15/2024 31.7  20.0 - 50.0 % Final    Total Cholesterol/HDL Ratio 03/15/2024 3.2  2.0 - 5.0 Final    Non-HDL Cholesterol 03/15/2024 97  mg/dL Final    Comment: Risk category and Non-HDL cholesterol goals:  Coronary heart disease (CHD)or equivalent (10-year risk of CHD >20%):  Non-HDL cholesterol goal     <130 mg/dL  Two or more CHD risk factors and 10-year risk of CHD <= 20%:  Non-HDL cholesterol goal     <160 mg/dL  0 to 1 CHD risk factor:  Non-HDL cholesterol goal     <190 mg/dL      Hemoglobin A1C 03/15/2024 6.4 (H)  4.0 - 5.6 % Final    Comment: ADA Screening Guidelines:  5.7-6.4%  Consistent with prediabetes  >or=6.5%  Consistent with diabetes    High levels of fetal hemoglobin interfere with the HbA1C  assay. Heterozygous hemoglobin variants (HbS, HgC, etc)do  not significantly interfere with this assay.   However, presence of multiple variants may affect accuracy.      Estimated Avg Glucose 03/15/2024 137 (H)  68 - 131 mg/dL Final    Microalbumin, Urine 03/15/2024 22.0  ug/mL Final    Creatinine, Urine 03/15/2024 254.0  15.0 - 325.0 mg/dL Final    Microalb/Creat Ratio 03/15/2024 8.7  0.0 - 30.0 ug/mg Final    Sodium 03/15/2024 146 (H)  136 - 145 mmol/L Final    Potassium 03/15/2024 4.0  3.5  - 5.1 mmol/L Final    Chloride 03/15/2024 104  95 - 110 mmol/L Final    CO2 03/15/2024 33 (H)  23 - 29 mmol/L Final    Glucose 03/15/2024 125 (H)  70 - 110 mg/dL Final    BUN 03/15/2024 9  7 - 17 mg/dL Final    Creatinine 03/15/2024 0.76  0.50 - 1.40 mg/dL Final    Calcium 03/15/2024 8.7  8.7 - 10.5 mg/dL Final    Total Protein 03/15/2024 7.0  6.0 - 8.4 g/dL Final    Albumin 03/15/2024 3.9  3.5 - 5.2 g/dL Final    Total Bilirubin 03/15/2024 0.4  0.1 - 1.0 mg/dL Final    Comment: For infants and newborns, interpretation of results should be based  on gestational age, weight and in agreement with clinical  observations.    Premature Infant recommended reference ranges:  Up to 24 hours.............<8.0 mg/dL  Up to 48 hours............<12.0 mg/dL  3-5 days..................<15.0 mg/dL  6-29 days.................<15.0 mg/dL      Alkaline Phosphatase 03/15/2024 88  38 - 126 U/L Final    AST 03/15/2024 23  15 - 46 U/L Final    ALT 03/15/2024 20  10 - 44 U/L Final    Anion Gap 03/15/2024 9  8 - 16 mmol/L Final    eGFR 03/15/2024 >60.0  >60 mL/min/1.73 m^2 Final    WBC 03/15/2024 5.32  3.90 - 12.70 K/uL Final    RBC 03/15/2024 4.02  4.00 - 5.40 M/uL Final    Hemoglobin 03/15/2024 11.5 (L)  12.0 - 16.0 g/dL Final    Hematocrit 03/15/2024 35.8 (L)  37.0 - 48.5 % Final    MCV 03/15/2024 89  82 - 98 fL Final    MCH 03/15/2024 28.6  27.0 - 31.0 pg Final    MCHC 03/15/2024 32.1  32.0 - 36.0 g/dL Final    RDW 03/15/2024 14.1  11.5 - 14.5 % Final    Platelets 03/15/2024 284  150 - 450 K/uL Final    MPV 03/15/2024 10.2  9.2 - 12.9 fL Final    Immature Granulocytes 03/15/2024 0.4  0.0 - 0.5 % Final    Gran # (ANC) 03/15/2024 2.8  1.8 - 7.7 K/uL Final    Immature Grans (Abs) 03/15/2024 0.02  0.00 - 0.04 K/uL Final    Comment: Mild elevation in immature granulocytes is non specific and   can be seen in a variety of conditions including stress response,   acute inflammation, trauma and pregnancy. Correlation with other   laboratory and  clinical findings is essential.      Lymph # 03/15/2024 2.1  1.0 - 4.8 K/uL Final    Mono # 03/15/2024 0.4  0.3 - 1.0 K/uL Final    Eos # 03/15/2024 0.1  0.0 - 0.5 K/uL Final    Baso # 03/15/2024 0.02  0.00 - 0.20 K/uL Final    nRBC 03/15/2024 0  0 /100 WBC Final    Gran % 03/15/2024 51.6  38.0 - 73.0 % Final    Lymph % 03/15/2024 39.5  18.0 - 48.0 % Final    Mono % 03/15/2024 6.6  4.0 - 15.0 % Final    Eosinophil % 03/15/2024 1.5  0.0 - 8.0 % Final    Basophil % 03/15/2024 0.4  0.0 - 1.9 % Final    Differential Method 03/15/2024 Automated   Final       ASSESSMENT/PLAN:    1. Type 2 diabetes mellitus without complication, without long-term current use of insulin  Overview:  Lab Results   Component Value Date    HGBA1C 6.7 (H) 07/20/2023     - well controlled  - continue current management plan   - patient encouraged to notify me with any changes    Orders:  -     Hemoglobin A1C; Future; Expected date: 09/26/2024    2. Hypertension associated with type 2 diabetes mellitus  Overview:  - well controlled  - continue current medications  - counseling on taking medication at the same time BID    Orders:  -     Hemoglobin A1C; Future; Expected date: 09/26/2024  -     Comprehensive Metabolic Panel; Future; Expected date: 09/26/2024    3. Hyperlipidemia associated with type 2 diabetes mellitus  Overview:  Lab Results   Component Value Date    LDLCALC 78.4 04/17/2023     - well controlled  - continue current medication    Orders:  -     Lipid Panel; Future; Expected date: 09/26/2024  -     Comprehensive Metabolic Panel; Future; Expected date: 09/26/2024    4. Normocytic anemia  Overview:  Lab Results   Component Value Date    WBC 5.32 03/15/2024    HGB 11.5 (L) 03/15/2024    HCT 35.8 (L) 03/15/2024    MCV 89 03/15/2024     03/15/2024     - asymptomatic   -chronic and stable    Orders:  -     CBC Auto Differential; Future; Expected date: 09/26/2024    5. Pulmonary nodule 1 cm or greater in diameter  Overview:  -  12/14/21 LDCT: 12 mm pleural base nodule versus noncalcified pleural plaque right middle lobe.  Lung-RADS Category:  4A - Suspicious - consultation advised - possible next steps 3 month LDCT -in some scenarios PET/CT  - 3/15/22 LDCT: Lung-RADS Category:  3 - Probably Benign- 6 month LDCT.  The right middle lobe, solid, peripheral nodule (11.7 x 3.6 mm) with average diameter of 7.65 mm requires additional evaluation.  Recommend six-month follow-up CT following initial detection.  - 7/20/23 CT chest: Bilateral solid pulmonary nodules demonstrate 18 months of stability suggesting benign etiology.  Follow-up low-dose chest CT in 12 months is recommended.  - next due 7/2024    Orders:  -     CT Chest Without Contrast; Future; Expected date: 07/20/2024    6. History of colon polyps  Overview:  - 4/25/2017 colonoscopy normal  - prior polps and GI recommendation to continue to repeat 5 years  - overdue for colonoscopy and discussed importance with Aminta Schreiber   - encouraged her to schedule her colonoscopy as soon as possible            ORDERS:   Orders Placed This Encounter    CT Chest Without Contrast    Lipid Panel    Hemoglobin A1C    Comprehensive Metabolic Panel    CBC Auto Differential       Vaccines recommended:  RSV, influenza, COVID-19 and shingles    Follow up in about 6 months (around 9/26/2024) for diabetes, Labs. or sooner with any concerns        This note is dictated using the M*Modal Fluency Direct word recognition program. There are word recognition mistakes that are occasionally missed on review.    Dr. Verenice Chase D.O.   [facility]

## 2024-03-28 DIAGNOSIS — R35.0 URINARY FREQUENCY: ICD-10-CM

## 2024-03-28 DIAGNOSIS — N39.41 URGE URINARY INCONTINENCE: ICD-10-CM

## 2024-03-28 DIAGNOSIS — R39.15 URINARY URGENCY: ICD-10-CM

## 2024-03-28 DIAGNOSIS — N32.81 OVERACTIVE BLADDER: ICD-10-CM

## 2024-03-28 DIAGNOSIS — R35.1 NOCTURIA: ICD-10-CM

## 2024-03-28 RX ORDER — MIRABEGRON 50 MG/1
1 TABLET, FILM COATED, EXTENDED RELEASE ORAL
Qty: 30 TABLET | Refills: 0 | Status: SHIPPED | OUTPATIENT
Start: 2024-03-28 | End: 2024-05-25 | Stop reason: SDUPTHER

## 2024-05-13 ENCOUNTER — TELEPHONE (OUTPATIENT)
Dept: FAMILY MEDICINE | Facility: CLINIC | Age: 64
End: 2024-05-13
Payer: COMMERCIAL

## 2024-05-13 NOTE — TELEPHONE ENCOUNTER
Called pt; Pt states she wants to est care with Dr. Sharpe; Pt has been advise of date and times; Pt accepted for the month of June; Pt appt has been scheduled to est care

## 2024-05-25 ENCOUNTER — PATIENT MESSAGE (OUTPATIENT)
Dept: UROLOGY | Facility: CLINIC | Age: 64
End: 2024-05-25
Payer: COMMERCIAL

## 2024-05-25 DIAGNOSIS — N32.81 OVERACTIVE BLADDER: ICD-10-CM

## 2024-05-25 DIAGNOSIS — N39.41 URGE URINARY INCONTINENCE: ICD-10-CM

## 2024-05-25 DIAGNOSIS — R35.1 NOCTURIA: ICD-10-CM

## 2024-05-25 DIAGNOSIS — R35.0 URINARY FREQUENCY: ICD-10-CM

## 2024-05-25 DIAGNOSIS — R39.15 URINARY URGENCY: ICD-10-CM

## 2024-05-27 RX ORDER — MIRABEGRON 50 MG/1
1 TABLET, EXTENDED RELEASE ORAL DAILY
Qty: 90 TABLET | Refills: 3 | Status: SHIPPED | OUTPATIENT
Start: 2024-05-27 | End: 2025-05-27

## 2024-06-07 ENCOUNTER — OFFICE VISIT (OUTPATIENT)
Dept: FAMILY MEDICINE | Facility: CLINIC | Age: 64
End: 2024-06-07
Payer: MEDICARE

## 2024-06-07 VITALS
OXYGEN SATURATION: 100 % | HEART RATE: 74 BPM | WEIGHT: 275.13 LBS | DIASTOLIC BLOOD PRESSURE: 68 MMHG | HEIGHT: 65 IN | SYSTOLIC BLOOD PRESSURE: 126 MMHG | BODY MASS INDEX: 45.84 KG/M2 | TEMPERATURE: 99 F

## 2024-06-07 DIAGNOSIS — Z00.00 ENCOUNTER FOR MEDICAL EXAMINATION TO ESTABLISH CARE: ICD-10-CM

## 2024-06-07 DIAGNOSIS — E78.5 HYPERLIPIDEMIA ASSOCIATED WITH TYPE 2 DIABETES MELLITUS: ICD-10-CM

## 2024-06-07 DIAGNOSIS — E11.59 HYPERTENSION ASSOCIATED WITH TYPE 2 DIABETES MELLITUS: ICD-10-CM

## 2024-06-07 DIAGNOSIS — Z12.12 ENCOUNTER FOR COLORECTAL CANCER SCREENING: ICD-10-CM

## 2024-06-07 DIAGNOSIS — J31.0 CHRONIC RHINITIS: ICD-10-CM

## 2024-06-07 DIAGNOSIS — E11.69 HYPERLIPIDEMIA ASSOCIATED WITH TYPE 2 DIABETES MELLITUS: ICD-10-CM

## 2024-06-07 DIAGNOSIS — E66.01 CLASS 3 SEVERE OBESITY DUE TO EXCESS CALORIES WITH SERIOUS COMORBIDITY AND BODY MASS INDEX (BMI) OF 45.0 TO 49.9 IN ADULT: ICD-10-CM

## 2024-06-07 DIAGNOSIS — G57.12 MERALGIA PARAESTHETICA, LEFT: ICD-10-CM

## 2024-06-07 DIAGNOSIS — E55.9 VITAMIN D DEFICIENCY: ICD-10-CM

## 2024-06-07 DIAGNOSIS — L30.9 DERMATITIS OF LIP: ICD-10-CM

## 2024-06-07 DIAGNOSIS — I15.2 HYPERTENSION ASSOCIATED WITH TYPE 2 DIABETES MELLITUS: ICD-10-CM

## 2024-06-07 DIAGNOSIS — E11.9 TYPE 2 DIABETES MELLITUS WITHOUT COMPLICATION, WITHOUT LONG-TERM CURRENT USE OF INSULIN: ICD-10-CM

## 2024-06-07 DIAGNOSIS — Z12.11 ENCOUNTER FOR COLORECTAL CANCER SCREENING: ICD-10-CM

## 2024-06-07 PROCEDURE — 3044F HG A1C LEVEL LT 7.0%: CPT | Mod: CPTII,S$GLB,, | Performed by: STUDENT IN AN ORGANIZED HEALTH CARE EDUCATION/TRAINING PROGRAM

## 2024-06-07 PROCEDURE — 3008F BODY MASS INDEX DOCD: CPT | Mod: CPTII,S$GLB,, | Performed by: STUDENT IN AN ORGANIZED HEALTH CARE EDUCATION/TRAINING PROGRAM

## 2024-06-07 PROCEDURE — 1159F MED LIST DOCD IN RCRD: CPT | Mod: CPTII,S$GLB,, | Performed by: STUDENT IN AN ORGANIZED HEALTH CARE EDUCATION/TRAINING PROGRAM

## 2024-06-07 PROCEDURE — 3061F NEG MICROALBUMINURIA REV: CPT | Mod: CPTII,S$GLB,, | Performed by: STUDENT IN AN ORGANIZED HEALTH CARE EDUCATION/TRAINING PROGRAM

## 2024-06-07 PROCEDURE — 99205 OFFICE O/P NEW HI 60 MIN: CPT | Mod: S$GLB,,, | Performed by: STUDENT IN AN ORGANIZED HEALTH CARE EDUCATION/TRAINING PROGRAM

## 2024-06-07 PROCEDURE — 3078F DIAST BP <80 MM HG: CPT | Mod: CPTII,S$GLB,, | Performed by: STUDENT IN AN ORGANIZED HEALTH CARE EDUCATION/TRAINING PROGRAM

## 2024-06-07 PROCEDURE — 1160F RVW MEDS BY RX/DR IN RCRD: CPT | Mod: CPTII,S$GLB,, | Performed by: STUDENT IN AN ORGANIZED HEALTH CARE EDUCATION/TRAINING PROGRAM

## 2024-06-07 PROCEDURE — 99999 PR PBB SHADOW E&M-EST. PATIENT-LVL IV: CPT | Mod: PBBFAC,,, | Performed by: STUDENT IN AN ORGANIZED HEALTH CARE EDUCATION/TRAINING PROGRAM

## 2024-06-07 PROCEDURE — 3074F SYST BP LT 130 MM HG: CPT | Mod: CPTII,S$GLB,, | Performed by: STUDENT IN AN ORGANIZED HEALTH CARE EDUCATION/TRAINING PROGRAM

## 2024-06-07 PROCEDURE — 3066F NEPHROPATHY DOC TX: CPT | Mod: CPTII,S$GLB,, | Performed by: STUDENT IN AN ORGANIZED HEALTH CARE EDUCATION/TRAINING PROGRAM

## 2024-06-07 RX ORDER — LOSARTAN POTASSIUM AND HYDROCHLOROTHIAZIDE 12.5; 5 MG/1; MG/1
1 TABLET ORAL 2 TIMES DAILY
Qty: 180 TABLET | Refills: 3 | Status: SHIPPED | OUTPATIENT
Start: 2024-06-07 | End: 2025-06-07

## 2024-06-07 RX ORDER — METFORMIN HYDROCHLORIDE 500 MG/1
1000 TABLET, EXTENDED RELEASE ORAL DAILY
Qty: 180 TABLET | Refills: 3 | Status: SHIPPED | OUTPATIENT
Start: 2024-06-07 | End: 2025-06-07

## 2024-06-07 RX ORDER — ROSUVASTATIN CALCIUM 10 MG/1
10 TABLET, COATED ORAL NIGHTLY
Qty: 90 TABLET | Refills: 3 | Status: SHIPPED | OUTPATIENT
Start: 2024-06-07

## 2024-06-07 NOTE — PROGRESS NOTES
[facility] Note    Chief Complaint      Chief Complaint   Patient presents with    Establish Care     History of Present Illness      HPI    Aminta Schreiber is a 63 y.o. female with sHTN, T2DM, obesity, TORIBIO, chronic allergic rhinitis, urge incontinence and h/o nephrolithiasis who presents for establishment of care. She c/o recurrent non-itchy painless rash around her lips with associated dryness over many years. She has also been having numbness/tingling sensation on her left lower laterl thigh over the years, no associated swelling, pain, redness or preceding trauma.      Problem List Addressed This Visit:    1. Encounter for medical examination to establish care    2. Dermatitis of lip  -okay to use topical steroid PRN when it flares  -ensure BID emollient, stay off any form of scent containing balms/gloss    3. Hypertension associated with type 2 diabetes mellitus  Overview:  - well controlled  - continue current medications  - counseling on taking medication at the same time BID    Orders:  -     losartan-hydrochlorothiazide 50-12.5 mg (HYZAAR) 50-12.5 mg per tablet; Take 1 tablet by mouth 2 (two) times a day.  Dispense: 180 tablet; Refill: 3    4. Type 2 diabetes mellitus without complication, without long-term current use of insulin    - well controlled  - continue current management plan   - patient encouraged to notify me with any changes    Orders:  -     metFORMIN (GLUCOPHAGE-XR) 500 MG ER 24hr tablet; Take 2 tablets (1,000 mg total) by mouth once daily.  Dispense: 180 tablet; Refill: 3  -     SITagliptin phosphate (JANUVIA) 25 MG Tab; Take 1 tablet (25 mg total) by mouth once daily.  Dispense: 90 tablet; Refill: 3    5. Hyperlipidemia associated with type 2 diabetes mellitus    - well controlled  - continue current medication    Orders:  -     rosuvastatin (CRESTOR) 10 MG tablet; Take 1 tablet (10 mg total) by mouth every evening.  Dispense: 90 tablet; Refill: 3    6. Chronic  rhinitis  Overview:  - well controlled  - continue current management plan   - patient encouraged to notify me with any changes      7. Encounter for colorectal cancer screening  -     Cologuard Screening (Multitarget Stool DNA); Future; Expected date: 06/07/2024    8. Class 3 severe obesity due to excess calories with serious comorbidity and body mass index (BMI) of 45.0 to 49.9 in adult  Overview:  - discussed recommendation for diet and cardiovascular exercise  - counseling on lifestyle modifications for risk factor reduction        9. Vitamin D deficiency  Overview:  - completed D2 50,000 units weekly  - currently on D3 1000 units daily  - 03/26/2021 vitamin-D level 24  - patient is near goal recommend increase her over-the-counter D3 to 2000 units daily      10. Meralgia paraesthetica, left  -weight loss advised         Health Maintenance   Topic Date Due    Colorectal Cancer Screening  04/14/2022    Shingles Vaccine (2 of 2) 09/27/2023    Eye Exam  07/31/2024    LDCT Lung Screen  07/20/2024    Mammogram  09/13/2024    Hemoglobin A1c  09/15/2024    Lipid Panel  03/15/2025    Foot Exam  03/26/2025    Low Dose Statin  06/07/2025    TETANUS VACCINE  08/02/2033    Hepatitis C Screening  Completed       Past Medical History:   Diagnosis Date    Adrenal adenoma, left 12/14/2021    Atopic dermatitis 10/11/2022    Diabetes mellitus, type 2     GERD (gastroesophageal reflux disease)     Gross hematuria 04/30/2019    H/O left breast biopsy     Hyperlipidemia     Hypertension     Kidney stone     Migraines     Myelolipoma of left adrenal gland 7/24/2018    - benign and followed by Dr. Arevalo - 12/14/21 CT abd: Bilateral fat attenuation adrenal masses consistent with benign myelolipomas,, 4.1 cm on the right and 5.0 cm on the left.    Nephrolithiasis 07/24/2018    Personal history of colonic polyps 01/15/2014    Raynaud's disease     Renal calculus, right 11/23/2015    Renal colic on right side 11/23/2015    Right ureteral  "calculus 04/30/2019    Urinary tract infection     Vaginal infection        Past Surgical History:   Procedure Laterality Date    BREAST CYST EXCISION      EXTRACORPOREAL SHOCK WAVE LITHOTRIPSY Right 08/09/2018    Procedure: LITHOTRIPSY, ESWL;  Surgeon: Milton Garland MD;  Location: [facility] OR;  Service: Urology;  Laterality: Right;    EXTRACORPOREAL SHOCK WAVE LITHOTRIPSY Right 05/02/2019    Procedure: LITHOTRIPSY, ESWL;  Surgeon: Milton Garland MD;  Location: [facility] OR;  Service: Urology;  Laterality: Right;    EXTRACORPOREAL SHOCK WAVE LITHOTRIPSY Right 07/25/2019    Procedure: LITHOTRIPSY, ESWL;  Surgeon: Milton Garland MD;  Location: [facility] OR;  Service: Urology;  Laterality: Right;    HYSTERECTOMY      Partial    LIPOMA RESECTION Right 06/07/2022    Procedure: EXCISION, LIPOMA;  Surgeon: Odell Escamilla Jr., MD;  Location: [facility] OR;  Service: Orthopedics;  Laterality: Right;  include nerve exploration    LITHOTRIPSY      x 3 or 4 per pt       family history includes Cancer in her father and son; Diabetes in her mother; Early death in her paternal grandmother; Hypertension in her father, maternal aunt, mother, and son; Migraines in her daughter; No Known Problems in her brother and son.    Social History     Tobacco Use    Smoking status: Every Day     Current packs/day: 0.50     Average packs/day: 0.5 packs/day for 51.2 years (25.6 ttl pk-yrs)     Types: Cigarettes     Start date: 4/1/1973     Passive exposure: Current    Smokeless tobacco: Never    Tobacco comments:     Still smoking   Substance Use Topics    Alcohol use: Yes     Alcohol/week: 1.0 standard drink of alcohol     Types: 1 Standard drinks or equivalent per week     Comment: 'on occasion"    Drug use: Never       Review of Systems   Constitutional:  Negative for fatigue and fever.   Respiratory:  Negative for cough and chest tightness.    Gastrointestinal:  Negative for abdominal pain, diarrhea and vomiting.   Endocrine: Negative for polydipsia and " polyphagia.   Genitourinary:  Negative for difficulty urinating, dysuria and frequency.   Musculoskeletal:  Negative for arthralgias, back pain, gait problem and joint swelling.   Skin:  Positive for rash.   Neurological:  Positive for numbness. Negative for seizures, weakness and headaches.   Psychiatric/Behavioral:  Negative for sleep disturbance.        Outpatient Encounter Medications as of 6/7/2024   Medication Sig Note Dispense Refill    ascorbic acid, vitamin C, (VITAMIN C) 1000 MG tablet Take 1,000 mg by mouth once daily.       cholecalciferol, vitamin D3, (VITAMIN D3) 50 mcg (2,000 unit) Cap Take 1 capsule (2,000 Units total) by mouth once daily.  90 capsule 2    co-enzyme Q-10 30 mg capsule Take 30 mg by mouth once daily.       cyanocobalamin (VITAMIN B-12) 1000 MCG tablet Take 100 mcg by mouth once daily.       ferrous sulfate (FEOSOL) 325 mg (65 mg iron) Tab tablet Take 1 tablet by mouth once daily.       fluticasone propionate (FLONASE) 50 mcg/actuation nasal spray 1 spray (50 mcg total) by Each Nostril route once daily. 3/26/2024: prn 16 g 0    latanoprost 0.005 % ophthalmic solution        mirabegron (MYRBETRIQ) 50 mg Tb24 Take 1 tablet (50 mg total) by mouth once daily.  90 tablet 3    moxifloxacin (VIGAMOX) 0.5 % ophthalmic solution Place 1 drop into both eyes 4 (four) times daily.       omeprazole (PRILOSEC) 40 MG capsule Take 1 capsule (40 mg total) by mouth every morning.  90 capsule 3    tamsulosin (FLOMAX) 0.4 mg Cap Take 1 capsule (0.4 mg total) by mouth once daily.  30 capsule 11    valACYclovir (VALTREX) 1000 MG tablet Take 1 tablet by mouth once daily.       [DISCONTINUED] folic acid (FOLVITE) 1 MG tablet Take 1 tablet (1,000 mcg total) by mouth once daily.  90 tablet 3    [DISCONTINUED] losartan-hydrochlorothiazide 50-12.5 mg (HYZAAR) 50-12.5 mg per tablet Take 1 tablet by mouth 2 (two) times a day.  180 tablet 3    [DISCONTINUED] metFORMIN (GLUCOPHAGE-XR) 500 MG ER 24hr tablet Take 2  "tablets (1,000 mg total) by mouth once daily.  180 tablet 3    [DISCONTINUED] rosuvastatin (CRESTOR) 10 MG tablet TAKE 1 TABLET BY MOUTH ONCE DAILY IN THE EVENING  90 tablet 3    [DISCONTINUED] SITagliptin phosphate (JANUVIA) 25 MG Tab Take 1 tablet (25 mg total) by mouth once daily.  90 tablet 3    losartan-hydrochlorothiazide 50-12.5 mg (HYZAAR) 50-12.5 mg per tablet Take 1 tablet by mouth 2 (two) times a day.  180 tablet 3    metFORMIN (GLUCOPHAGE-XR) 500 MG ER 24hr tablet Take 2 tablets (1,000 mg total) by mouth once daily.  180 tablet 3    rosuvastatin (CRESTOR) 10 MG tablet Take 1 tablet (10 mg total) by mouth every evening.  90 tablet 3    SITagliptin phosphate (JANUVIA) 25 MG Tab Take 1 tablet (25 mg total) by mouth once daily.  90 tablet 3    [DISCONTINUED] MYRBETRIQ 50 mg Tb24 Take 1 tablet by mouth once daily  30 tablet 0    [DISCONTINUED] promethazine-dextromethorphan (PROMETHAZINE-DM) 6.25-15 mg/5 mL Syrp Take 5 mLs by mouth every 6 (six) hours. (Patient not taking: Reported on 6/7/2024)        No facility-administered encounter medications on file as of 6/7/2024.        Review of patient's allergies indicates:  No Known Allergies    Physical Exam      Vital Signs  Temp: 99 °F (37.2 °C)  Temp Source: Temporal  Pulse: 74  SpO2: 100 %  BP: 126/68  BP Location: Right arm  Patient Position: Sitting  Pain Score: 0-No pain  Height and Weight  Height: 5' 5" (165.1 cm)  Weight: 124.8 kg (275 lb 2.2 oz)  BSA (Calculated - sq m): 2.39 sq meters  BMI (Calculated): 45.8  Weight in (lb) to have BMI = 25: 149.9]    Physical Exam  Vitals reviewed.   Constitutional:       Appearance: Normal appearance. She is obese.   HENT:      Head: Normocephalic and atraumatic.      Right Ear: Tympanic membrane normal.      Left Ear: Tympanic membrane normal.      Mouth/Throat:      Mouth: Mucous membranes are moist.      Pharynx: Oropharynx is clear.   Eyes:      Extraocular Movements: Extraocular movements intact.      " "Conjunctiva/sclera: Conjunctivae normal.      Pupils: Pupils are equal, round, and reactive to light.   Cardiovascular:      Rate and Rhythm: Normal rate and regular rhythm.      Pulses: Normal pulses.      Heart sounds: Normal heart sounds.   Pulmonary:      Breath sounds: Normal breath sounds.   Abdominal:      General: Abdomen is flat. Bowel sounds are normal.      Palpations: Abdomen is soft.   Musculoskeletal:      Cervical back: Normal range of motion.      Right lower leg: No edema.      Left lower leg: No edema.   Skin:     General: Skin is warm and dry.   Neurological:      General: No focal deficit present.      Mental Status: She is alert and oriented to person, place, and time.      Sensory: No sensory deficit.   Psychiatric:         Mood and Affect: Mood normal.         Behavior: Behavior normal.          Laboratory:  CBC:  Recent Labs   Lab Result Units 03/15/24  0922   WBC K/uL 5.32   RBC M/uL 4.02   Hemoglobin g/dL 11.5*   Hematocrit % 35.8*   Platelets K/uL 284   MCV fL 89   MCH pg 28.6   MCHC g/dL 32.1     CMP:  Recent Labs   Lab Result Units 03/15/24  0922   Glucose mg/dL 125*   Calcium mg/dL 8.7   Albumin g/dL 3.9   Total Protein g/dL 7.0   Sodium mmol/L 146*   Potassium mmol/L 4.0   CO2 mmol/L 33*   Chloride mmol/L 104   BUN mg/dL 9   Alkaline Phosphatase U/L 88   ALT U/L 20   AST U/L 23   Total Bilirubin mg/dL 0.4     URINALYSIS:  No results for input(s): "COLORU", "CLARITYU", "SPECGRAV", "PHUR", "PROTEINUA", "GLUCOSEU", "BILIRUBINCON", "BLOODU", "WBCU", "RBCU", "BACTERIA", "MUCUS", "NITRITE", "LEUKOCYTESUR", "UROBILINOGEN", "HYALINECASTS" in the last 2160 hours.   LIPIDS:  Recent Labs   Lab Result Units 03/15/24  0922   HDL mg/dL 45   Cholesterol mg/dL 142   Triglycerides mg/dL 55   LDL Cholesterol mg/dL 86.0   HDL/Cholesterol Ratio % 31.7   Non-HDL Cholesterol mg/dL 97   Total Cholesterol/HDL Ratio  3.2     TSH:  No results for input(s): "TSH" in the last 2160 hours.  A1C:  Recent Labs   Lab " Result Units 03/15/24  0922   Hemoglobin A1C % 6.4*       Radiology:      Assessment/Plan     Aminta Schreiber is a 63 y.o.female with:    1. Encounter for medical examination to establish care    2. Dermatitis of lip  -okay to use topical steroid PRN when it flares  -ensure BID emollient, stay off any form of scent containing balms/gloss    3. Hypertension associated with type 2 diabetes mellitus  Overview:  - well controlled  - continue current medications  - counseling on taking medication at the same time BID    Orders:  -     losartan-hydrochlorothiazide 50-12.5 mg (HYZAAR) 50-12.5 mg per tablet; Take 1 tablet by mouth 2 (two) times a day.  Dispense: 180 tablet; Refill: 3    4. Type 2 diabetes mellitus without complication, without long-term current use of insulin    - well controlled  - continue current management plan   - patient encouraged to notify me with any changes    Orders:  -     metFORMIN (GLUCOPHAGE-XR) 500 MG ER 24hr tablet; Take 2 tablets (1,000 mg total) by mouth once daily.  Dispense: 180 tablet; Refill: 3  -     SITagliptin phosphate (JANUVIA) 25 MG Tab; Take 1 tablet (25 mg total) by mouth once daily.  Dispense: 90 tablet; Refill: 3    5. Hyperlipidemia associated with type 2 diabetes mellitus    - well controlled  - continue current medication    Orders:  -     rosuvastatin (CRESTOR) 10 MG tablet; Take 1 tablet (10 mg total) by mouth every evening.  Dispense: 90 tablet; Refill: 3    6. Chronic rhinitis  Overview:  - well controlled  - continue current management plan   - patient encouraged to notify me with any changes      7. Encounter for colorectal cancer screening  -     Cologuard Screening (Multitarget Stool DNA); Future; Expected date: 06/07/2024    8. Class 3 severe obesity due to excess calories with serious comorbidity and body mass index (BMI) of 45.0 to 49.9 in adult  Overview:  - discussed recommendation for diet and cardiovascular exercise  - counseling on lifestyle modifications for  risk factor reduction        9. Vitamin D deficiency  Overview:  - completed D2 50,000 units weekly  - currently on D3 1000 units daily  - 03/26/2021 vitamin-D level 24  - patient is near goal recommend increase her over-the-counter D3 to 2000 units daily      10. Meralgia paraesthetica, left  -weight loss advised      -Continue current medications and maintain follow up with specialists.      Patient verbalizes understanding and agrees with current treatment plan.      Rayne Mcfadden MD  [facility]

## 2024-06-19 DIAGNOSIS — Z78.0 MENOPAUSE: ICD-10-CM

## 2024-06-24 DIAGNOSIS — K21.9 GASTROESOPHAGEAL REFLUX DISEASE WITHOUT ESOPHAGITIS: ICD-10-CM

## 2024-06-24 RX ORDER — OMEPRAZOLE 40 MG/1
40 CAPSULE, DELAYED RELEASE ORAL EVERY MORNING
Qty: 90 CAPSULE | Refills: 3 | Status: SHIPPED | OUTPATIENT
Start: 2024-06-24

## 2024-06-24 NOTE — TELEPHONE ENCOUNTER
Care Due:                  Date            Visit Type   Department     Provider  --------------------------------------------------------------------------------                                             DESC FAMILY  Last Visit: 06-      [facility]  Rayne Mcfadden  Next Visit: None Scheduled  None         None Found                                                            Last  Test          Frequency    Reason                     Performed    Due Date  --------------------------------------------------------------------------------    HBA1C.......  6 months...  SITagliptin, metFORMIN...  03-   09-    [facility] Embedded Care Due Messages. Reference number: [id number].   6/24/2024 7:05:12 AM CDT

## 2024-06-24 NOTE — TELEPHONE ENCOUNTER
Refill Routing Note   Medication(s) are not appropriate for processing by [facility] for the following reason(s):        No active prescription written by provider    [facility] action(s):  Defer   Requires labs : Yes        Medication Therapy Plan: Last ordered: 11 months ago (7/5/2023) by Verenice Chase, DO      Appointments  past 12m or future 3m with PCP    Date Provider   Last Visit   6/7/2024 Rayne Mcfadden MD   Next Visit   12/9/2024 Rayne Mcfadden MD   ED visits in past 90 days: 0        Note composed:7:13 AM 06/24/2024

## 2024-06-25 DIAGNOSIS — Z00.00 ENCOUNTER FOR MEDICARE ANNUAL WELLNESS EXAM: ICD-10-CM

## 2024-08-02 ENCOUNTER — OFFICE VISIT (OUTPATIENT)
Dept: UROLOGY | Facility: CLINIC | Age: 64
End: 2024-08-02
Payer: MEDICARE

## 2024-08-02 VITALS
BODY MASS INDEX: 45.55 KG/M2 | WEIGHT: 273.38 LBS | HEIGHT: 65 IN | DIASTOLIC BLOOD PRESSURE: 68 MMHG | HEART RATE: 74 BPM | SYSTOLIC BLOOD PRESSURE: 126 MMHG

## 2024-08-02 DIAGNOSIS — R35.1 NOCTURIA: ICD-10-CM

## 2024-08-02 DIAGNOSIS — R35.0 URINARY FREQUENCY: ICD-10-CM

## 2024-08-02 DIAGNOSIS — R39.15 URINARY URGENCY: ICD-10-CM

## 2024-08-02 DIAGNOSIS — Z87.442 HISTORY OF NEPHROLITHIASIS: Primary | ICD-10-CM

## 2024-08-02 PROCEDURE — 99999 PR PBB SHADOW E&M-EST. PATIENT-LVL IV: CPT | Mod: PBBFAC,,, | Performed by: UROLOGY

## 2024-08-02 NOTE — PATIENT INSTRUCTIONS
Continue mirabegron 50 mg daily  Obtain urinalysis and urine culture to evaluate urine frequency  If urinalysis shows blood or crystals will obtain x-ray to evaluate nephrolithiasis.  F/U yearly

## 2024-08-02 NOTE — PROGRESS NOTES
Subjective:      Patient ID: Aminta Schreiber is a 64 y.o. female.    Chief Complaint: OAB    Mrs. Schreiber is a 64-year-old female with a history of nephrolithiasis.  She has been stable without any recurrent flank pain or issues however she has also had significant urinary urgency and frequency which has responded well to Myrbetriq 50 mg daily.  The patient recently ran out of medication and had not been seen within the year so was scheduled for follow-up appointment.  We will refill medications and do urinalysis and urine cultures secondary to patient complaint of increased urinary frequency even prior to running out of medication.  The patient has no pain or fever.    Urinary Frequency   This is a chronic problem. The problem has been unchanged. The patient is experiencing no pain. She is Sexually active. There is No history of pyelonephritis. Pertinent negatives include no behavior changes, chills, flank pain, frequency, hematuria, nausea, urgency, vomiting, bubble bath use, constipation or rash. She has tried nothing for the symptoms. The treatment provided significant relief. There is no history of catheterization, diabetes insipidus, diabetes mellitus, genitourinary reflux, hypertension, kidney stones, recurrent UTIs, a single kidney, STD, urinary stasis or a urological procedure.     Review of Systems   Constitutional:  Negative for activity change, appetite change, chills, fatigue and fever.   HENT:  Negative for congestion, ear pain, hearing loss, nosebleeds, sinus pressure, sore throat and trouble swallowing.    Eyes:  Negative for pain and visual disturbance.   Respiratory:  Negative for apnea, cough and shortness of breath.    Cardiovascular:  Negative for chest pain and leg swelling.   Gastrointestinal:  Negative for abdominal distention, abdominal pain, anal bleeding, blood in stool, constipation, diarrhea, nausea, rectal pain and vomiting.   Endocrine: Negative for cold intolerance, heat intolerance,  polydipsia, polyphagia and polyuria.   Genitourinary:  Negative for decreased urine volume, difficulty urinating, dyspareunia, dysuria, enuresis, flank pain, frequency, genital sores, hematuria, menstrual problem, pelvic pain, urgency, vaginal bleeding, vaginal discharge and vaginal pain.   Musculoskeletal:  Negative for arthralgias and back pain.   Skin:  Negative for color change, pallor and rash.   Allergic/Immunologic: Negative for environmental allergies, food allergies and immunocompromised state.   Neurological:  Negative for dizziness, speech difficulty, weakness and headaches.   Hematological:  Negative for adenopathy. Does not bruise/bleed easily.   Psychiatric/Behavioral: Negative.        Objective:     Physical Exam  Vitals and nursing note reviewed.   Constitutional:       Appearance: Normal appearance. She is well-developed. She is not toxic-appearing or diaphoretic.   HENT:      Head: Normocephalic and atraumatic.      Right Ear: External ear normal.      Left Ear: External ear normal.      Nose: Nose normal. No congestion or rhinorrhea.      Mouth/Throat:      Mouth: Mucous membranes are moist.      Pharynx: Oropharynx is clear. No oropharyngeal exudate or posterior oropharyngeal erythema.   Eyes:      General: No scleral icterus.        Right eye: No discharge.         Left eye: No discharge.      Extraocular Movements: Extraocular movements intact.      Conjunctiva/sclera: Conjunctivae normal.      Pupils: Pupils are equal, round, and reactive to light.   Cardiovascular:      Rate and Rhythm: Normal rate and regular rhythm.      Heart sounds: Normal heart sounds.   Pulmonary:      Effort: Pulmonary effort is normal.   Abdominal:      General: Bowel sounds are normal. There is no distension.      Palpations: Abdomen is soft. There is no mass.      Tenderness: There is no abdominal tenderness. There is no right CVA tenderness, left CVA tenderness, guarding or rebound.      Hernia: No hernia is  present.   Genitourinary:     General: Normal vulva.      Rectum: Normal.      Comments: No CVA tenderness, no bladder tenderness  Musculoskeletal:         General: Normal range of motion.      Cervical back: Normal range of motion and neck supple.   Skin:     General: Skin is warm and dry.      Capillary Refill: Capillary refill takes less than 2 seconds.   Neurological:      General: No focal deficit present.      Mental Status: She is alert and oriented to person, place, and time.      Deep Tendon Reflexes: Reflexes are normal and symmetric.   Psychiatric:         Mood and Affect: Mood normal.         Behavior: Behavior normal.         Thought Content: Thought content normal.         Judgment: Judgment normal.        Assessment:      1. History of nephrolithiasis    2. Urinary frequency    3. Urinary urgency    4. Nocturia      Plan:     Patient Instructions   Continue mirabegron 50 mg daily  Obtain urinalysis and urine culture to evaluate urine frequency  If urinalysis shows blood or crystals will obtain x-ray to evaluate nephrolithiasis.  F/U yearly

## 2024-08-07 DIAGNOSIS — E11.9 TYPE 2 DIABETES MELLITUS WITHOUT COMPLICATION, UNSPECIFIED WHETHER LONG TERM INSULIN USE: ICD-10-CM

## 2024-09-26 NOTE — PROGRESS NOTES
Aminta Schreiber presented for a  Medicare AWV and comprehensive Health Risk Assessment today. The following components were reviewed and updated:    Medical history  Family History  Social history  Allergies and Current Medications  Health Risk Assessment  Health Maintenance  Care Team         ** See Completed Assessments for Annual Wellness Visit within the encounter summary.**         The following assessments were completed:  Living Situation  CAGE  Depression Screening  Timed Get Up and Go  Whisper Test  Cognitive Function Screening    Nutrition Screening  ADL Screening  PAQ Screening      Opioid documentation for eAWV      Patient does not have a current opioid prescription.        Review for Substance Use Disorders: Patient does not use substance        Current Outpatient Medications:     ascorbic acid, vitamin C, (VITAMIN C) 1000 MG tablet, Take 1,000 mg by mouth once daily., Disp: , Rfl:     cholecalciferol, vitamin D3, (VITAMIN D3) 50 mcg (2,000 unit) Cap, Take 1 capsule (2,000 Units total) by mouth once daily., Disp: 90 capsule, Rfl: 2    co-enzyme Q-10 30 mg capsule, Take 30 mg by mouth once daily., Disp: , Rfl:     cyanocobalamin (VITAMIN B-12) 1000 MCG tablet, Take 100 mcg by mouth once daily., Disp: , Rfl:     ferrous sulfate (FEOSOL) 325 mg (65 mg iron) Tab tablet, Take 1 tablet by mouth once daily., Disp: , Rfl:     latanoprost 0.005 % ophthalmic solution, , Disp: , Rfl:     losartan-hydrochlorothiazide 50-12.5 mg (HYZAAR) 50-12.5 mg per tablet, Take 1 tablet by mouth 2 (two) times a day., Disp: 180 tablet, Rfl: 3    metFORMIN (GLUCOPHAGE-XR) 500 MG ER 24hr tablet, Take 2 tablets (1,000 mg total) by mouth once daily., Disp: 180 tablet, Rfl: 3    mirabegron (MYRBETRIQ) 50 mg Tb24, Take 1 tablet (50 mg total) by mouth once daily., Disp: 90 tablet, Rfl: 3    omeprazole (PRILOSEC) 40 MG capsule, Take 1 capsule by mouth in the morning, Disp: 90 capsule, Rfl: 3    rosuvastatin (CRESTOR) 10 MG tablet, Take 1  "tablet (10 mg total) by mouth every evening., Disp: 90 tablet, Rfl: 3    SITagliptin phosphate (JANUVIA) 25 MG Tab, Take 1 tablet (25 mg total) by mouth once daily., Disp: 90 tablet, Rfl: 3    tamsulosin (FLOMAX) 0.4 mg Cap, Take 1 capsule (0.4 mg total) by mouth once daily., Disp: 30 capsule, Rfl: 11    valACYclovir (VALTREX) 1000 MG tablet, Take 1 tablet by mouth once daily., Disp: , Rfl:     moxifloxacin (VIGAMOX) 0.5 % ophthalmic solution, Place 1 drop into both eyes 4 (four) times daily. (Patient not taking: Reported on 9/27/2024), Disp: , Rfl:        Vitals:    09/27/24 1039   BP: 124/64   Pulse: 88   SpO2: 98%   Weight: 124.4 kg (274 lb 5.1 oz)   Height: 5' 5" (1.651 m)   PainSc: 0-No pain      Physical Exam  Vitals reviewed.   Constitutional:       General: She is not in acute distress.     Appearance: Normal appearance. She is obese. She is not ill-appearing.   HENT:      Head: Normocephalic and atraumatic.      Mouth/Throat:      Mouth: Mucous membranes are moist.   Eyes:      General: No scleral icterus.        Right eye: No discharge.         Left eye: No discharge.      Extraocular Movements: Extraocular movements intact.      Conjunctiva/sclera: Conjunctivae normal.   Cardiovascular:      Rate and Rhythm: Normal rate.   Pulmonary:      Effort: Pulmonary effort is normal. No respiratory distress.   Musculoskeletal:      Cervical back: Normal range of motion.   Skin:     General: Skin is warm and dry.   Neurological:      Mental Status: She is alert and oriented to person, place, and time.   Psychiatric:         Mood and Affect: Mood normal.         Behavior: Behavior normal. Behavior is cooperative.         Cognition and Memory: Cognition and memory normal.               Diagnoses and health risks identified today and associated recommendations/orders:    1. Encounter for preventive health examination  - Chart reviewed. Problem list updated. Discussed current medical diagnosis, current medications, " medical/surgical/family/social history; updated provider list; documented vital signs; identified any cognitive impairment; and updated risk factor list. Addressed any outstanding health maintenance. Provided patient with personalized health advice. Continue to follow up with PCP and any specialists.     2. Hyperlipidemia associated with type 2 diabetes mellitus  Chronic; stable on current treatment plan; follow up with PCP  - taking statin     3. Class 3 severe obesity with serious comorbidity and body mass index (BMI) of 45.0 to 49.9 in adult, unspecified obesity type  - Recommendation for healthy diet and increasing exercise as tolerated with goal of 150min/week . Recommend weight loss    4. Type 2 diabetes mellitus without complication, without long-term current use of insulin  Chronic; stable on current treatment plan; follow up with PCP  - due for HgA1c, will get today  -Taking metformin and januvia  - Hemoglobin A1C; Future    5. Aortic atherosclerosis - seen on CT chest 12/2021  Chronic; stable on current treatment plan; follow up with PCP  -= taking statin     6. Hypertension associated with type 2 diabetes mellitus  Chronic; stable on current treatment plan; follow up with PCP  - taking losartan-HCTZ    7. Raynaud's disease without gangrene  Chronic; stable on current treatment plan; follow up with PCP    8. Lumbar spondylosis  Chronic; stable on current treatment plan; follow up with PCP    9. OAB (overactive bladder)  Chronic; stable on current treatment plan; follow up with PCP  - follow up with urology .    10. Vitamin D deficiency  Chronic; stable on current treatment plan; follow up with PCP  -= taking vit d supplements     11. Gastroesophageal reflux disease, unspecified whether esophagitis present  Chronic; stable on current treatment plan; follow up with PCP  - taking ppi     12. Chronic midline low back pain without sciatica  Chronic; stable on current treatment plan; follow up with PCP  = follow  up with ortho       Provided Aminta with a 5-10 year written screening schedule and personal prevention plan. Recommendations were developed using the USPSTF age appropriate recommendations. Education, counseling, and referrals were provided as needed. After Visit Summary printed and given to patient which includes a list of additional screenings\tests needed.    Follow up in about 1 year (around 9/27/2025) for your next medicare wellness visit.    Queenie Ring, FNP-C    Advance Care Planning     I offered to discuss advanced care planning, including how to pick a person who would make decisions for you if you were unable to make them for yourself, called a health care power of , and what kind of decisions you might make such as use of life sustaining treatments such as ventilators and tube feeding when faced with a life limiting illness recorded on a living will that they will need to know. (How you want to be cared for as you near the end of your natural life)     X Patient is interested in learning more about how to make advanced directives.  I provided them paperwork and offered to discuss this with them.

## 2024-09-27 ENCOUNTER — OFFICE VISIT (OUTPATIENT)
Dept: FAMILY MEDICINE | Facility: CLINIC | Age: 64
End: 2024-09-27
Payer: MEDICARE

## 2024-09-27 VITALS
BODY MASS INDEX: 45.7 KG/M2 | DIASTOLIC BLOOD PRESSURE: 64 MMHG | HEART RATE: 88 BPM | HEIGHT: 65 IN | SYSTOLIC BLOOD PRESSURE: 124 MMHG | OXYGEN SATURATION: 98 % | WEIGHT: 274.31 LBS

## 2024-09-27 DIAGNOSIS — I73.00 RAYNAUD'S DISEASE WITHOUT GANGRENE: ICD-10-CM

## 2024-09-27 DIAGNOSIS — M47.816 LUMBAR SPONDYLOSIS: ICD-10-CM

## 2024-09-27 DIAGNOSIS — E11.59 HYPERTENSION ASSOCIATED WITH TYPE 2 DIABETES MELLITUS: ICD-10-CM

## 2024-09-27 DIAGNOSIS — E66.01 CLASS 3 SEVERE OBESITY WITH SERIOUS COMORBIDITY AND BODY MASS INDEX (BMI) OF 45.0 TO 49.9 IN ADULT, UNSPECIFIED OBESITY TYPE: ICD-10-CM

## 2024-09-27 DIAGNOSIS — E78.5 HYPERLIPIDEMIA ASSOCIATED WITH TYPE 2 DIABETES MELLITUS: ICD-10-CM

## 2024-09-27 DIAGNOSIS — E11.9 TYPE 2 DIABETES MELLITUS WITHOUT COMPLICATION, WITHOUT LONG-TERM CURRENT USE OF INSULIN: ICD-10-CM

## 2024-09-27 DIAGNOSIS — E55.9 VITAMIN D DEFICIENCY: ICD-10-CM

## 2024-09-27 DIAGNOSIS — Z00.00 ENCOUNTER FOR PREVENTIVE HEALTH EXAMINATION: Primary | ICD-10-CM

## 2024-09-27 DIAGNOSIS — M54.50 CHRONIC MIDLINE LOW BACK PAIN WITHOUT SCIATICA: ICD-10-CM

## 2024-09-27 DIAGNOSIS — G89.29 CHRONIC MIDLINE LOW BACK PAIN WITHOUT SCIATICA: ICD-10-CM

## 2024-09-27 DIAGNOSIS — N32.81 OAB (OVERACTIVE BLADDER): ICD-10-CM

## 2024-09-27 DIAGNOSIS — I15.2 HYPERTENSION ASSOCIATED WITH TYPE 2 DIABETES MELLITUS: ICD-10-CM

## 2024-09-27 DIAGNOSIS — E11.69 HYPERLIPIDEMIA ASSOCIATED WITH TYPE 2 DIABETES MELLITUS: ICD-10-CM

## 2024-09-27 DIAGNOSIS — I70.0 AORTIC ATHEROSCLEROSIS: ICD-10-CM

## 2024-09-27 DIAGNOSIS — K21.9 GASTROESOPHAGEAL REFLUX DISEASE, UNSPECIFIED WHETHER ESOPHAGITIS PRESENT: ICD-10-CM

## 2024-09-27 PROCEDURE — 99999 PR PBB SHADOW E&M-EST. PATIENT-LVL IV: CPT | Mod: PBBFAC,,, | Performed by: NURSE PRACTITIONER

## 2024-09-27 NOTE — PATIENT INSTRUCTIONS
[facility] DEPARTMENT  You should receive a gift card from [employer] for [employer] (formerly [employer]) for completing the medicare wellness visit. You can try calling # [phone] ([facility] opentabs department) to ask about your giftcard. Please wait 1 week to call to ensure that [employer] [facility] received documentation for proof of medicare visit.         Counseling and Referral of Other Preventative  (Italic type indicates deductible and co-insurance are waived)    Patient Name: Aminta Schreiber  Today's Date: 9/27/2024    Health Maintenance         Date Due Completion Date    DEXA Scan 11/13/2020 11/13/2017    LDCT Lung Screen 07/20/2024 7/20/2023    Eye Exam 07/31/2024 7/31/2023    Mammogram 09/13/2024 9/13/2023    Override on 7/28/2014: Done    Hemoglobin A1c 09/15/2024 3/15/2024    Pneumococcal Vaccines (Age 65+) (2 of 2 - PCV) 10/01/2024 (Originally 7/20/2021) 7/20/2020    Influenza Vaccine (1) 10/03/2024 (Originally 9/1/2024) 12/7/2022    RSV Vaccine (Age 60+ and Pregnant patients) (1 - 1-dose 60+ series) 10/03/2024 (Originally 6/14/2019) ---    COVID-19 Vaccine (6 - 2023-24 season) 10/04/2024 (Originally 9/1/2024) 7/5/2023    Colorectal Cancer Screening 07/06/2027 (Originally 1960) 7/6/2024    Diabetes Urine Screening 03/15/2025 3/15/2024    Lipid Panel 03/15/2025 3/15/2024    Foot Exam 03/26/2025 3/26/2024    Low Dose Statin 08/02/2025 8/2/2024    TETANUS VACCINE 08/02/2033 8/2/2023          Orders Placed This Encounter   Procedures    Hemoglobin A1C     The following information is provided to all patients.  This information is to help you find resources for any of the problems found today that may be affecting your health:                  Living healthy guide: www.[facility].[place].gov      Understanding Diabetes: www.diabetes.org      Eating healthy: www.cdc.gov/healthyweight      CDC home safety checklist: www.cdc.gov/steadi/patient.html      Agency on Aging: www.goea.[place].gov      Alcoholics anonymous  (AA): www.aa.org      Physical Activity: www.matthew.nih.gov/xz4txzh      Tobacco use: www.quitwithusla.org

## 2024-10-07 ENCOUNTER — HOSPITAL ENCOUNTER (OUTPATIENT)
Dept: RADIOLOGY | Facility: HOSPITAL | Age: 64
Discharge: HOME | End: 2024-10-07
Attending: FAMILY MEDICINE
Payer: MEDICARE

## 2024-10-07 DIAGNOSIS — R91.1 PULMONARY NODULE 1 CM OR GREATER IN DIAMETER: ICD-10-CM

## 2024-10-07 PROCEDURE — 71250 CT THORAX DX C-: CPT | Mod: TC

## 2024-10-07 PROCEDURE — 71250 CT THORAX DX C-: CPT | Mod: 26,,, | Performed by: RADIOLOGY

## 2024-11-20 ENCOUNTER — OFFICE VISIT (OUTPATIENT)
Dept: URGENT CARE | Facility: CLINIC | Age: 64
End: 2024-11-20
Payer: MEDICARE

## 2024-11-20 VITALS
HEIGHT: 65 IN | HEART RATE: 77 BPM | DIASTOLIC BLOOD PRESSURE: 64 MMHG | SYSTOLIC BLOOD PRESSURE: 122 MMHG | RESPIRATION RATE: 16 BRPM | OXYGEN SATURATION: 97 % | TEMPERATURE: 99 F | BODY MASS INDEX: 45.69 KG/M2 | WEIGHT: 274.25 LBS

## 2024-11-20 DIAGNOSIS — M25.512 ACUTE PAIN OF LEFT SHOULDER: Primary | ICD-10-CM

## 2024-11-20 PROCEDURE — 99213 OFFICE O/P EST LOW 20 MIN: CPT | Mod: S$GLB,,, | Performed by: FAMILY MEDICINE

## 2024-11-20 RX ORDER — IBUPROFEN 600 MG/1
600 TABLET ORAL EVERY 8 HOURS PRN
Qty: 30 TABLET | Refills: 0 | Status: SHIPPED | OUTPATIENT
Start: 2024-11-20

## 2024-11-20 NOTE — PROGRESS NOTES
"Subjective:      Patient ID: Aminta Schreiber is a 64 y.o. female.    Vitals:  height is 5' 5" (1.651 m) and weight is 124.4 kg (274 lb 4 oz). Her oral temperature is 98.8 °F (37.1 °C). Her blood pressure is 122/64 and her pulse is 77. Her respiration is 16 and oxygen saturation is 97%.     Chief Complaint: Shoulder Pain (Left)    64 year old female presenting with left shoulder pain since Sunday (4 days ago) Patient denies trauma to extremity and states she doesn't know what she has done to it. Reports pain is relieved by tylenol and is able to achieve full range of motion without difficulty      Shoulder Pain   The pain is present in the left shoulder. This is a new problem. The current episode started in the past 7 days (4 days ago). The problem occurs constantly. The problem has been gradually worsening. The pain is at a severity of 7/10. The pain is moderate. Associated symptoms include a limited range of motion. Pertinent negatives include no fever, headaches, inability to bear weight, itching, joint locking, joint swelling, numbness, stiffness or tingling. She has tried acetaminophen for the symptoms. Family history does not include arthritis. Her past medical history is significant for diabetes, Injuries to Extremity and migraines.       Constitution: Negative for fever.   Musculoskeletal:  Positive for abnormal ROM of joint.   Neurological:  Negative for headaches and numbness.      Objective:     Physical Exam   Constitutional: She does not appear ill. No distress. obesity  Cardiovascular: Normal rate and regular rhythm.   Abdominal: Normal appearance.   Musculoskeletal:         General: Tenderness (left anterior shoulder. no gross deformities. difficulty with abduction) present.   Neurological: She is alert.   Nursing note and vitals reviewed.      Assessment:     1. Acute pain of left shoulder    Suspect osteoarthiritis. Trial of NSAIDs I.e voltaren and ibuprofen. RTC prn worsneing symptoms. Consider " Xray    Plan:       Acute pain of left shoulder  -     ibuprofen (ADVIL,MOTRIN) 600 MG tablet; Take 1 tablet (600 mg total) by mouth every 8 (eight) hours as needed for Pain (with food).  Dispense: 30 tablet; Refill: 0

## 2024-12-09 ENCOUNTER — OFFICE VISIT (OUTPATIENT)
Dept: FAMILY MEDICINE | Facility: CLINIC | Age: 64
End: 2024-12-09
Payer: MEDICARE

## 2024-12-09 VITALS
WEIGHT: 275.25 LBS | OXYGEN SATURATION: 97 % | BODY MASS INDEX: 45.86 KG/M2 | SYSTOLIC BLOOD PRESSURE: 116 MMHG | HEIGHT: 65 IN | HEART RATE: 82 BPM | RESPIRATION RATE: 16 BRPM | TEMPERATURE: 98 F | DIASTOLIC BLOOD PRESSURE: 70 MMHG

## 2024-12-09 DIAGNOSIS — I10 ESSENTIAL HYPERTENSION: ICD-10-CM

## 2024-12-09 DIAGNOSIS — F17.210 CIGARETTE SMOKER: Primary | ICD-10-CM

## 2024-12-09 DIAGNOSIS — M47.816 LUMBAR SPONDYLOSIS: ICD-10-CM

## 2024-12-09 DIAGNOSIS — E66.813 CLASS 3 SEVERE OBESITY WITH SERIOUS COMORBIDITY AND BODY MASS INDEX (BMI) OF 45.0 TO 49.9 IN ADULT, UNSPECIFIED OBESITY TYPE: ICD-10-CM

## 2024-12-09 DIAGNOSIS — M25.512 ACUTE PAIN OF LEFT SHOULDER: ICD-10-CM

## 2024-12-09 DIAGNOSIS — E66.01 CLASS 3 SEVERE OBESITY WITH SERIOUS COMORBIDITY AND BODY MASS INDEX (BMI) OF 45.0 TO 49.9 IN ADULT, UNSPECIFIED OBESITY TYPE: ICD-10-CM

## 2024-12-09 DIAGNOSIS — N32.81 OAB (OVERACTIVE BLADDER): ICD-10-CM

## 2024-12-09 DIAGNOSIS — E11.9 TYPE 2 DIABETES MELLITUS WITHOUT COMPLICATION, WITHOUT LONG-TERM CURRENT USE OF INSULIN: ICD-10-CM

## 2024-12-09 PROCEDURE — 1159F MED LIST DOCD IN RCRD: CPT | Mod: CPTII,S$GLB,, | Performed by: STUDENT IN AN ORGANIZED HEALTH CARE EDUCATION/TRAINING PROGRAM

## 2024-12-09 PROCEDURE — 99215 OFFICE O/P EST HI 40 MIN: CPT | Mod: S$GLB,,, | Performed by: STUDENT IN AN ORGANIZED HEALTH CARE EDUCATION/TRAINING PROGRAM

## 2024-12-09 PROCEDURE — 1101F PT FALLS ASSESS-DOCD LE1/YR: CPT | Mod: CPTII,S$GLB,, | Performed by: STUDENT IN AN ORGANIZED HEALTH CARE EDUCATION/TRAINING PROGRAM

## 2024-12-09 PROCEDURE — 3074F SYST BP LT 130 MM HG: CPT | Mod: CPTII,S$GLB,, | Performed by: STUDENT IN AN ORGANIZED HEALTH CARE EDUCATION/TRAINING PROGRAM

## 2024-12-09 PROCEDURE — 3066F NEPHROPATHY DOC TX: CPT | Mod: CPTII,S$GLB,, | Performed by: STUDENT IN AN ORGANIZED HEALTH CARE EDUCATION/TRAINING PROGRAM

## 2024-12-09 PROCEDURE — 3008F BODY MASS INDEX DOCD: CPT | Mod: CPTII,S$GLB,, | Performed by: STUDENT IN AN ORGANIZED HEALTH CARE EDUCATION/TRAINING PROGRAM

## 2024-12-09 PROCEDURE — 1160F RVW MEDS BY RX/DR IN RCRD: CPT | Mod: CPTII,S$GLB,, | Performed by: STUDENT IN AN ORGANIZED HEALTH CARE EDUCATION/TRAINING PROGRAM

## 2024-12-09 PROCEDURE — 3288F FALL RISK ASSESSMENT DOCD: CPT | Mod: CPTII,S$GLB,, | Performed by: STUDENT IN AN ORGANIZED HEALTH CARE EDUCATION/TRAINING PROGRAM

## 2024-12-09 PROCEDURE — 3078F DIAST BP <80 MM HG: CPT | Mod: CPTII,S$GLB,, | Performed by: STUDENT IN AN ORGANIZED HEALTH CARE EDUCATION/TRAINING PROGRAM

## 2024-12-09 PROCEDURE — 3044F HG A1C LEVEL LT 7.0%: CPT | Mod: CPTII,S$GLB,, | Performed by: STUDENT IN AN ORGANIZED HEALTH CARE EDUCATION/TRAINING PROGRAM

## 2024-12-09 PROCEDURE — 3061F NEG MICROALBUMINURIA REV: CPT | Mod: CPTII,S$GLB,, | Performed by: STUDENT IN AN ORGANIZED HEALTH CARE EDUCATION/TRAINING PROGRAM

## 2024-12-09 PROCEDURE — 99999 PR PBB SHADOW E&M-EST. PATIENT-LVL IV: CPT | Mod: PBBFAC,,, | Performed by: STUDENT IN AN ORGANIZED HEALTH CARE EDUCATION/TRAINING PROGRAM

## 2024-12-09 RX ORDER — LANOLIN ALCOHOL/MO/W.PET/CERES
1000 CREAM (GRAM) TOPICAL DAILY
Qty: 90 TABLET | Refills: 3 | Status: SHIPPED | OUTPATIENT
Start: 2024-12-09

## 2024-12-09 NOTE — PROGRESS NOTES
[facility] Note    Chief Complaint      Chief Complaint   Patient presents with    Follow-up on chronic conditions      History of Present Illness      Aminta Schreiber is a 64 y.o. female with sHTN, T2DM, obesity, TORIBIO, chronic allergic rhinitis, urge incontinence and h/o nephrolithiasis who presents for f/u on chronic conditions in addition c/o left shoulder pain X 5 days duration, dull aching and progressively getting better, no aasociated swelling, preceding trauma or redness. She has also been having numbness/tingling sensation on her left lower lateral thigh over the years, no associated swelling, pain, redness or preceding trauma . She is compliant with all current medications.      Problem List Addressed This Visit:    1. Acute left shoulder pain    2. Chronic allergic rhinitis     3. Hypertension associated with type 2 diabetes mellitus  Overview:  - well controlled  - continue current medications  - counseling on taking medication at the same time BID    4. Type 2 diabetes mellitus without complication, without long-term current use of insulin    - well controlled on metformin, januvia  -declined GLP 1 agonist , benefits explained in regarding weight loss benefit as well  - continue current management plan   - patient encouraged to notify me with any changes    5. Hyperlipidemia associated with type 2 diabetes mellitus    - well controlled on crestor  - continue current medication    6. Chronic rhinitis  Overview:  - well controlled  - continue current management plan   - patient encouraged to notify me with any changes    7. Class 3 severe obesity due to excess calories with serious comorbidity and body mass index (BMI) of 45.0 to 49.9 in adult  Overview:  - discussed recommendation for diet and cardiovascular exercise  - counseling on lifestyle modifications for risk factor reduction    8. Cigarette smoking  -readiness to quit 0 points  -refer tobacco program    9. Vitamin D  deficiency  Overview:  - completed D2 50,000 units weekly  - currently on D3 1000 units daily  - 03/26/2021 vitamin-D level 24  - patient is near goal recommend increase her over-the-counter D3 to 2000 units daily       Health Maintenance   Topic Date Due    Hemoglobin A1c  09/15/2024    RSV Vaccine (Age 60+ and Pregnant patients) (1 - Risk 60-74 years 1-dose series) 12/09/2024 (Originally 6/14/2019)    COVID-19 Vaccine (6 - 2024-25 season) 12/09/2025 (Originally 9/1/2024)    Pneumococcal Vaccines (Age 65+) (2 of 2 - PCV) 12/09/2025 (Originally 7/20/2021)    Colorectal Cancer Screening  07/06/2027 (Originally 1960)    Diabetes Urine Screening  03/15/2025    Lipid Panel  03/15/2025    Foot Exam  03/26/2025    Eye Exam  08/05/2025    Mammogram  09/27/2025    LDCT Lung Screen  10/07/2025    High Dose Statin  12/09/2025    DEXA Scan  10/15/2027    TETANUS VACCINE  08/02/2033    Hepatitis C Screening  Completed    Shingles Vaccine  Completed    Influenza Vaccine  Completed    HIV Screening  Completed       Past Medical History:   Diagnosis Date    Adrenal adenoma, left 12/14/2021    Atopic dermatitis 10/11/2022    Diabetes mellitus, type 2     GERD (gastroesophageal reflux disease)     Gross hematuria 04/30/2019    H/O left breast biopsy     Hyperlipidemia     Hypertension     Kidney stone     Migraines     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 Arevalo - 12/14/21 CT abd: Bilateral fat attenuation adrenal masses consistent with benign myelolipomas,, 4.1 cm on the right and 5.0 cm on the left.    Nephrolithiasis 07/24/2018    Personal history of colonic polyps 01/15/2014    Raynaud's disease     Renal calculus, right 11/23/2015    Renal colic on right side 11/23/2015    Right ureteral calculus 04/30/2019    Unspecified glaucoma     y-2    Urinary tract infection     Vaginal infection        Past Surgical History:   Procedure Laterality Date    BREAST CYST EXCISION       "EXTRACORPOREAL SHOCK WAVE LITHOTRIPSY Right 08/09/2018    Procedure: LITHOTRIPSY, ESWL;  Surgeon: Milton Garland MD;  Location: [facility] OR;  Service: Urology;  Laterality: Right;    EXTRACORPOREAL SHOCK WAVE LITHOTRIPSY Right 05/02/2019    Procedure: LITHOTRIPSY, ESWL;  Surgeon: Milton Garland MD;  Location: [facility] OR;  Service: Urology;  Laterality: Right;    EXTRACORPOREAL SHOCK WAVE LITHOTRIPSY Right 07/25/2019    Procedure: LITHOTRIPSY, ESWL;  Surgeon: Milton Garland MD;  Location: [facility] OR;  Service: Urology;  Laterality: Right;    HYSTERECTOMY      Partial    LIPOMA RESECTION Right 06/07/2022    Procedure: EXCISION, LIPOMA;  Surgeon: Odell Escamilla Jr., MD;  Location: [facility] OR;  Service: Orthopedics;  Laterality: Right;  include nerve exploration    LITHOTRIPSY      x 3 or 4 per pt       family history includes Bone cancer in her son; Breast cancer in her son; Cancer in her father and son; Diabetes in her mother; Early death in her paternal grandmother; Hypertension in her father, maternal aunt, mother, son, and son; Liver cancer in her son; Lung cancer in her son; Migraines in her daughter; No Known Problems in her brother and son; Throat cancer in her father.    Social History     Tobacco Use    Smoking status: Every Day     Current packs/day: 0.50     Average packs/day: 0.5 packs/day for 50.9 years (25.5 ttl pk-yrs)     Types: Cigarettes     Start date: 1974     Passive exposure: Current    Smokeless tobacco: Never    Tobacco comments:     Still smoking; refused smoking cessation program   Substance Use Topics    Alcohol use: Yes     Alcohol/week: 1.0 standard drink of alcohol     Types: 1 Standard drinks or equivalent per week     Comment: 'on occasion"    Drug use: Never       Review of Systems   Constitutional:  Negative for fatigue and fever.   Respiratory:  Negative for cough and chest tightness.    Gastrointestinal:  Negative for abdominal pain, diarrhea and vomiting.   Endocrine: Negative for " polydipsia and polyphagia.   Genitourinary:  Negative for difficulty urinating, dysuria and frequency.   Musculoskeletal:  Positive for arthralgias and back pain. Negative for gait problem and joint swelling.   Skin:  Positive for rash.   Neurological:  Negative for seizures, weakness and headaches.   Psychiatric/Behavioral:  Negative for sleep disturbance.        Outpatient Encounter Medications as of 12/9/2024   Medication Sig Dispense Refill    ascorbic acid, vitamin C, (VITAMIN C) 1000 MG tablet Take 1,000 mg by mouth once daily.      cholecalciferol, vitamin D3, (VITAMIN D3) 50 mcg (2,000 unit) Cap Take 1 capsule (2,000 Units total) by mouth once daily. 90 capsule 2    co-enzyme Q-10 30 mg capsule Take 30 mg by mouth once daily.      ferrous sulfate (FEOSOL) 325 mg (65 mg iron) Tab tablet Take 1 tablet by mouth once daily.      latanoprost 0.005 % ophthalmic solution       losartan-hydrochlorothiazide 50-12.5 mg (HYZAAR) 50-12.5 mg per tablet Take 1 tablet by mouth 2 (two) times a day. 180 tablet 3    metFORMIN (GLUCOPHAGE-XR) 500 MG ER 24hr tablet Take 2 tablets (1,000 mg total) by mouth once daily. 180 tablet 3    mirabegron (MYRBETRIQ) 50 mg Tb24 Take 1 tablet (50 mg total) by mouth once daily. 90 tablet 3    moxifloxacin (VIGAMOX) 0.5 % ophthalmic solution Place 1 drop into both eyes 4 (four) times daily.      omeprazole (PRILOSEC) 40 MG capsule Take 1 capsule by mouth in the morning 90 capsule 3    rosuvastatin (CRESTOR) 10 MG tablet Take 1 tablet (10 mg total) by mouth every evening. 90 tablet 3    SITagliptin phosphate (JANUVIA) 25 MG Tab Take 1 tablet (25 mg total) by mouth once daily. 90 tablet 3    tamsulosin (FLOMAX) 0.4 mg Cap Take 1 capsule (0.4 mg total) by mouth once daily. 30 capsule 11    valACYclovir (VALTREX) 1000 MG tablet Take 1 tablet by mouth once daily.      [DISCONTINUED] cyanocobalamin (VITAMIN B-12) 1000 MCG tablet Take 100 mcg by mouth once daily.      [DISCONTINUED] ibuprofen  "(ADVIL,MOTRIN) 600 MG tablet Take 1 tablet (600 mg total) by mouth every 8 (eight) hours as needed for Pain (with food). 30 tablet 0    cyanocobalamin (VITAMIN B-12) 1000 MCG tablet Take 1 tablet (1,000 mcg total) by mouth once daily. 90 tablet 3     No facility-administered encounter medications on file as of 12/9/2024.        Review of patient's allergies indicates:  No Known Allergies    Physical Exam      Vital Signs  Temp: 98.1 °F (36.7 °C)  Temp Source: Temporal  Pulse: 82  Resp: 16  SpO2: 97 %  BP: 116/70  BP Location: Left arm  Patient Position: Sitting  Pain Score: 0-No pain  Height and Weight  Height: 5' 5" (165.1 cm)  Weight: 124.9 kg (275 lb 3.9 oz)  BSA (Calculated - sq m): 2.39 sq meters  BMI (Calculated): 45.8  Weight in (lb) to have BMI = 25: 149.9]    Physical Exam  Vitals reviewed.   Constitutional:       Appearance: Normal appearance. She is obese.   HENT:      Head: Normocephalic and atraumatic.      Right Ear: Tympanic membrane normal.      Left Ear: Tympanic membrane normal.      Mouth/Throat:      Mouth: Mucous membranes are moist.      Pharynx: Oropharynx is clear.   Eyes:      Extraocular Movements: Extraocular movements intact.      Conjunctiva/sclera: Conjunctivae normal.      Pupils: Pupils are equal, round, and reactive to light.   Cardiovascular:      Rate and Rhythm: Normal rate and regular rhythm.      Pulses: Normal pulses.      Heart sounds: Normal heart sounds.   Pulmonary:      Breath sounds: Normal breath sounds.   Abdominal:      General: Abdomen is flat. Bowel sounds are normal.      Palpations: Abdomen is soft.   Musculoskeletal:      Cervical back: Normal range of motion.      Right lower leg: No edema.      Left lower leg: No edema.   Skin:     General: Skin is warm and dry.   Neurological:      General: No focal deficit present.      Mental Status: She is alert and oriented to person, place, and time.      Sensory: No sensory deficit.   Psychiatric:         Mood and Affect: " "Mood normal.         Behavior: Behavior normal.          Laboratory:  CBC:  No results for input(s): "WBC", "RBC", "HGB", "HCT", "PLT", "MCV", "MCH", "MCHC" in the last 2160 hours.    CMP:  No results for input(s): "GLU", "CALCIUM", "ALBUMIN", "PROT", "NA", "K", "CO2", "CL", "BUN", "ALKPHOS", "ALT", "AST", "BILITOT" in the last 2160 hours.    Invalid input(s): "CREATININ"    URINALYSIS:  No results for input(s): "COLORU", "CLARITYU", "SPECGRAV", "PHUR", "PROTEINUA", "GLUCOSEU", "BILIRUBINCON", "BLOODU", "WBCU", "RBCU", "BACTERIA", "MUCUS", "NITRITE", "LEUKOCYTESUR", "UROBILINOGEN", "HYALINECASTS" in the last 2160 hours.   LIPIDS:  No results for input(s): "TSH", "HDL", "CHOL", "TRIG", "LDLCALC", "CHOLHDL", "NONHDLCHOL", "TOTALCHOLEST" in the last 2160 hours.    TSH:  No results for input(s): "TSH" in the last 2160 hours.  A1C:  No results for input(s): "HGBA1C" in the last 2160 hours.      Radiology:      Assessment/Plan     Aminta Schreiber is a 64 y.o.female with:    1. Acute left shoulder pain  -likely ligamental sprain  -improving  -c/w OTC topical cream    2. Chronic allergic rhinitis     3. Hypertension associated with type 2 diabetes mellitus  Overview:  - well controlled  - continue current medications  - counseling on taking medication at the same time BID    4. Type 2 diabetes mellitus without complication, without long-term current use of insulin    - well controlled on metformin, januvia  -declined GLP 1 agonist , benefits explained in regarding weight loss benefit as well  - continue current management plan   - patient encouraged to notify me with any changes    5. Hyperlipidemia associated with type 2 diabetes mellitus    - well controlled on crestor  - continue current medication    6. Chronic rhinitis  Overview:  - well controlled  - continue current management plan   - patient encouraged to notify me with any changes    7. Class 3 severe obesity due to excess calories with serious comorbidity and body mass " index (BMI) of 45.0 to 49.9 in adult  Overview:  - discussed recommendation for diet and cardiovascular exercise  - counseling on lifestyle modifications for risk factor reduction    8. Cigarette smoking  -readiness to quit 0 points  -refer tobacco program    9. Vitamin D deficiency  Overview:  - completed D2 50,000 units weekly  - currently on D3 1000 units daily  - 03/26/2021 vitamin-D level 24  - patient is near goal recommend increase her over-the-counter D3 to 2000 units daily      41 minutes of total time spent on the encounter, which includes face to face time and non-face to face time preparing to see the patient (eg, review of tests), Obtaining and/or reviewing separately obtained history, Documenting clinical information in the electronic or other health record, Independently interpreting results (not separately reported) and communicating results to the patient or Care coordination (not separately reported).      Rayne Mcfadden MD  Internal Medicine   [facility]

## 2024-12-22 ENCOUNTER — PATIENT MESSAGE (OUTPATIENT)
Dept: UROLOGY | Facility: CLINIC | Age: 64
End: 2024-12-22
Payer: MEDICARE

## 2024-12-24 DIAGNOSIS — R10.32 LEFT LOWER QUADRANT ABDOMINAL PAIN: Primary | ICD-10-CM

## 2024-12-24 RX ORDER — KETOROLAC TROMETHAMINE 10 MG/1
10 TABLET, FILM COATED ORAL EVERY 6 HOURS
Qty: 20 TABLET | Refills: 1 | Status: SHIPPED | OUTPATIENT
Start: 2024-12-24 | End: 2024-12-29

## 2024-12-24 RX ORDER — KETOROLAC TROMETHAMINE 10 MG/1
10 TABLET, FILM COATED ORAL EVERY 6 HOURS
Qty: 20 TABLET | Refills: 1 | Status: SHIPPED | OUTPATIENT
Start: 2024-12-24 | End: 2024-12-24

## 2025-01-07 ENCOUNTER — OFFICE VISIT (OUTPATIENT)
Dept: UROLOGY | Facility: CLINIC | Age: 65
End: 2025-01-07
Payer: MEDICARE

## 2025-01-07 VITALS
SYSTOLIC BLOOD PRESSURE: 145 MMHG | WEIGHT: 275.81 LBS | HEART RATE: 94 BPM | DIASTOLIC BLOOD PRESSURE: 70 MMHG | HEIGHT: 65 IN | BODY MASS INDEX: 45.95 KG/M2

## 2025-01-07 DIAGNOSIS — M54.50 ACUTE LEFT-SIDED LOW BACK PAIN WITHOUT SCIATICA: Primary | ICD-10-CM

## 2025-01-07 PROCEDURE — 99999 PR PBB SHADOW E&M-EST. PATIENT-LVL IV: CPT | Mod: PBBFAC,,, | Performed by: NURSE PRACTITIONER

## 2025-01-07 PROCEDURE — 1159F MED LIST DOCD IN RCRD: CPT | Mod: CPTII,S$GLB,, | Performed by: NURSE PRACTITIONER

## 2025-01-07 PROCEDURE — 3288F FALL RISK ASSESSMENT DOCD: CPT | Mod: CPTII,S$GLB,, | Performed by: NURSE PRACTITIONER

## 2025-01-07 PROCEDURE — 99213 OFFICE O/P EST LOW 20 MIN: CPT | Mod: S$GLB,,, | Performed by: NURSE PRACTITIONER

## 2025-01-07 PROCEDURE — 3077F SYST BP >= 140 MM HG: CPT | Mod: CPTII,S$GLB,, | Performed by: NURSE PRACTITIONER

## 2025-01-07 PROCEDURE — 3008F BODY MASS INDEX DOCD: CPT | Mod: CPTII,S$GLB,, | Performed by: NURSE PRACTITIONER

## 2025-01-07 PROCEDURE — 1101F PT FALLS ASSESS-DOCD LE1/YR: CPT | Mod: CPTII,S$GLB,, | Performed by: NURSE PRACTITIONER

## 2025-01-07 PROCEDURE — 1160F RVW MEDS BY RX/DR IN RCRD: CPT | Mod: CPTII,S$GLB,, | Performed by: NURSE PRACTITIONER

## 2025-01-07 PROCEDURE — 3078F DIAST BP <80 MM HG: CPT | Mod: CPTII,S$GLB,, | Performed by: NURSE PRACTITIONER

## 2025-01-07 NOTE — PATIENT INSTRUCTIONS
Continue taking mirabegron (Myrbetriq) 50 mg daily for management of LUTS.  Continue taking tamsulosin (Flomax) 0.4 mg daily to promote bladder emptying.   Follow-up as needed.

## 2025-01-07 NOTE — PROGRESS NOTES
Subjective:       Patient ID: Aminta Schreiber is a 64 y.o. female.    Chief Complaint: Follow-up (Followup/started a new meds)    Patient is here today for a follow-up for left lower back pain. She reports her pain has resolved. She last experienced it 3 days ago. Pain occurred when she was bending down picking up something.     Follow-up  This is a new problem. Episode onset: 4 days ago. The problem has been resolved. Associated symptoms include urinary symptoms. Pertinent negatives include no abdominal pain, change in bowel habit, chills, fever, nausea, swollen glands, vomiting or weakness. Nothing aggravates the symptoms. Treatments tried: toradol. The treatment provided significant relief.     Review of Systems   Constitutional:  Negative for chills and fever.   Gastrointestinal:  Negative for abdominal pain, change in bowel habit, nausea and vomiting.   Genitourinary:  Positive for nocturia (x2). Negative for decreased urine volume, difficulty urinating, dysuria, flank pain and hematuria.   Musculoskeletal:  Positive for back pain (left; resolved).   Neurological:  Negative for weakness.   Psychiatric/Behavioral: Negative.           Objective:      Physical Exam  Vitals and nursing note reviewed.   Constitutional:       General: She is not in acute distress.     Appearance: She is well-developed. She is not ill-appearing.   HENT:      Head: Normocephalic and atraumatic.   Eyes:      Pupils: Pupils are equal, round, and reactive to light.   Cardiovascular:      Rate and Rhythm: Normal rate.   Pulmonary:      Effort: Pulmonary effort is normal. No respiratory distress.   Abdominal:      Palpations: Abdomen is soft.      Tenderness: There is no abdominal tenderness.   Musculoskeletal:         General: Normal range of motion.      Cervical back: Normal range of motion.        Back:    Skin:     General: Skin is warm and dry.   Neurological:      Mental Status: She is alert and oriented to person, place, and time.       Coordination: Coordination normal.   Psychiatric:         Mood and Affect: Mood normal.         Behavior: Behavior normal.         Thought Content: Thought content normal.         Judgment: Judgment normal.         Assessment:       Problem List Items Addressed This Visit    None  Visit Diagnoses       Acute left-sided low back pain without sciatica    -  Primary            Plan:           Aminta was seen today for follow-up.    Diagnoses and all orders for this visit:    Acute left-sided low back pain without sciatica    Other orders  Continue taking mirabegron (Myrbetriq) 50 mg daily for management of LUTS.  Continue taking tamsulosin (Flomax) 0.4 mg daily to promote bladder emptying.     Follow-up as needed.     Kendra Batista, DNP

## 2025-03-11 ENCOUNTER — PATIENT MESSAGE (OUTPATIENT)
Dept: FAMILY MEDICINE | Facility: CLINIC | Age: 65
End: 2025-03-11
Payer: MEDICARE

## 2025-03-12 NOTE — TELEPHONE ENCOUNTER
Pt scheduled with JOSEFINA Sandoval due to scheduling and the need to be seen sooner, 03/14/25 10:30am.

## 2025-03-14 ENCOUNTER — OFFICE VISIT (OUTPATIENT)
Dept: FAMILY MEDICINE | Facility: CLINIC | Age: 65
End: 2025-03-14
Payer: MEDICARE

## 2025-03-14 VITALS
BODY MASS INDEX: 46.53 KG/M2 | DIASTOLIC BLOOD PRESSURE: 70 MMHG | OXYGEN SATURATION: 97 % | HEART RATE: 92 BPM | WEIGHT: 279.31 LBS | HEIGHT: 65 IN | SYSTOLIC BLOOD PRESSURE: 130 MMHG

## 2025-03-14 DIAGNOSIS — I15.2 HYPERTENSION ASSOCIATED WITH TYPE 2 DIABETES MELLITUS: ICD-10-CM

## 2025-03-14 DIAGNOSIS — E11.9 TYPE 2 DIABETES MELLITUS WITHOUT COMPLICATION, WITHOUT LONG-TERM CURRENT USE OF INSULIN: ICD-10-CM

## 2025-03-14 DIAGNOSIS — F32.1 CURRENT MODERATE EPISODE OF MAJOR DEPRESSIVE DISORDER WITHOUT PRIOR EPISODE: ICD-10-CM

## 2025-03-14 DIAGNOSIS — E11.59 HYPERTENSION ASSOCIATED WITH TYPE 2 DIABETES MELLITUS: ICD-10-CM

## 2025-03-14 DIAGNOSIS — F43.21 GRIEF AT LOSS OF CHILD: Primary | ICD-10-CM

## 2025-03-14 DIAGNOSIS — F41.9 ANXIETY: ICD-10-CM

## 2025-03-14 DIAGNOSIS — Z63.4 GRIEF AT LOSS OF CHILD: Primary | ICD-10-CM

## 2025-03-14 PROCEDURE — 99999 PR PBB SHADOW E&M-EST. PATIENT-LVL V: CPT | Mod: PBBFAC,,,

## 2025-03-14 SDOH — SOCIAL DETERMINANTS OF HEALTH (SDOH): DISSAPEARANCE AND DEATH OF FAMILY MEMBER: Z63.4

## 2025-03-14 NOTE — PROGRESS NOTES
Subjective:              Chief Complaint: referral to Psych     Aminta Schreiber is a 64 y.o. female, patient of Rayne Mcfadden MD with T2DM, GERD, HLD, HTY, Raynaud's, adrenal myelolipoma, OAB, chronic low back pain, vit D deficiency, nicotine dependence, obesity,   unknown to me.     History of Present Illness    CHIEF COMPLAINT:  Aminta presents today for grief management. She is accompanied by her spouse.     RECENT LOSS AND LIVING SITUATION:  She reports the loss of her 47-year-old son on December 1st due to male breast cancer recurrence while under hospice care. Her son and daughter in law came to live with her  and her about a year ago for him to receive cancer treatment. They were residing in [place]. Her her daughter-in-law is currently still living with her and her spouse; daughter-in-law recently required psychiatric hospitalization for being a potential threat to the family/herself. She expresses that the daughter-in-law's constant focus on personal crises and emotional demands is causing significant agitation, overshadowing her own grief process for losing her second child. She would like to get a referral to a therapist.     MENTAL HEALTH:  She denies history of anxiety or depression medication use, stating she never felt the need. She denies anxiety attacks, chest pain, or shortness of breath.    DIET:  She reports poor appetite with irregular eating patterns, primarily consuming breakfast and dinner when prepared by her . She acknowledges increased consumption of junk food but expresses minimal concern about this dietary change.    ALLERGIES:  No known allergies.    ROS:  General: -fever, -chills, -fatigue, -weight gain, -weight loss  Eyes: -vision changes, -redness, -discharge  ENT: -ear pain, -nasal congestion, -sore throat  Cardiovascular: -chest pain, -palpitations, -lower extremity edema  Respiratory: -cough, -shortness of breath  Gastrointestinal: -abdominal pain, -nausea, -vomiting,  "-diarrhea, -constipation, -blood in stool, +loss of appetite  Genitourinary: -dysuria, -hematuria, -frequency  Musculoskeletal: -joint pain, -muscle pain  Skin: -rash, -lesion  Neurological: -headache, -dizziness, -numbness, -tingling  Psychiatric: +anxiety, +depression, -sleep difficulty, +agitation              Objective:     Vitals:    03/14/25 1028 03/14/25 1124   BP: (!) 140/60 130/70   BP Location: Left arm Left arm   Patient Position: Sitting    Pulse: 92    SpO2: 97%    Weight: 126.7 kg (279 lb 5.2 oz)    Height: 5' 5" (1.651 m)           Physical Exam    General: No acute distress. Well-developed. Well-nourished.  Eyes: EOMI. Sclerae reddened.   HENT: Normocephalic. Atraumatic.   Cardiovascular: Regular rate. Regular rhythm. No murmurs. No rubs. No gallops. Normal S1, S2.  Respiratory: Normal respiratory effort. Clear to auscultation bilaterally. No rales. No rhonchi. No wheezing.  Musculoskeletal: No  obvious deformity.  Extremities: No lower extremity edema.  Neurological: Alert & oriented x3. No slurred speech. Normal gait.  Psychiatric: Normal mood.   Skin: Warm. Dry. No rash.                  Laboratory:  CBC:  No results for input(s): "WBC", "RBC", "HGB", "HCT", "PLT", "MCV", "MCH", "MCHC" in the last 2160 hours.  CMP:  No results for input(s): "GLU", "CALCIUM", "ALBUMIN", "PROT", "NA", "K", "CO2", "CL", "BUN", "ALKPHOS", "ALT", "AST", "BILITOT" in the last 2160 hours.    Invalid input(s): "CREATININ"  URINALYSIS:  No results for input(s): "COLORU", "CLARITYU", "SPECGRAV", "PHUR", "PROTEINUA", "GLUCOSEU", "BILIRUBINCON", "BLOODU", "WBCU", "RBCU", "BACTERIA", "MUCUS", "NITRITE", "LEUKOCYTESUR", "UROBILINOGEN", "HYALINECASTS" in the last 2160 hours.   LIPIDS:  No results for input(s): "TSH", "HDL", "CHOL", "TRIG", "LDLCALC", "CHOLHDL", "NONHDLCHOL", "TOTALCHOLEST" in the last 2160 hours.  TSH:  No results for input(s): "TSH" in the last 2160 hours.  A1C:  No results for input(s): "HGBA1C" in the last " 2160 hours.    Assessment:         ICD-10-CM ICD-9-CM   1. Grief at loss of child  F43.21 309.0    Z63.4    2. Current moderate episode of major depressive disorder without prior episode  F32.1 296.22   3. Anxiety  F41.9 300.00   4. Hypertension associated with type 2 diabetes mellitus  E11.59 250.80    I15.2 401.9   5. Type 2 diabetes mellitus without complication, without long-term current use of insulin  E11.9 250.00       Plan:       Grief at loss of child  -     Ambulatory referral/consult to Psychiatry; Future; Expected date: 03/21/2025  See plan below.     Current moderate episode of major depressive disorder without prior episode  Anxiety  R/t grief, loss of son. See plan below.     Hypertension associated with type 2 diabetes mellitus  Controlled with losartan-hctz. Continue as prescribed.     Type 2 diabetes mellitus without complication, without long-term current use of insulin  See plan below.     Assessment & Plan    TYPE 2 DIABETES MELLITUS WITH OTHER SPECIFIED COMPLICATION:  - Evaluated patient's A1c, which was found to be good.  - Advised patient to monitor blood sugar more regularly, especially when feeling symptomatic, and to check blood sugar more frequently.  - Instructed patient to eat at least 2 meals per day and emphasized the importance of maintaining regular meals while taking diabetes medications to prevent hypoglycemia.  - Continued Metformin and Januvia for diabetes management.    GRIEF MANAGEMENT (DEATH OF FAMILY MEMBER):  - Assessed patient's grief and agitation following the recent loss of her 47-year-old son to cancer on December 1st.  - Acknowledged the difficulty of the situation and recommended therapy for grief management.  - discussed medication to help; Aminta prefers to defer medication at this time.  - Explained potential benefits of therapy and referred patient to a therapist (s/w therapy staff in clinic to assist with obtaining an appointment ASAP), with an appointment  scheduled for Monday 03/18/2025.     DEPRESSION MANAGEMENT:  - Screened and evaluated patient, indicating the presence of depression. PHQ-9 score 11.   -defer medication at this time  -scheduled for therapy on Monday.       ANXIETY MANAGEMENT:  - Screened and evaluated patient, indicating the presence of anxiety, APPLE-7 score 9.  - Assessed need for medication management and checked for symptoms of anxiety attacks.  - Scheduled an appointment with a therapist for anxiety management.  - Offered option of low-dose anxiety medication if needed in the future.      GENERAL RECOMMENDATIONS:  - Encouraged patient to resume regular social activities, such as monthly outings with cousin, and continue attending [facility] meetings.  - Advised eating at least 2 times daily, even if just a protein shake or smoothie.  - Recommend continuing outdoor activities as they help with mood.  - Encouraged patient to read Oriental orthodox texts and maintain eliane practices.    FOLLOW-UP:  - Follow up on the 18th (next Friday) for therapist appointment as scheduled.          If symptoms worsen, go to ER.  If symptoms do not improve, return to clinic.   Keep appointments with all specialists.     Patient verbalizes understanding and agrees with current treatment plan.      Follow up if symptoms worsen or fail to improve.     Patient's Medications   New Prescriptions    No medications on file   Previous Medications    ASCORBIC ACID, VITAMIN C, (VITAMIN C) 1000 MG TABLET    Take 1,000 mg by mouth once daily.    CHOLECALCIFEROL, VITAMIN D3, (VITAMIN D3) 50 MCG (2,000 UNIT) CAP    Take 1 capsule (2,000 Units total) by mouth once daily.    CO-ENZYME Q-10 30 MG CAPSULE    Take 30 mg by mouth once daily.    CYANOCOBALAMIN (VITAMIN B-12) 1000 MCG TABLET    Take 1 tablet (1,000 mcg total) by mouth once daily.    FERROUS SULFATE (FEOSOL) 325 MG (65 MG IRON) TAB TABLET    Take 1 tablet by mouth once daily.    LATANOPROST 0.005 % OPHTHALMIC SOLUTION         LOSARTAN-HYDROCHLOROTHIAZIDE 50-12.5 MG (HYZAAR) 50-12.5 MG PER TABLET    Take 1 tablet by mouth 2 (two) times a day.    METFORMIN (GLUCOPHAGE-XR) 500 MG ER 24HR TABLET    Take 2 tablets (1,000 mg total) by mouth once daily.    MIRABEGRON (MYRBETRIQ) 50 MG TB24    Take 1 tablet (50 mg total) by mouth once daily.    MOXIFLOXACIN (VIGAMOX) 0.5 % OPHTHALMIC SOLUTION    Place 1 drop into both eyes 4 (four) times daily.    OMEPRAZOLE (PRILOSEC) 40 MG CAPSULE    Take 1 capsule by mouth in the morning    ROSUVASTATIN (CRESTOR) 10 MG TABLET    Take 1 tablet (10 mg total) by mouth every evening.    SITAGLIPTIN PHOSPHATE (JANUVIA) 25 MG TAB    Take 1 tablet (25 mg total) by mouth once daily.    TAMSULOSIN (FLOMAX) 0.4 MG CAP    Take 1 capsule (0.4 mg total) by mouth once daily.    VALACYCLOVIR (VALTREX) 1000 MG TABLET    Take 1 tablet by mouth once daily.   Modified Medications    No medications on file   Discontinued Medications    No medications on file       I spent a total of 41 minutes on the day of the visit.  This includes face to face time and non-face to face time preparing to see the patient (eg, review of tests), obtaining and/or reviewing separately obtained history, documenting clinical information in the electronic or other health record, independently interpreting results and communicating results to the patient/family/caregiver, or care coordinator.      This note was generated with the assistance of ambient listening technology. Verbal consent was obtained by the patient and accompanying visitor(s) for the recording of patient appointment to facilitate this note. I attest to having reviewed and edited the generated note for accuracy, though some syntax or spelling errors may persist. Please contact the author of this note for any clarification.         Jenny Garcia NP

## 2025-03-17 ENCOUNTER — OFFICE VISIT (OUTPATIENT)
Dept: PSYCHIATRY | Facility: CLINIC | Age: 65
End: 2025-03-17
Payer: MEDICARE

## 2025-03-17 DIAGNOSIS — F43.23 ADJUSTMENT DISORDER WITH MIXED ANXIETY AND DEPRESSED MOOD: Primary | ICD-10-CM
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
## 2025-03-19 DIAGNOSIS — E11.9 TYPE 2 DIABETES MELLITUS WITHOUT COMPLICATION: ICD-10-CM

## 2025-03-20 NOTE — PROGRESS NOTES
"Individual Psychotherapy (PhD/LCSW)    3/24/2025    Site:  [facility]       Therapeutic Intervention: Met with patient.  Outpatient - Supportive psychotherapy 60 min - CPT Code 81019    Chief complaint/reason for encounter: depression and grief     Interval history and content of current session: Patient was was last seen for her initial evaluation 1 week ago. Began today's session with a check in. She reports that she had a good weekend as her daughter-in-law stayed with her own mother this weekend, and she was able to spend quality time with her  without having to worry about her. She is preparing for their 20th anniversary party this upcoming weekend and is looking forward to it. States that focusing on the planning/preparation for that has kept her busy and her mind off of her son. States that for today, she wants to remain there as she is feeling "light" right now. Reports she spoke to her other son this past weekend and gained some insight into her feelings toward her daughter-in-law. States that her son made her realize that some of her feelings/reactions towards her daughter-in-law may be intensified (I.e., anger, frustration) because of issues she and her son had in their marriage. We explored this further, and patient concluded that she is approaching the point of having a dialogue with her daughter-in-law. Primarily addressing boundary issues as it relates to living with them and seeking emotional support from them. We spent time in session today beginning to explore therapeutic goals for addressing grief. We processed what her life would look like if she were able to effectively process her grief.     Treatment plan:  Target symptoms: depression, adjustment, grief  Why chosen therapy is appropriate versus another modality: relevant to diagnosis, patient responds to this modality, evidence based practice  Outcome monitoring methods: self-report, observation  Therapeutic intervention type: " supportive psychotherapy    Risk parameters:  Patient reports no suicidal ideation  Patient reports no homicidal ideation  Patient reports no self-injurious behavior  Patient reports no violent behavior    Verbal deficits: None    Patient's response to intervention:  The patient's response to intervention is accepting, motivated.    Progress toward goals and other mental status changes:  The patient's progress toward goals is limited.    Diagnosis:     ICD-10-CM ICD-9-CM   1. Adjustment disorder with mixed anxiety and depressed mood  F43.23 309.28       Plan:  individual psychotherapy    Return to clinic: 1 week    Length of Service (minutes): 60

## 2025-03-24 ENCOUNTER — OFFICE VISIT (OUTPATIENT)
Dept: PSYCHIATRY | Facility: CLINIC | Age: 65
End: 2025-03-24
Payer: MEDICARE

## 2025-03-24 DIAGNOSIS — F43.23 ADJUSTMENT DISORDER WITH MIXED ANXIETY AND DEPRESSED MOOD: Primary | ICD-10-CM

## 2025-03-24 PROCEDURE — 90837 PSYTX W PT 60 MINUTES: CPT | Mod: S$GLB,,,

## 2025-04-01 ENCOUNTER — OFFICE VISIT (OUTPATIENT)
Dept: PSYCHIATRY | Facility: CLINIC | Age: 65
End: 2025-04-01
Payer: COMMERCIAL

## 2025-04-01 DIAGNOSIS — F43.23 ADJUSTMENT DISORDER WITH MIXED ANXIETY AND DEPRESSED MOOD: Primary | ICD-10-CM

## 2025-04-01 PROCEDURE — 90837 PSYTX W PT 60 MINUTES: CPT | Mod: S$GLB,,,

## 2025-04-01 NOTE — PROGRESS NOTES
"Individual Psychotherapy (PhD/LCSW)    4/1/2025    Site:  [facility]        Therapeutic Intervention: Met with patient.  Outpatient - Supportive psychotherapy 60 min - CPT Code 43434    Chief complaint/reason for encounter: depression and grief     Interval history and content of current session: Patient was last seen for a session 1 week ago. Began session today with a check in. Reports that she, her , and their family had a great time together at their anniversary party this past weekend. Her mother-in-law is in town until the end of this week, so this has forced her to get out of the house over the last few days to sight-see and have dinner. Her daughter-in-law returned home last week after visiting her mother. Reports that there has been an improvement in their dynamic at home over the last week. Her daughter-in-law reached out to patient's daughter, and patient's daughter set the boundary with her that patient has not felt comfortable setting. We discussed this; she reports that she has a desire to communicate boundaries with her daughter-in-law, but she is not in a place emotionally where she feels she can do so without becoming increasingly angry and saying something hurtful. It appears her daughter-in-law is making plans to pursue work and save up to get her own apartment, which brings patient a sense of relief. We touched more on grief in today's session. Reports she was reading handouts on grief provided to her son by his therapist recently. We discussed what resonated with her, particularly the information about internalizing and the impacts of not addressing grief. States she is unsure how to begin grieving as she doesn't believe she has ever done so before. It was expected with past losses that she push her thoughts/feelings aside to be "strong" for others. Processed the consequences of this for her.    Treatment plan:  Target symptoms: depression, adjustment, grief, interpersonal stress  Why " chosen therapy is appropriate versus another modality: relevant to diagnosis, patient responds to this modality, evidence based practice  Outcome monitoring methods: self-report, observation  Therapeutic intervention type: supportive psychotherapy    Risk parameters:  Patient reports no suicidal ideation  Patient reports no homicidal ideation  Patient reports no self-injurious behavior  Patient reports no violent behavior    Verbal deficits: None    Patient's response to intervention:  The patient's response to intervention is accepting, motivated.    Progress toward goals and other mental status changes:  The patient's progress toward goals is fair .    Diagnosis:     ICD-10-CM ICD-9-CM   1. Adjustment disorder with mixed anxiety and depressed mood  F43.23 309.28       Plan:  individual psychotherapy    Return to clinic: 1 week    Length of Service (minutes): 60

## 2025-04-07 ENCOUNTER — OFFICE VISIT (OUTPATIENT)
Dept: PSYCHIATRY | Facility: CLINIC | Age: 65
End: 2025-04-07
Payer: COMMERCIAL

## 2025-04-07 DIAGNOSIS — F43.23 ADJUSTMENT DISORDER WITH MIXED ANXIETY AND DEPRESSED MOOD: Primary | ICD-10-CM

## 2025-04-07 PROCEDURE — 90837 PSYTX W PT 60 MINUTES: CPT | Mod: S$GLB,,,

## 2025-04-07 NOTE — PROGRESS NOTES
Individual Psychotherapy (PhD/LCSW)    4/7/2025    Site:  [place]       Therapeutic Intervention: Met with patient.  Outpatient - Supportive psychotherapy 60 min - CPT Code 93804    Chief complaint/reason for encounter: depression and grief     Interval history and content of current session: Patient was last seen for a session 1 week ago. Began today's session by checking in. She and her  had a nice time hosting her step mother-in-law and stepson last week in the city. Reports that her daughter-in-law (DIL) will be returning to her house tonight after being gone for about a week assisting her son with childcare while his wife was hospitalized. She is not looking forward to her returning to their home and she does not plan to accompany her  in picking her up from the bus station. We revisited developing therapy goals in today's session. Discussed adding her relationship with her DIL as a concern that led her to therapy and processed more of their relationship. Identified the first time in which she noticed these feelings she has about her DIL, which began long before her son's death. She reports that over time, her negative feelings towards her have increased with every negative interaction they have had. We defined this as resentment, and she appeared to gain insight into how that has been impacting her over time. Reports that her DIL has been through some particularly difficult life experiences and she used to have empathy for her, but she is having a difficult time doing so now. Discussed bids for connection and attachment styles as it relates to her DIL, and she acknowledged that marrying into her family is likely the first time her DIL has felt welcomed, accepted, and loved. She feels she may be ready to initiate a conversation with her DIL to express her feelings and communicate her boundaries to her as avoidance of her DIL is likely not sustainable long-term.    Treatment plan:  Target  symptoms: depression, adjustment, grief, interpersonal stress  Why chosen therapy is appropriate versus another modality: relevant to diagnosis, patient responds to this modality, evidence based practice  Outcome monitoring methods: self-report, observation  Therapeutic intervention type: supportive psychotherapy    Risk parameters:  Patient reports no suicidal ideation  Patient reports no homicidal ideation  Patient reports no self-injurious behavior  Patient reports no violent behavior    Verbal deficits: None    Patient's response to intervention:  The patient's response to intervention is accepting, motivated.    Progress toward goals and other mental status changes:  The patient's progress toward goals is fair .    Diagnosis:     ICD-10-CM ICD-9-CM   1. Adjustment disorder with mixed anxiety and depressed mood  F43.23 309.28       Plan:  individual psychotherapy and medication management by physician    Return to clinic: 1 week    Length of Service (minutes): 60

## 2025-04-11 NOTE — PROGRESS NOTES
"Individual Psychotherapy (PhD/LCSW)    4/14/2025    Site:  [place]        Therapeutic Intervention: Met with patient.  Outpatient - Supportive psychotherapy 60 min - CPT Code 45092    Chief complaint/reason for encounter: depression and grief     Interval history and content of current session: Patient was last seen for a session 1 week ago. We revisited therapeutic goals, and she identified a desire to address emotional numbness first. States that she often feels "indifferent"/apathetic towards others' emotions/issues, and she would like to be more compassionate and "empathetic". Believes the barrier is being disconnected from her own emotions as result of being encouraged to "suppress" her thoughts/feelings in childhood. Discussed the impact this has had on her. States that the women in her family have "always" been strong, which she equates with stoic, not outwardly expressing emotion, etc. We explored her definitions of "strong" vs "weak" as it relates to this, and we discussed alternative perspectives on this. States that she "needs" to talk about some of the things she experienced throughout her lifetime, but she is unsure of where to begin as she is used to being told what to do, how to do it, what she should be doing. Acknowledged discomfort with being given the space to think/make decisions for herself in our sessions. Began discussing her experience with being kicked out of her home when she was teenager (discussed in initial evaluation). Processed feelings of anger, frustration, and resentment related to this experience. A follow up session was scheduled to revisit this.    Treatment plan:  Target symptoms: depression, adjustment, grief, interpersonal stress  Why chosen therapy is appropriate versus another modality: relevant to diagnosis, patient responds to this modality, evidence based practice  Outcome monitoring methods: self-report, observation, checklist/rating scale  Therapeutic intervention " type: supportive psychotherapy    Risk parameters:  Patient reports no suicidal ideation  Patient reports no homicidal ideation  Patient reports no self-injurious behavior  Patient reports no violent behavior    Verbal deficits: None    Patient's response to intervention:  The patient's response to intervention is accepting, motivated.    Progress toward goals and other mental status changes:  The patient's progress toward goals is fair .    Diagnosis:     ICD-10-CM ICD-9-CM   1. Adjustment disorder with mixed anxiety and depressed mood  F43.23 309.28       Plan:  individual psychotherapy and medication management by physician    Return to clinic: 1 week    Length of Service (minutes): 60    PHQ-9:  1.  Little interest or pleasure in doing things: Several days                = 1   2.  Feeling down, depressed or hopeless: Several days                = 1   3.  Trouble falling or staying asleep, or sleeping too much: Several days                = 1   4.  Feeling tired or having little energy: Several days                = 1   5.  Poor appetite or overeating: More than half of days  = 2   6.  Feeling bad about yourself - or that you are a failure or have let yourself or your family down: Several days                = 1   7.  Trouble concentrating on things, such as reading the newspaper or watching television: More than half of days  = 2   8.  Moving or speaking so slowly that other people could have noticed.  Or the opposite - being fidgety or restless that you have been moving around a lot more than usual: Not at all                       = 0   9.  Thoughts that you would be better off dead, or of hurting yourself: Not at all                       = 0    PHQ-9 Total:  9   If you checked off any problems, how difficult have these made it for you to do your work, take care of things at home, or get along with other people? Somewhat difficult    APPLE-7:  Not at all-0 Several days-1 Over half the days-2 Nearly every day-3    1. Feeling nervous, anxious, or on edge. 1  2. Not being able to stop or control worrying. 1  3. Worrying too much about different things. 1  4. Trouble relaxing. 1  5. Being so restless that it's hard to sit still. 0  6.Becoming easily annoyed or irritable. 1  7. Feeling afraid as if something awful might happen. 1  Total Score: 6  If you checked off any problems, how difficult have these made it for you to do your work, take care of things at home, or get along with other people? Somewhat difficult

## 2025-04-14 ENCOUNTER — OFFICE VISIT (OUTPATIENT)
Dept: PSYCHIATRY | Facility: CLINIC | Age: 65
End: 2025-04-14
Payer: MEDICARE

## 2025-04-14 DIAGNOSIS — F43.23 ADJUSTMENT DISORDER WITH MIXED ANXIETY AND DEPRESSED MOOD: Primary | ICD-10-CM

## 2025-04-15 ENCOUNTER — PATIENT MESSAGE (OUTPATIENT)
Dept: UROLOGY | Facility: CLINIC | Age: 65
End: 2025-04-15
Payer: MEDICARE

## 2025-04-28 ENCOUNTER — OFFICE VISIT (OUTPATIENT)
Dept: PSYCHIATRY | Facility: CLINIC | Age: 65
End: 2025-04-28
Payer: MEDICARE

## 2025-04-28 DIAGNOSIS — F43.23 ADJUSTMENT DISORDER WITH MIXED ANXIETY AND DEPRESSED MOOD: Primary | ICD-10-CM

## 2025-04-28 PROCEDURE — 90837 PSYTX W PT 60 MINUTES: CPT | Mod: S$GLB,,,

## 2025-04-29 NOTE — PROGRESS NOTES
"Individual Psychotherapy (PhD/LCSW)    4/28/2025    Site:  [place]       Therapeutic Intervention: Met with patient.  Outpatient - Supportive psychotherapy 60 min - CPT Code 70521    Chief complaint/reason for encounter: depression and grief     Interval history and content of current session: Patient was last seen for a session 2 weeks ago. Began today's session with a check in. Reports that she had an interaction with her daughter-in-law recently where she expressed her dissatisfaction with what she perceived as selfishness on her daughter-in-law's part. We discussed how this conversation went, and provider challenged patient to consider what it might look like to have a direct conversation with her daughter-in-law where she assertively expresses her feelings to her. States she is not ready to have that conversation still as she is concerned for her daughter-in-law's emotional stability at this time. She has tried to speak to her in the past but it was not productive as daughter-in-law cried the entire time. Views her daughter-in-law's crying as a manipulation tactic, which angers her. Aside from this relationship, she indicates that there have been other improvements on her end in the last 2 weeks. She signed up to be on the event coordination committee for her Jesse qiu in an effort to be more active and get out of the house. She recently helped plan an event and has upcoming ones she will assist with; reports she is enjoying this. States she feels less "numb", has experienced a decreased in her anxiety, and is worrying less about her children. We revisited patient's established therapy goals, and she reports a desire to begin addressing grief in our follow up sessions.     Treatment plan:  Target symptoms: depression, adjustment, grief, interpersonal stress/conflict  Why chosen therapy is appropriate versus another modality: relevant to diagnosis, patient responds to this modality, evidence based " practice  Outcome monitoring methods: self-report, observation  Therapeutic intervention type: supportive psychotherapy    Risk parameters:  Patient reports no suicidal ideation  Patient reports no homicidal ideation  Patient reports no self-injurious behavior  Patient reports no violent behavior    Verbal deficits: None    Patient's response to intervention:  The patient's response to intervention is accepting.    Progress toward goals and other mental status changes:  The patient's progress toward goals is fair .    Diagnosis:     ICD-10-CM ICD-9-CM   1. Adjustment disorder with mixed anxiety and depressed mood  F43.23 309.28       Plan:  individual psychotherapy and medication management by physician    Return to clinic: 2 weeks    Length of Service (minutes): 60

## 2025-04-30 ENCOUNTER — PATIENT MESSAGE (OUTPATIENT)
Dept: RESEARCH | Facility: HOSPITAL | Age: 65
End: 2025-04-30
Payer: MEDICARE

## 2025-04-30 ENCOUNTER — PATIENT MESSAGE (OUTPATIENT)
Dept: UROLOGY | Facility: CLINIC | Age: 65
End: 2025-04-30
Payer: MEDICARE

## 2025-05-01 RX ORDER — TAMSULOSIN HYDROCHLORIDE 0.4 MG/1
0.4 CAPSULE ORAL DAILY
Qty: 30 CAPSULE | Refills: 11 | Status: SHIPPED | OUTPATIENT
Start: 2025-05-01 | End: 2026-05-01

## 2025-05-13 ENCOUNTER — OFFICE VISIT (OUTPATIENT)
Dept: PSYCHIATRY | Facility: CLINIC | Age: 65
End: 2025-05-13
Payer: MEDICARE

## 2025-05-13 DIAGNOSIS — F43.23 ADJUSTMENT DISORDER WITH MIXED ANXIETY AND DEPRESSED MOOD: Primary | ICD-10-CM

## 2025-05-16 ENCOUNTER — PATIENT MESSAGE (OUTPATIENT)
Dept: RESEARCH | Facility: CLINIC | Age: 65
End: 2025-05-16
Payer: MEDICARE

## 2025-05-16 ENCOUNTER — TELEPHONE (OUTPATIENT)
Dept: RESEARCH | Facility: CLINIC | Age: 65
End: 2025-05-16
Payer: MEDICARE

## 2025-05-16 NOTE — TELEPHONE ENCOUNTER
"[employer] HEALTH SURVEY REMINDER    Pre-Call Chart Review: Copy survey link here:     Calling from the [employer] study to remind pt its time to fill out their study surveys.   We recently sent the links to your email and [facility] accounts. Once the health surveys are filled out, which will take about 15 to 30 minutes, we will send the $50 check for completion.   For Voicemail: If you have any questions, please call the [employer] study team at [facility]. Their number is  [phone] or email [email or web address].     During Call:  Do you need me to resend the survey links again?   If patient has time, pause to send to [facility] and wait for pt to confirm receipt.   Yes, resend to [facility]  *preferred email:    Share info with pt:  [] N/A -did not speak with pt  [x] [facility] will submit a check request for $50 once surveys are complete. Currently it takes the check 3 - 4 weeks to arrive, and may take longer during holidays.   [] Remember to  add [email or web address] to your email contacts because messages may end up in your Junk/Trash/Spam folders     Issues identified  FUp actions:  [x] N/A  [] "Survey has already been completed" (Save & Return)  Ask  [facility] to  create new link  [] Never received check for previous surveys  Confirm mailing address & Ask [facility] to look into it  Enter Preferred Mailing Address:  [] Other issue:     Call Result;   Spoke with pt      Follow-Up Procedures: NA  []Share Updated contact info: Send secure (encrypted) email of any issues (e.g. preferreed mailing address, best email address)  []Unable to reach/speak to: Send survey reminder message through the portal if unable to speak to pt.   "

## 2025-05-16 NOTE — PROGRESS NOTES
"Individual Psychotherapy (PhD/LCSW)    2025    Site:  [place]        Therapeutic Intervention: Met with patient.  Outpatient - Supportive psychotherapy 60 min - CPT Code 38953    Chief complaint/reason for encounter: depression and grief     Interval history and content of current session: Patient was last seen for a session 2 weeks ago. There was a recent conflict between her and her daughter-in-law. Reports that things "came to a head" when he daughter-in-law approached her with family drama, and she finally expressed to her how she felt. We discussed this; denies feeling guilt for the what she said though she recognizes it was a result of not communicating her feelings to her daughter-in-law over time. Reports she has been able to think about her son more often. She pictures his face, his smile, and thinks about happy memories with him. She shared some of these memories with me in this session and processed how she has been feeling since his death. In addition to this, we discussed the events surrounding the loss of her first son as a . She processed her thoughts and feelings around this experience. We identified the messages she received from others regarding her grief. States she was not "allowed" to grieve; she was told that she "had to be strong" for her first . Processed the implications of this and how that impacted her over time. Discussed the connection between this and limited emotional expression as it relates to the women in her family and black women overall.     Treatment plan:  Target symptoms: depression, adjustment, grief, interpersonal stress/conflict  Why chosen therapy is appropriate versus another modality: relevant to diagnosis, patient responds to this modality, evidence based practice  Outcome monitoring methods: self-report, observation  Therapeutic intervention type: supportive psychotherapy    Risk parameters:  Patient reports no suicidal ideation  Patient reports " no homicidal ideation  Patient reports no self-injurious behavior  Patient reports no violent behavior    Verbal deficits: None    Patient's response to intervention:  The patient's response to intervention is accepting, motivated.    Progress toward goals and other mental status changes:  The patient's progress toward goals is fair .    Diagnosis:     ICD-10-CM ICD-9-CM   1. Adjustment disorder with mixed anxiety and depressed mood  F43.23 309.28       Plan:  individual psychotherapy and medication management by physician    Return to clinic: 2 weeks    Length of Service (minutes): 60

## 2025-05-28 ENCOUNTER — PATIENT MESSAGE (OUTPATIENT)
Dept: PSYCHIATRY | Facility: CLINIC | Age: 65
End: 2025-05-28
Payer: MEDICARE

## 2025-05-28 ENCOUNTER — PATIENT MESSAGE (OUTPATIENT)
Dept: RESEARCH | Facility: CLINIC | Age: 65
End: 2025-05-28
Payer: MEDICARE

## 2025-05-30 DIAGNOSIS — R35.1 NOCTURIA: ICD-10-CM

## 2025-05-30 DIAGNOSIS — R39.15 URINARY URGENCY: ICD-10-CM

## 2025-05-30 DIAGNOSIS — N32.81 OVERACTIVE BLADDER: ICD-10-CM

## 2025-05-30 DIAGNOSIS — R35.0 URINARY FREQUENCY: ICD-10-CM

## 2025-05-30 DIAGNOSIS — N39.41 URGE URINARY INCONTINENCE: ICD-10-CM

## 2025-06-02 RX ORDER — MIRABEGRON 50 MG/1
1 TABLET, FILM COATED, EXTENDED RELEASE ORAL
Qty: 30 TABLET | Refills: 0 | Status: SHIPPED | OUTPATIENT
Start: 2025-06-02 | End: 2025-06-02

## 2025-06-11 ENCOUNTER — PATIENT MESSAGE (OUTPATIENT)
Dept: RESEARCH | Facility: CLINIC | Age: 65
End: 2025-06-11
Payer: MEDICARE

## 2025-06-18 ENCOUNTER — OFFICE VISIT (OUTPATIENT)
Dept: PSYCHIATRY | Facility: CLINIC | Age: 65
End: 2025-06-18
Payer: COMMERCIAL

## 2025-06-18 DIAGNOSIS — F43.20 ADJUSTMENT DISORDER IN REMISSION: Primary | ICD-10-CM

## 2025-06-18 PROCEDURE — 90834 PSYTX W PT 45 MINUTES: CPT | Mod: S$GLB,,,

## 2025-06-24 NOTE — PROGRESS NOTES
"Individual Psychotherapy (PhD/LCSW)    2025    Site:  [place]       Therapeutic Intervention: Met with patient.  Outpatient - Supportive psychotherapy 45 min - CPT Code 84022    Chief complaint/reason for encounter: depression and grief     Interval history and content of current session: Patient was last seen for a session approximately 1 month ago. She reports that she has been doing well overall since our last session. Her hollie maria elena qiu hosted several events recently, which she and her  attended. They had a nice time, and she plans to attend more upcoming events they have scheduled. Being part of their events committee has helped get her out of the house and around others more often, which she is actually enjoying despite her initial hesitation. Her daughter-in-law has moved out of their home and is living with her mother again. Her ultimate goal was for this to happen so that she could reclaim her space and gain some distance from her. She reports feeling relieved about this. Since their conflict (discussed in last session), she and her daughter-in-law had a vulnerable conversation with one another where her daughter-in-law acknowledged her behavior over the last several months. They have been able to get along with one another since this, and she even gave her a hug recently, which she identifies as a "big deal" for her. She shared that she recently had a dream and both of her  sons were in it. She describes it as a positive dream and states that in it, they were together and giving her permission to move forward with her life. She states that since having this dream, she no longer experiences the feelings of guilt that were a barrier to her taking steps forward in her life. We discussed my upcoming departure from the Outpatient Psychiatry Dept. Patient reports that she came to this session intending to discuss termination of therapy anyway given the progress she has made. We reviewed " that progress, noting her main goals of processing her grief, learning how to express herself, and no longer internalizing her thoughts/feelings, all of which she believes she accomplished. We discussed the ways in which therapy can be a continued resource for her in the future. I informed her of the plans for my departure, including the fact that she will receive notice in the mail along with referrals. She requested that today be our last session. She was informed of my last day and encouraged to reach out if something changes and she would like to meet again prior to my departure. She reported understanding and agreed.    Treatment plan:  Target symptoms: depression, adjustment, grief, interpersonal stress/conflict  Why chosen therapy is appropriate versus another modality: relevant to diagnosis, patient responds to this modality, evidence based practice  Outcome monitoring methods: self-report, observation  Therapeutic intervention type: supportive psychotherapy    Risk parameters:  Patient reports no suicidal ideation  Patient reports no homicidal ideation  Patient reports no self-injurious behavior  Patient reports no violent behavior    Verbal deficits: None    Patient's response to intervention:  The patient's response to intervention is accepting, motivated.    Progress toward goals and other mental status changes:  The patient's progress toward goals is good.    Diagnosis:     ICD-10-CM ICD-9-CM   1. Adjustment disorder in remission  F43.20 309.9       Plan:  individual psychotherapy and medication management by physician    Return to clinic: as needed    Length of Service (minutes): 45

## 2025-06-25 DIAGNOSIS — E78.5 HYPERLIPIDEMIA ASSOCIATED WITH TYPE 2 DIABETES MELLITUS: ICD-10-CM

## 2025-06-25 DIAGNOSIS — K21.9 GASTROESOPHAGEAL REFLUX DISEASE WITHOUT ESOPHAGITIS: ICD-10-CM

## 2025-06-25 DIAGNOSIS — E11.69 HYPERLIPIDEMIA ASSOCIATED WITH TYPE 2 DIABETES MELLITUS: ICD-10-CM

## 2025-06-25 RX ORDER — ROSUVASTATIN CALCIUM 10 MG/1
10 TABLET, COATED ORAL NIGHTLY
Qty: 90 TABLET | Refills: 3 | Status: SHIPPED | OUTPATIENT
Start: 2025-06-25

## 2025-06-25 RX ORDER — OMEPRAZOLE 40 MG/1
40 CAPSULE, DELAYED RELEASE ORAL EVERY MORNING
Qty: 90 CAPSULE | Refills: 1 | Status: SHIPPED | OUTPATIENT
Start: 2025-06-25

## 2025-06-25 NOTE — TELEPHONE ENCOUNTER
Refill Routing Note   Medication(s) are not appropriate for processing by [facility] for the following reason(s):        Required labs outdated    [facility] action(s):  Approve  Defer     Requires labs : Yes      Medication Therapy Plan: FOVS      Appointments  past 12m or future 3m with PCP    Date Provider   Last Visit   12/9/2024 Rayne Mcfadden MD   Next Visit   7/31/2025 Rayne Mcfadden MD   ED visits in past 90 days: 0        Note composed:9:46 AM 06/25/2025

## 2025-06-25 NOTE — TELEPHONE ENCOUNTER
Care Due:                  Date            Visit Type   Department     Provider  --------------------------------------------------------------------------------                                EP -                              PRIMARY      DESC FAMILY  Last Visit: 12-      CARE ([facility])   [facility]                              EP -                              PRIMARY      DESC FAMILY  Next Visit: 07-      [facility]                                                            Last  Test          Frequency    Reason                     Performed    Due Date  --------------------------------------------------------------------------------    HBA1C.......  6 months...  metFORMIN................  12-   06-    Lipid Panel.  12 months..  rosuvastatin.............  03-   03-    [facility] Embedded Care Due Messages. Reference number: [id number].   6/25/2025 7:07:07 AM CDT

## 2025-07-02 DIAGNOSIS — E11.9 TYPE 2 DIABETES MELLITUS WITHOUT COMPLICATION, WITHOUT LONG-TERM CURRENT USE OF INSULIN: ICD-10-CM

## 2025-07-02 RX ORDER — FOLIC ACID 1 MG/1
1 TABLET ORAL DAILY
COMMUNITY

## 2025-07-02 NOTE — TELEPHONE ENCOUNTER
No care due was identified.  [facility] Embedded Care Due Messages. Reference number: [id number].   7/02/2025 12:07:58 PM CDT

## 2025-07-22 DIAGNOSIS — E11.9 TYPE 2 DIABETES MELLITUS WITHOUT COMPLICATION, WITHOUT LONG-TERM CURRENT USE OF INSULIN: ICD-10-CM

## 2025-07-22 RX ORDER — METFORMIN HYDROCHLORIDE 500 MG/1
1000 TABLET, EXTENDED RELEASE ORAL
Qty: 180 TABLET | Refills: 0 | Status: SHIPPED | OUTPATIENT
Start: 2025-07-22

## 2025-07-22 NOTE — TELEPHONE ENCOUNTER
No care due was identified.  [facility] Embedded Care Due Messages. Reference number: [id number].   7/22/2025 7:07:36 AM CDT

## 2025-07-22 NOTE — TELEPHONE ENCOUNTER
Refill Routing Note   Medication(s) are not appropriate for processing by [facility] for the following reason(s):        Required labs outdated    [facility] action(s):  Defer             Appointments  past 12m or future 3m with PCP    Date Provider   Last Visit   12/9/2024 Rayne Mcfadden MD   Next Visit   7/31/2025 Rayne Mcfadden MD   ED visits in past 90 days: 0        Note composed:7:54 AM 07/22/2025

## 2025-07-25 ENCOUNTER — PATIENT OUTREACH (OUTPATIENT)
Dept: ADMINISTRATIVE | Facility: HOSPITAL | Age: 65
End: 2025-07-25
Payer: MEDICARE

## 2025-07-25 ENCOUNTER — OFFICE VISIT (OUTPATIENT)
Dept: URGENT CARE | Facility: CLINIC | Age: 65
End: 2025-07-25
Payer: MEDICARE

## 2025-07-25 VITALS
RESPIRATION RATE: 19 BRPM | HEIGHT: 65 IN | TEMPERATURE: 99 F | OXYGEN SATURATION: 95 % | DIASTOLIC BLOOD PRESSURE: 86 MMHG | SYSTOLIC BLOOD PRESSURE: 162 MMHG | HEART RATE: 81 BPM | WEIGHT: 279.31 LBS | BODY MASS INDEX: 46.53 KG/M2

## 2025-07-25 DIAGNOSIS — E11.9 TYPE 2 DIABETES MELLITUS WITHOUT COMPLICATION, WITHOUT LONG-TERM CURRENT USE OF INSULIN: Primary | ICD-10-CM

## 2025-07-25 DIAGNOSIS — R09.81 SINUS CONGESTION: ICD-10-CM

## 2025-07-25 DIAGNOSIS — J34.89 RHINORRHEA: ICD-10-CM

## 2025-07-25 DIAGNOSIS — J34.9 SINUS PROBLEM: ICD-10-CM

## 2025-07-25 DIAGNOSIS — U07.1 COVID: Primary | ICD-10-CM

## 2025-07-25 DIAGNOSIS — R05.9 COUGH, UNSPECIFIED TYPE: ICD-10-CM

## 2025-07-25 DIAGNOSIS — U07.1 COVID-19 VIRUS DETECTED: ICD-10-CM

## 2025-07-25 LAB
CTP QC/QA: YES
SARS-COV+SARS-COV-2 AG RESP QL IA.RAPID: POSITIVE

## 2025-07-25 RX ORDER — ALBUTEROL SULFATE 90 UG/1
2 INHALANT RESPIRATORY (INHALATION) EVERY 4 HOURS PRN
Qty: 18 G | Refills: 0 | Status: SHIPPED | OUTPATIENT
Start: 2025-07-25 | End: 2026-07-25

## 2025-07-25 RX ORDER — BENZONATATE 100 MG/1
100 CAPSULE ORAL 4 TIMES DAILY PRN
Qty: 40 CAPSULE | Refills: 0 | Status: SHIPPED | OUTPATIENT
Start: 2025-07-25 | End: 2025-08-04

## 2025-07-25 RX ORDER — PROMETHAZINE HYDROCHLORIDE AND DEXTROMETHORPHAN HYDROBROMIDE 6.25; 15 MG/5ML; MG/5ML
5 SYRUP ORAL EVERY 8 HOURS PRN
Qty: 118 ML | Refills: 0 | Status: SHIPPED | OUTPATIENT
Start: 2025-07-25 | End: 2025-08-04

## 2025-07-25 RX ORDER — MUPIROCIN 20 MG/G
OINTMENT TOPICAL 2 TIMES DAILY
Qty: 22 G | Refills: 0 | Status: SHIPPED | OUTPATIENT
Start: 2025-07-25 | End: 2025-08-01

## 2025-07-25 NOTE — PROGRESS NOTES
"Subjective:      Patient ID: Aminta Schreiber is a 65 y.o. female.    Vitals:  height is 5' 5" (1.651 m) and weight is 126.7 kg (279 lb 5.2 oz). Her oral temperature is 98.6 °F (37 °C). Her blood pressure is 162/86 (abnormal) and her pulse is 81. Her respiration is 19 and oxygen saturation is 95%.     Chief Complaint: Sinus Problem    Pt states she had an onset of sinus problems 2 days ago.  C/o sinus pressure, congestion, cough, eye pain, and headache.  Self administered Theraflu-- no relief.  PATIENT COMPLAINS OF SINUS CONGESTION FRONTAL HEADACHE AND BODY ACHES FOR THE LAST 2 DAYS.  ALSO MILD SORE THROAT.    Sinus Problem  This is a new problem. The current episode started in the past 7 days. The problem has been gradually worsening since onset. Her pain is at a severity of 7/10. The pain is moderate. Associated symptoms include chills, congestion, coughing, headaches and sinus pressure. Pertinent negatives include no diaphoresis or ear pain. Treatments tried: theraflu. The treatment provided no relief.       Constitution: Positive for activity change, appetite change, chills and fatigue. Negative for sweating and fever.   HENT:  Positive for congestion and sinus pressure. Negative for ear pain.    Respiratory:  Positive for cough.    Musculoskeletal:  Positive for muscle ache.   Skin:  Negative for erythema.   Neurological:  Positive for headaches.      Objective:     Physical Exam   Constitutional: She is oriented to person, place, and time. She appears well-developed. She is cooperative.  Non-toxic appearance. She does not appear ill. No distress.   HENT:   Head: Normocephalic and atraumatic.   Ears:   Right Ear: Hearing, tympanic membrane, external ear and ear canal normal.   Left Ear: Hearing, tympanic membrane, external ear and ear canal normal.   Nose: Nose normal. No mucosal edema, rhinorrhea or nasal deformity. No epistaxis. Right sinus exhibits no maxillary sinus tenderness and no frontal sinus tenderness. Left " sinus exhibits no maxillary sinus tenderness and no frontal sinus tenderness.   Mouth/Throat: Uvula is midline, oropharynx is clear and moist and mucous membranes are normal. No trismus in the jaw. Normal dentition. No uvula swelling. No oropharyngeal exudate, posterior oropharyngeal edema or posterior oropharyngeal erythema.   Eyes: Conjunctivae, EOM and lids are normal. Pupils are equal, round, and reactive to light. Right eye exhibits no discharge. Left eye exhibits no discharge. No scleral icterus.   Neck: Trachea normal and phonation normal. Neck supple. No JVD present. No tracheal deviation present. No thyromegaly present. No edema present. No erythema present. No neck rigidity present.   Cardiovascular: Normal rate, regular rhythm, normal heart sounds and normal pulses.   No murmur heard.Exam reveals no gallop and no friction rub.   Pulmonary/Chest: Effort normal and breath sounds normal. No stridor. No respiratory distress. She has no decreased breath sounds. She has no wheezes. She has no rhonchi. She has no rales. She exhibits no tenderness.   Abdominal: Normal appearance and bowel sounds are normal. She exhibits no distension and no mass. Soft. There is no abdominal tenderness. There is no rebound and no guarding.   Musculoskeletal: Normal range of motion.         General: No deformity. Normal range of motion.   Neurological: She is alert and oriented to person, place, and time. She has normal strength. She exhibits normal muscle tone. Coordination normal.   Skin: Skin is warm, dry, intact, not diaphoretic, not pale and no rash. Capillary refill takes less than 2 seconds. No erythema   Psychiatric: Her speech is normal and behavior is normal. Judgment and thought content normal.   Nursing note and vitals reviewed.    Results for orders placed or performed in visit on 07/25/25   SARS Coronavirus 2 Antigen, POCT Manual Read    Collection Time: 07/25/25  2:11 PM   Result Value Ref Range    SARS Coronavirus 2  Antigen Positive (A) Negative, Presumptive Negative     Acceptable Yes     No results found.     Assessment:     1. COVID    2. Sinus problem    3. Sinus congestion    4. Rhinorrhea    5. Cough, unspecified type        Plan:       COVID  -     nirmatrelvir-ritonavir 300 mg (150 mg x 2)-100 mg copackaged tablets (EUA); Take 3 tablets by mouth 2 (two) times daily for 5 days. Each dose contains 2 nirmatrelvir (pink tablets) and 1 ritonavir (white tablet). Take all 3 tablets together  Dispense: 30 tablet; Refill: 0    Sinus problem  -     SARS Coronavirus 2 Antigen, POCT Manual Read    Sinus congestion    Rhinorrhea  -     mupirocin (BACTROBAN) 2 % ointment; by Nasal route 2 (two) times daily. for 7 days  Dispense: 22 g; Refill: 0    Cough, unspecified type  -     albuterol (VENTOLIN HFA) 90 mcg/actuation inhaler; Inhale 2 puffs into the lungs every 4 (four) hours as needed for Wheezing or Shortness of Breath (Cough). Rescue  Dispense: 18 g; Refill: 0  -     promethazine-dextromethorphan (PROMETHAZINE-DM) 6.25-15 mg/5 mL Syrp; Take 5 mLs by mouth every 8 (eight) hours as needed (Cough).  Dispense: 118 mL; Refill: 0  -     benzonatate (TESSALON) 100 MG capsule; Take 1 capsule (100 mg total) by mouth 4 (four) times daily as needed for Cough.  Dispense: 40 capsule; Refill: 0      Follow up if symptoms worsen or fail to improve, for F/U with PCP or ED.   Patient Instructions   Instructions for Patients with Confirmed or Suspected COVID-19    If you are awaiting your test result, you will either be called or it will be released to the patient portal.  If you have any questions about your test, please visit [email or web address] or call our COVID-19 information line at [phone].      Please isolate yourself at home.  You may leave home and/or return to work once the following conditions are met:    If you have symptoms and tested positive:  More than 5 days since symptoms first appeared AND  More  than 24 hours fever free without medications AND       symptoms have improved   For five days after ending isolation, masks are required.    If you had no symptoms but tested positive:  More than 5 days since the date of the first positive test. If you develop symptoms, then use the guidelines above  For five days after ending isolation, masks are required.      Testing is not recommended if you are symptom free after completing isolation.

## 2025-07-25 NOTE — PROGRESS NOTES
Health Maintenance Topic(s) Outreach Outcomes & Actions Taken:    Lab(s) - Outreach Outcomes & Actions Taken  : Overdue Lab(s) Ordered

## 2025-07-25 NOTE — PATIENT INSTRUCTIONS
Instructions for Patients with Confirmed or Suspected COVID-19    If you are awaiting your test result, you will either be called or it will be released to the patient portal.  If you have any questions about your test, please visit [email or web address] or call our COVID-19 information line at [phone].      Please isolate yourself at home.  You may leave home and/or return to work once the following conditions are met:    If you have symptoms and tested positive:  More than 5 days since symptoms first appeared AND  More than 24 hours fever free without medications AND       symptoms have improved   For five days after ending isolation, masks are required.    If you had no symptoms but tested positive:  More than 5 days since the date of the first positive test. If you develop symptoms, then use the guidelines above  For five days after ending isolation, masks are required.      Testing is not recommended if you are symptom free after completing isolation.

## 2025-07-31 ENCOUNTER — OFFICE VISIT (OUTPATIENT)
Dept: FAMILY MEDICINE | Facility: CLINIC | Age: 65
End: 2025-07-31
Payer: MEDICARE

## 2025-07-31 VITALS
RESPIRATION RATE: 22 BRPM | HEIGHT: 65 IN | WEIGHT: 282.19 LBS | SYSTOLIC BLOOD PRESSURE: 128 MMHG | TEMPERATURE: 98 F | HEART RATE: 77 BPM | BODY MASS INDEX: 47.02 KG/M2 | DIASTOLIC BLOOD PRESSURE: 80 MMHG | OXYGEN SATURATION: 98 %

## 2025-07-31 DIAGNOSIS — U07.1 COVID: ICD-10-CM

## 2025-07-31 DIAGNOSIS — E11.69 HYPERLIPIDEMIA ASSOCIATED WITH TYPE 2 DIABETES MELLITUS: ICD-10-CM

## 2025-07-31 DIAGNOSIS — I15.2 HYPERTENSION ASSOCIATED WITH TYPE 2 DIABETES MELLITUS: ICD-10-CM

## 2025-07-31 DIAGNOSIS — E66.813 CLASS 3 SEVERE OBESITY WITH SERIOUS COMORBIDITY AND BODY MASS INDEX (BMI) OF 45.0 TO 49.9 IN ADULT, UNSPECIFIED OBESITY TYPE: ICD-10-CM

## 2025-07-31 DIAGNOSIS — F32.1 CURRENT MODERATE EPISODE OF MAJOR DEPRESSIVE DISORDER WITHOUT PRIOR EPISODE: ICD-10-CM

## 2025-07-31 DIAGNOSIS — E11.59 HYPERTENSION ASSOCIATED WITH TYPE 2 DIABETES MELLITUS: ICD-10-CM

## 2025-07-31 DIAGNOSIS — K21.9 GASTROESOPHAGEAL REFLUX DISEASE WITHOUT ESOPHAGITIS: ICD-10-CM

## 2025-07-31 DIAGNOSIS — D64.9 NORMOCYTIC ANEMIA: ICD-10-CM

## 2025-07-31 DIAGNOSIS — E11.9 TYPE 2 DIABETES MELLITUS WITHOUT COMPLICATION, WITHOUT LONG-TERM CURRENT USE OF INSULIN: ICD-10-CM

## 2025-07-31 DIAGNOSIS — Z12.31 BREAST CANCER SCREENING BY MAMMOGRAM: ICD-10-CM

## 2025-07-31 DIAGNOSIS — E78.5 HYPERLIPIDEMIA ASSOCIATED WITH TYPE 2 DIABETES MELLITUS: ICD-10-CM

## 2025-07-31 PROCEDURE — 3074F SYST BP LT 130 MM HG: CPT | Mod: CPTII,S$GLB,, | Performed by: STUDENT IN AN ORGANIZED HEALTH CARE EDUCATION/TRAINING PROGRAM

## 2025-07-31 PROCEDURE — 1126F AMNT PAIN NOTED NONE PRSNT: CPT | Mod: CPTII,S$GLB,, | Performed by: STUDENT IN AN ORGANIZED HEALTH CARE EDUCATION/TRAINING PROGRAM

## 2025-07-31 PROCEDURE — 99999 PR PBB SHADOW E&M-EST. PATIENT-LVL V: CPT | Mod: PBBFAC,,, | Performed by: STUDENT IN AN ORGANIZED HEALTH CARE EDUCATION/TRAINING PROGRAM

## 2025-07-31 PROCEDURE — 1160F RVW MEDS BY RX/DR IN RCRD: CPT | Mod: CPTII,S$GLB,, | Performed by: STUDENT IN AN ORGANIZED HEALTH CARE EDUCATION/TRAINING PROGRAM

## 2025-07-31 PROCEDURE — 3008F BODY MASS INDEX DOCD: CPT | Mod: CPTII,S$GLB,, | Performed by: STUDENT IN AN ORGANIZED HEALTH CARE EDUCATION/TRAINING PROGRAM

## 2025-07-31 PROCEDURE — 1159F MED LIST DOCD IN RCRD: CPT | Mod: CPTII,S$GLB,, | Performed by: STUDENT IN AN ORGANIZED HEALTH CARE EDUCATION/TRAINING PROGRAM

## 2025-07-31 PROCEDURE — 1101F PT FALLS ASSESS-DOCD LE1/YR: CPT | Mod: CPTII,S$GLB,, | Performed by: STUDENT IN AN ORGANIZED HEALTH CARE EDUCATION/TRAINING PROGRAM

## 2025-07-31 PROCEDURE — 3079F DIAST BP 80-89 MM HG: CPT | Mod: CPTII,S$GLB,, | Performed by: STUDENT IN AN ORGANIZED HEALTH CARE EDUCATION/TRAINING PROGRAM

## 2025-07-31 PROCEDURE — 3288F FALL RISK ASSESSMENT DOCD: CPT | Mod: CPTII,S$GLB,, | Performed by: STUDENT IN AN ORGANIZED HEALTH CARE EDUCATION/TRAINING PROGRAM

## 2025-07-31 PROCEDURE — 99215 OFFICE O/P EST HI 40 MIN: CPT | Mod: S$GLB,,, | Performed by: STUDENT IN AN ORGANIZED HEALTH CARE EDUCATION/TRAINING PROGRAM

## 2025-07-31 RX ORDER — ESTRADIOL 0.1 MG/G
0.5 CREAM VAGINAL
COMMUNITY
Start: 2025-07-05

## 2025-07-31 RX ORDER — BENZONATATE 100 MG/1
200 CAPSULE ORAL 3 TIMES DAILY PRN
Qty: 30 CAPSULE | Refills: 0 | Status: SHIPPED | OUTPATIENT
Start: 2025-07-31 | End: 2025-08-10

## 2025-07-31 NOTE — PROGRESS NOTES
[facility] Note    Chief Complaint      Chief Complaint   Patient presents with    Follow-up on chronic conditions      History of Present Illness      Aminta Schreiber is a 65 y.o. female with sHTN, T2DM, obesity, TORIBIO, chronic allergic rhinitis, urge incontinence and h/o nephrolithiasis who presents for f/u on chronic conditions in addition recently contracted mild covid infection , just with lingering scanty productive cough. She reports no SOB, wheezing , fever, CP  . She is compliant with all current medications.      Problem List Addressed This Visit:    1. Mild COVID infection     2. Chronic allergic rhinitis     3. Hypertension associated with type 2 diabetes mellitus  Overview:  - well controlled  - continue current medications  - counseling on taking medication at the same time BID    4. Type 2 diabetes mellitus without complication, without long-term current use of insulin    - well controlled on metformin, januvia  -declined GLP 1 agonist , benefits explained in regarding weight loss benefit as well  - continue current management plan   - patient encouraged to notify me with any changes    5. Hyperlipidemia associated with type 2 diabetes mellitus    - well controlled on crestor  - continue current medication    6. Normocytic Anemia    7. Class 3 severe obesity due to excess calories with serious comorbidity and body mass index (BMI) of 46  Overview:  - discussed recommendation for diet and cardiovascular exercise  - counseling on lifestyle modifications for risk factor reduction    8. Cigarette smoking  -readiness to quit 0 points  -refer tobacco program    9. Vitamin D deficiency  Overview:  - completed D2 50,000 units weekly  - currently on D3 1000 units daily  - 03/26/2021 vitamin-D level 24  - patient is near goal recommend increase her over-the-counter D3 to 2000 units daily       Health Maintenance   Topic Date Due    RSV Vaccine (Age 60+ and Pregnant patients) (1 - Risk 60-74 years  1-dose series) Never done    Diabetes Urine Screening  03/15/2025    Lipid Panel  03/15/2025    Hemoglobin A1c  06/09/2025    Diabetic Eye Exam  08/05/2025    Mammogram  09/27/2025    COVID-19 Vaccine (6 - 2024-25 season) 12/09/2025 (Originally 9/1/2024)    Pneumococcal Vaccines (Age 50+) (2 of 2 - PCV) 12/09/2025 (Originally 7/20/2021)    Colorectal Cancer Screening  07/06/2027 (Originally 1960)    Influenza Vaccine (1) 09/01/2025    LDCT Lung Screen  10/07/2025    High Dose Statin  07/31/2026    Foot Exam  07/31/2026    DEXA Scan  10/15/2029    TETANUS VACCINE  08/02/2033    Hepatitis C Screening  Completed    Shingles Vaccine  Completed       Past Medical History:   Diagnosis Date    Adrenal adenoma, left 12/14/2021    Atopic dermatitis 10/11/2022    Diabetes mellitus, type 2     GERD (gastroesophageal reflux disease)     Gross hematuria 04/30/2019    H/O left breast biopsy     Hyperlipidemia     Hypertension     Kidney stone     Migraines     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 Arevalo - 12/14/21 CT abd: Bilateral fat attenuation adrenal masses consistent with benign myelolipomas,, 4.1 cm on the right and 5.0 cm on the left.    Nephrolithiasis 07/24/2018    Personal history of colonic polyps 01/15/2014    Raynaud's disease     Renal calculus, right 11/23/2015    Renal colic on right side 11/23/2015    Right ureteral calculus 04/30/2019    Unspecified glaucoma     y-2    Urinary tract infection     Vaginal infection        Past Surgical History:   Procedure Laterality Date    BREAST CYST EXCISION      EXTRACORPOREAL SHOCK WAVE LITHOTRIPSY Right 08/09/2018    Procedure: LITHOTRIPSY, ESWL;  Surgeon: Milton Garland MD;  Location: [facility] OR;  Service: Urology;  Laterality: Right;    EXTRACORPOREAL SHOCK WAVE LITHOTRIPSY Right 05/02/2019    Procedure: LITHOTRIPSY, ESWL;  Surgeon: Milton Garland MD;  Location: [facility] OR;  Service: Urology;  Laterality: Right;     "EXTRACORPOREAL SHOCK WAVE LITHOTRIPSY Right 07/25/2019    Procedure: LITHOTRIPSY, ESWL;  Surgeon: Milton Garland MD;  Location: [facility] OR;  Service: Urology;  Laterality: Right;    HYSTERECTOMY      Partial    LIPOMA RESECTION Right 06/07/2022    Procedure: EXCISION, LIPOMA;  Surgeon: Odell Escamilla Jr., MD;  Location: [facility] OR;  Service: Orthopedics;  Laterality: Right;  include nerve exploration    LITHOTRIPSY      x 3 or 4 per pt       family history includes Bone cancer in her son; Breast cancer in her son; Cancer in her father and son; Diabetes in her mother; Early death in her paternal grandmother; Hypertension in her father, maternal aunt, mother, son, and son; Liver cancer in her son; Lung cancer in her son; Migraines in her daughter; No Known Problems in her brother and son; Throat cancer in her father.    Social History     Tobacco Use    Smoking status: Every Day     Current packs/day: 0.50     Average packs/day: 0.5 packs/day for 51.6 years (25.8 ttl pk-yrs)     Types: Cigarettes     Start date: 1974     Passive exposure: Current    Smokeless tobacco: Never    Tobacco comments:     Still smoking; refused smoking cessation program   Substance Use Topics    Alcohol use: Yes     Alcohol/week: 1.0 standard drink of alcohol     Types: 1 Standard drinks or equivalent per week     Comment: 'on occasion"    Drug use: Never       Review of Systems   Constitutional:  Negative for fatigue and fever.   Respiratory:  Positive for cough. Negative for chest tightness.    Gastrointestinal:  Negative for abdominal pain, diarrhea and vomiting.   Endocrine: Negative for polydipsia and polyphagia.   Genitourinary:  Negative for difficulty urinating, dysuria and frequency.   Musculoskeletal:  Positive for back pain. Negative for arthralgias, gait problem and joint swelling.   Skin:  Negative for rash.   Neurological:  Negative for seizures, weakness and headaches.   Psychiatric/Behavioral:  Negative for sleep disturbance.  "       Outpatient Encounter Medications as of 7/31/2025   Medication Sig Dispense Refill    albuterol (VENTOLIN HFA) 90 mcg/actuation inhaler Inhale 2 puffs into the lungs every 4 (four) hours as needed for Wheezing or Shortness of Breath (Cough). Rescue 18 g 0    ascorbic acid, vitamin C, (VITAMIN C) 1000 MG tablet Take 1,000 mg by mouth once daily.      benzonatate (TESSALON) 100 MG capsule Take 1 capsule (100 mg total) by mouth 4 (four) times daily as needed for Cough. 40 capsule 0    cholecalciferol, vitamin D3, (VITAMIN D3) 50 mcg (2,000 unit) Cap Take 1 capsule (2,000 Units total) by mouth once daily. 90 capsule 2    co-enzyme Q-10 30 mg capsule Take 30 mg by mouth once daily.      cyanocobalamin (VITAMIN B-12) 1000 MCG tablet Take 1 tablet (1,000 mcg total) by mouth once daily. 90 tablet 3    estradioL (ESTRACE) 0.01 % (0.1 mg/gram) vaginal cream Place 0.5 g vaginally twice a week.      ferrous sulfate (FEOSOL) 325 mg (65 mg iron) Tab tablet Take 1 tablet by mouth once daily.      folic acid (FOLVITE) 1 MG tablet Take 1 tablet by mouth once daily.      latanoprost 0.005 % ophthalmic solution       metFORMIN (GLUCOPHAGE-XR) 500 MG ER 24hr tablet Take 2 tablets by mouth once daily 180 tablet 0    mupirocin (BACTROBAN) 2 % ointment by Nasal route 2 (two) times daily. for 7 days 22 g 0    MYRBETRIQ 50 mg Tb24 Take 1 tablet (50 mg total) by mouth once daily. 90 tablet 3    omeprazole (PRILOSEC) 40 MG capsule Take 1 capsule by mouth in the morning 90 capsule 1    promethazine-dextromethorphan (PROMETHAZINE-DM) 6.25-15 mg/5 mL Syrp Take 5 mLs by mouth every 8 (eight) hours as needed (Cough). 118 mL 0    rosuvastatin (CRESTOR) 10 MG tablet TAKE 1 TABLET BY MOUTH ONCE DAILY IN THE EVENING 90 tablet 3    SITagliptin phosphate (JANUVIA) 25 MG Tab Take 1 tablet (25 mg total) by mouth once daily. 90 tablet 3    tamsulosin (FLOMAX) 0.4 mg Cap Take 1 capsule (0.4 mg total) by mouth once daily. 30 capsule 11    valACYclovir  "(VALTREX) 1000 MG tablet Take 1 tablet by mouth once daily.      losartan-hydrochlorothiazide 50-12.5 mg (HYZAAR) 50-12.5 mg per tablet Take 1 tablet by mouth 2 (two) times a day. 180 tablet 3    [] nirmatrelvir-ritonavir 300 mg (150 mg x 2)-100 mg copackaged tablets (EUA) Take 3 tablets by mouth 2 (two) times daily for 5 days. Each dose contains 2 nirmatrelvir (pink tablets) and 1 ritonavir (white tablet). Take all 3 tablets together 30 tablet 0    [DISCONTINUED] metFORMIN (GLUCOPHAGE-XR) 500 MG ER 24hr tablet Take 2 tablets (1,000 mg total) by mouth once daily. 180 tablet 3     No facility-administered encounter medications on file as of 2025.        Review of patient's allergies indicates:  No Known Allergies    Physical Exam      Vital Signs  Temp: 98.1 °F (36.7 °C)  Temp Source: Temporal  Pulse: 77  Resp: (!) 22  SpO2: 98 %  BP: 128/80  BP Location: Right arm  Patient Position: Sitting  Pain Score: 0-No pain  Height and Weight  Height: 5' 5" (165.1 cm)  Weight: 128 kg (282 lb 3 oz)  BSA (Calculated - sq m): 2.42 sq meters  BMI (Calculated): 47  Weight in (lb) to have BMI = 25: 149.9]    Physical Exam  Vitals reviewed.   Constitutional:       Appearance: Normal appearance. She is obese.   HENT:      Head: Normocephalic and atraumatic.      Right Ear: Tympanic membrane normal.      Left Ear: Tympanic membrane normal.      Mouth/Throat:      Mouth: Mucous membranes are moist.      Pharynx: Oropharynx is clear.   Eyes:      Extraocular Movements: Extraocular movements intact.      Conjunctiva/sclera: Conjunctivae normal.      Pupils: Pupils are equal, round, and reactive to light.   Cardiovascular:      Rate and Rhythm: Normal rate and regular rhythm.      Pulses: Normal pulses.      Heart sounds: Normal heart sounds.   Pulmonary:      Breath sounds: Normal breath sounds.   Abdominal:      General: Abdomen is flat. Bowel sounds are normal.      Palpations: Abdomen is soft.   Musculoskeletal:      " "Cervical back: Normal range of motion.      Right lower leg: No edema.      Left lower leg: No edema.   Skin:     General: Skin is warm and dry.   Neurological:      General: No focal deficit present.      Mental Status: She is alert and oriented to person, place, and time.      Sensory: No sensory deficit.   Psychiatric:         Mood and Affect: Mood normal.         Behavior: Behavior normal.          Laboratory:  CBC:  No results for input(s): "WBC", "RBC", "HGB", "HCT", "PLT", "MCV", "MCH", "MCHC" in the last 2160 hours.    CMP:  No results for input(s): "GLU", "CALCIUM", "ALBUMIN", "PROT", "NA", "K", "CO2", "CL", "BUN", "ALKPHOS", "ALT", "AST", "BILITOT" in the last 2160 hours.    Invalid input(s): "CREATININ"    URINALYSIS:  No results for input(s): "COLORU", "CLARITYU", "SPECGRAV", "PHUR", "PROTEINUA", "GLUCOSEU", "BILIRUBINCON", "BLOODU", "WBCU", "RBCU", "BACTERIA", "MUCUS", "NITRITE", "LEUKOCYTESUR", "UROBILINOGEN", "HYALINECASTS" in the last 2160 hours.   LIPIDS:  No results for input(s): "TSH", "HDL", "CHOL", "TRIG", "LDLCALC", "CHOLHDL", "NONHDLCHOL", "TOTALCHOLEST" in the last 2160 hours.    TSH:  No results for input(s): "TSH" in the last 2160 hours.  A1C:  No results for input(s): "HGBA1C" in the last 2160 hours.      Radiology:      Assessment/Plan     Aminta Schreiber is a 65 y.o.female with:    1. COVID mild  -c/w symptomatic regimen   -refill for tessalon perles sent     2. Chronic allergic rhinitis   -c/w flonase /zyrtec    3. Hypertension associated with type 2 diabetes mellitus  Overview:  - well controlled  - continue current medications    4. Type 2 diabetes mellitus without complication, without long-term current use of insulin    - well controlled on metformin, januvia  -declined GLP 1 agonist , benefits explained in regarding weight loss benefit as well  - continue current management plan   - patient encouraged to notify me with any changes    5. Hyperlipidemia associated with type 2 diabetes " mellitus    - well controlled on crestor  - continue current medication    6. Normocytic Anemia  -stable H&H    7. Class 3 severe obesity due to excess calories with serious comorbidity and body mass index (BMI) of 46.96  Overview:  -declined GLP 1 agonist due to fear of needles , benefits explained   - discussed recommendation for diet and cardiovascular exercise  - counseling on lifestyle modifications for risk factor reduction    8. Cigarette smoking  -readiness to quit 0 points  -refer tobacco program    9. Vitamin D deficiency  Overview:  - completed D2 50,000 units weekly  - currently on D3 1000 units daily  - 03/26/2021 vitamin-D level 24  - patient is near goal recommend increase her over-the-counter D3 to 2000 units daily      43 minutes of total time spent on the encounter, which includes face to face time and non-face to face time preparing to see the patient (eg, review of tests), Obtaining and/or reviewing separately obtained history, Documenting clinical information in the electronic or other health record, Independently interpreting results (not separately reported) and communicating results to the patient or Care coordination (not separately reported).      Rayne Mcfadden MD  Internal Medicine   [facility] [place]

## 2025-08-02 DIAGNOSIS — I15.2 HYPERTENSION ASSOCIATED WITH TYPE 2 DIABETES MELLITUS: ICD-10-CM

## 2025-08-02 DIAGNOSIS — E11.59 HYPERTENSION ASSOCIATED WITH TYPE 2 DIABETES MELLITUS: ICD-10-CM

## 2025-08-02 NOTE — TELEPHONE ENCOUNTER
No care due was identified.  [facility] Embedded Care Due Messages. Reference number: [id number].   8/02/2025 2:42:41 PM CDT

## 2025-08-04 RX ORDER — LOSARTAN POTASSIUM AND HYDROCHLOROTHIAZIDE 12.5; 5 MG/1; MG/1
1 TABLET ORAL 2 TIMES DAILY
Qty: 180 TABLET | Refills: 1 | Status: SHIPPED | OUTPATIENT
Start: 2025-08-04

## 2025-08-04 NOTE — TELEPHONE ENCOUNTER
Refill Decision Note   Aminta Delaneyey  is requesting a refill authorization.  Brief Assessment and Rationale for Refill:  Approve     Medication Therapy Plan:         Comments:     Note composed:10:57 AM 08/04/2025

## 2025-08-05 ENCOUNTER — OFFICE VISIT (OUTPATIENT)
Dept: UROLOGY | Facility: CLINIC | Age: 65
End: 2025-08-05
Payer: MEDICARE

## 2025-08-05 VITALS
OXYGEN SATURATION: 97 % | SYSTOLIC BLOOD PRESSURE: 126 MMHG | DIASTOLIC BLOOD PRESSURE: 76 MMHG | BODY MASS INDEX: 47.29 KG/M2 | WEIGHT: 284.19 LBS | HEART RATE: 80 BPM

## 2025-08-05 DIAGNOSIS — N39.3 SUI (STRESS URINARY INCONTINENCE, FEMALE): ICD-10-CM

## 2025-08-05 DIAGNOSIS — N32.81 OAB (OVERACTIVE BLADDER): ICD-10-CM

## 2025-08-05 DIAGNOSIS — N20.0 CALCIUM NEPHROLITHIASIS: Primary | ICD-10-CM

## 2025-08-05 DIAGNOSIS — R35.0 URINARY FREQUENCY: ICD-10-CM

## 2025-08-05 DIAGNOSIS — R39.15 URINARY URGENCY: ICD-10-CM

## 2025-08-05 DIAGNOSIS — E66.813 CLASS 3 SEVERE OBESITY DUE TO EXCESS CALORIES WITH SERIOUS COMORBIDITY AND BODY MASS INDEX (BMI) OF 45.0 TO 49.9 IN ADULT: ICD-10-CM

## 2025-08-05 DIAGNOSIS — N39.41 URGE URINARY INCONTINENCE: ICD-10-CM

## 2025-08-05 PROCEDURE — 3288F FALL RISK ASSESSMENT DOCD: CPT | Mod: CPTII,S$GLB,, | Performed by: UROLOGY

## 2025-08-05 PROCEDURE — 3074F SYST BP LT 130 MM HG: CPT | Mod: CPTII,S$GLB,, | Performed by: UROLOGY

## 2025-08-05 PROCEDURE — 99214 OFFICE O/P EST MOD 30 MIN: CPT | Mod: S$GLB,,, | Performed by: UROLOGY

## 2025-08-05 PROCEDURE — 99999 PR PBB SHADOW E&M-EST. PATIENT-LVL IV: CPT | Mod: PBBFAC,,, | Performed by: UROLOGY

## 2025-08-05 PROCEDURE — 3008F BODY MASS INDEX DOCD: CPT | Mod: CPTII,S$GLB,, | Performed by: UROLOGY

## 2025-08-05 PROCEDURE — 1159F MED LIST DOCD IN RCRD: CPT | Mod: CPTII,S$GLB,, | Performed by: UROLOGY

## 2025-08-05 PROCEDURE — 1160F RVW MEDS BY RX/DR IN RCRD: CPT | Mod: CPTII,S$GLB,, | Performed by: UROLOGY

## 2025-08-05 PROCEDURE — 1101F PT FALLS ASSESS-DOCD LE1/YR: CPT | Mod: CPTII,S$GLB,, | Performed by: UROLOGY

## 2025-08-05 PROCEDURE — 3078F DIAST BP <80 MM HG: CPT | Mod: CPTII,S$GLB,, | Performed by: UROLOGY

## 2025-08-05 PROCEDURE — 1126F AMNT PAIN NOTED NONE PRSNT: CPT | Mod: CPTII,S$GLB,, | Performed by: UROLOGY

## 2025-08-05 NOTE — PROGRESS NOTES
Subjective:      Patient ID: Aminta Schreiber is a 65 y.o. female.    Chief Complaint: Follow-up (Patient here today to do a basic checkup . )    Mrs Schreiber is about 11 days s/p Covid.  Pt with history of nephrolithiasis.  Patient did a month therapy with stone free medication and then went on a train trip.  No evidence of stone pain at this time.  Will monitor.  CT scan 1224 did not reveal kidney stones.  The patient is here follow up for overactive bladder.  She has been on mirabegron 50 mg daily.  This medication has become expensive based on insurance cost.  The patient will try to maintain medication.    Follow-up  This is a chronic (Overactive bladder) problem. The current episode started more than 1 year ago. The problem occurs constantly. The problem has been gradually improving. Pertinent negatives include no abdominal pain, anorexia, arthralgias, change in bowel habit, chest pain, chills, congestion, coughing, diaphoresis, fatigue, fever, headaches, joint swelling, myalgias, nausea, neck pain, numbness, rash, sore throat, swollen glands, urinary symptoms, vertigo, visual change, vomiting or weakness. Nothing aggravates the symptoms. Treatments tried: Mirbetriq. The treatment provided significant relief.     Review of Systems   Constitutional:  Negative for activity change, appetite change, chills, diaphoresis, fatigue and fever.   HENT:  Negative for congestion, ear pain, hearing loss, nosebleeds, sinus pressure, sore throat and trouble swallowing.    Eyes:  Negative for pain and visual disturbance.   Respiratory:  Negative for apnea, cough and shortness of breath.    Cardiovascular:  Negative for chest pain and leg swelling.   Gastrointestinal:  Negative for abdominal distention, abdominal pain, anal bleeding, anorexia, blood in stool, change in bowel habit, constipation, diarrhea, nausea, rectal pain and vomiting.   Endocrine: Negative for cold intolerance, heat intolerance, polydipsia, polyphagia and polyuria.    Genitourinary:  Negative for decreased urine volume, difficulty urinating, dyspareunia, dysuria, enuresis, flank pain, frequency, genital sores, hematuria, menstrual problem, pelvic pain, urgency, vaginal bleeding, vaginal discharge and vaginal pain.   Musculoskeletal:  Negative for arthralgias, back pain, joint swelling, myalgias and neck pain.   Skin:  Negative for color change, pallor and rash.   Allergic/Immunologic: Negative for environmental allergies, food allergies and immunocompromised state.   Neurological:  Negative for dizziness, vertigo, speech difficulty, weakness, numbness and headaches.   Hematological:  Negative for adenopathy. Does not bruise/bleed easily.   Psychiatric/Behavioral: Negative.        Objective:     Physical Exam  Vitals and nursing note reviewed.   Constitutional:       General: She is not in acute distress.     Appearance: Normal appearance. She is well-developed. She is obese. She is not ill-appearing, toxic-appearing or diaphoretic.   HENT:      Head: Normocephalic and atraumatic.      Right Ear: External ear normal.      Left Ear: External ear normal.      Nose: Nose normal.      Mouth/Throat:      Mouth: Mucous membranes are moist.      Pharynx: Oropharynx is clear. No oropharyngeal exudate or posterior oropharyngeal erythema.   Eyes:      General: No scleral icterus.        Right eye: No discharge.         Left eye: No discharge.      Conjunctiva/sclera: Conjunctivae normal.      Pupils: Pupils are equal, round, and reactive to light.   Cardiovascular:      Rate and Rhythm: Normal rate and regular rhythm.      Heart sounds: Normal heart sounds.   Pulmonary:      Effort: Pulmonary effort is normal.   Abdominal:      General: Bowel sounds are normal. There is no distension.      Palpations: Abdomen is soft. There is no mass.      Tenderness: There is no abdominal tenderness. There is no right CVA tenderness, left CVA tenderness, guarding or rebound.      Hernia: No hernia is  present.   Genitourinary:     General: Normal vulva.      Rectum: Normal.      Comments: Patient with no CVA tenderness, normal penis and scrotum.  Bladder nontender.  Musculoskeletal:         General: Normal range of motion.      Cervical back: Normal range of motion and neck supple.   Skin:     General: Skin is warm and dry.      Capillary Refill: Capillary refill takes 2 to 3 seconds.   Neurological:      Mental Status: She is alert and oriented to person, place, and time.      Deep Tendon Reflexes: Reflexes are normal and symmetric.   Psychiatric:         Mood and Affect: Mood normal.         Behavior: Behavior normal.         Thought Content: Thought content normal.         Judgment: Judgment normal.        Assessment:      1. Calcium nephrolithiasis    2. Class 3 severe obesity due to excess calories with serious comorbidity and body mass index (BMI) of 45.0 to 49.9 in adult    3. OAB (overactive bladder)    4. FINESSE (stress urinary incontinence, female)    5. Urge urinary incontinence    6. Urinary frequency    7. Urinary urgency      Plan:     Patient Instructions   Patient to continue Myrbetriq.  Will follow up in 6 months and as needed for renal colic if develops.

## 2025-08-05 NOTE — PATIENT INSTRUCTIONS
Patient to continue Myrbetriq.  Will follow up in 6 months and as needed for renal colic if develops.

## (undated) DEVICE — NDL HYPO REG 25G X 1 1/2

## (undated) DEVICE — ELECTRODE REM PLYHSV RETURN 9

## (undated) DEVICE — PACK BASIC

## (undated) DEVICE — SEE MEDLINE ITEM 157216

## (undated) DEVICE — BANDAGE ESMARK ELASTIC ST 4X9

## (undated) DEVICE — SUT ETHILON 5-0 PS-2 18IN

## (undated) DEVICE — TOURNIQUET SB QC SP 24X4IN

## (undated) DEVICE — SEE L#120831

## (undated) DEVICE — SEE MEDLINE ITEM 156955

## (undated) DEVICE — CLOSURE SKIN STERI STRIP 1/2X4

## (undated) DEVICE — APPLICATOR CHLORAPREP ORN 26ML

## (undated) DEVICE — COVER OVERHEAD SURG LT BLUE

## (undated) DEVICE — GAUZE SPONGE 4X4 12PLY

## (undated) DEVICE — GLOVE SURG BIOGEL LATEX SZ 7.5

## (undated) DEVICE — SUT VICRYL 3-0 27 SH

## (undated) DEVICE — ADHESIVE MASTISOL VIAL 48/BX

## (undated) DEVICE — BNDG COFLEX FOAM LF2 ST 4X5YD

## (undated) DEVICE — SEE MEDLINE ITEM 157116

## (undated) DEVICE — BLADE SCALP OPHTL BEVEL STR

## (undated) DEVICE — SEE MEDLINE ITEM 157131

## (undated) DEVICE — SEE MEDLINE ITEM 157173

## (undated) DEVICE — TOWEL OR DISP STRL BLUE 4/PK

## (undated) DEVICE — SLING ARM FASHION NAVY X-LG

## (undated) DEVICE — SEE MEDLINE ITEM 157117

## (undated) DEVICE — BANDAGE SOFFORM STER 2IN

## (undated) DEVICE — BANDAGE MATRIX HK LOOP 2IN 5YD

## (undated) DEVICE — MANIFOLD 4 PORT

## (undated) DEVICE — DRESSING XEROFORM FOIL PK 1X8

## (undated) DEVICE — PAD PREP 50/CA

## (undated) DEVICE — PAD CAST 2 IN X 4YDS STERILE